# Patient Record
Sex: FEMALE | Race: WHITE | Employment: OTHER | URBAN - METROPOLITAN AREA
[De-identification: names, ages, dates, MRNs, and addresses within clinical notes are randomized per-mention and may not be internally consistent; named-entity substitution may affect disease eponyms.]

---

## 2017-01-05 ENCOUNTER — HOSPITAL ENCOUNTER (OUTPATIENT)
Dept: MAMMOGRAPHY | Age: 77
Discharge: HOME | End: 2017-01-05
Payer: MEDICARE

## 2017-01-05 DIAGNOSIS — Z12.31 VISIT FOR SCREENING MAMMOGRAM: ICD-10-CM

## 2017-01-05 PROCEDURE — G0202 SCR MAMMO BI INCL CAD: HCPCS

## 2017-01-23 ENCOUNTER — TELEPHONE (OUTPATIENT)
Dept: INTERNAL MEDICINE CLINIC | Age: 77
End: 2017-01-23

## 2017-01-26 ENCOUNTER — HOSPITAL ENCOUNTER (OUTPATIENT)
Dept: LAB | Age: 77
Discharge: HOME | End: 2017-01-26
Payer: MEDICARE

## 2017-01-26 PROCEDURE — 36415 COLL VENOUS BLD VENIPUNCTURE: CPT

## 2017-01-26 PROCEDURE — 84443 ASSAY THYROID STIM HORMONE: CPT

## 2017-01-26 PROCEDURE — 81001 URINALYSIS AUTO W/SCOPE: CPT

## 2017-01-26 PROCEDURE — 85025 COMPLETE CBC W/AUTO DIFF WBC: CPT

## 2017-01-26 PROCEDURE — 80061 LIPID PANEL: CPT

## 2017-01-26 PROCEDURE — 80053 COMPREHEN METABOLIC PANEL: CPT

## 2017-03-01 ENCOUNTER — TELEPHONE (OUTPATIENT)
Dept: INTERNAL MEDICINE CLINIC | Age: 77
End: 2017-03-01

## 2017-03-01 NOTE — TELEPHONE ENCOUNTER
Nursing is no longer coming to the house so can you leave a lab order at the  and she will take him to lab demetrice first thing in the morning

## 2017-04-24 DIAGNOSIS — E78.2 MIXED HYPERLIPIDEMIA: ICD-10-CM

## 2017-04-24 DIAGNOSIS — I10 ESSENTIAL HYPERTENSION: ICD-10-CM

## 2017-04-24 RX ORDER — BISOPROLOL FUMARATE AND HYDROCHLOROTHIAZIDE 5; 6.25 MG/1; MG/1
1 TABLET ORAL DAILY
Qty: 90 TAB | Refills: 1 | Status: SHIPPED | COMMUNITY
Start: 2017-04-24 | End: 2018-01-11 | Stop reason: SDUPTHER

## 2017-04-24 RX ORDER — SIMVASTATIN 40 MG/1
40 TABLET, FILM COATED ORAL
Qty: 90 TAB | Refills: 1 | Status: SHIPPED | COMMUNITY
Start: 2017-04-24 | End: 2018-01-11 | Stop reason: SDUPTHER

## 2017-07-13 ENCOUNTER — OFFICE VISIT (OUTPATIENT)
Dept: INTERNAL MEDICINE CLINIC | Age: 77
End: 2017-07-13

## 2017-07-13 VITALS
WEIGHT: 171 LBS | DIASTOLIC BLOOD PRESSURE: 72 MMHG | RESPIRATION RATE: 12 BRPM | HEART RATE: 73 BPM | BODY MASS INDEX: 26.84 KG/M2 | TEMPERATURE: 97.6 F | SYSTOLIC BLOOD PRESSURE: 144 MMHG | OXYGEN SATURATION: 98 % | HEIGHT: 67 IN

## 2017-07-13 DIAGNOSIS — E55.9 VITAMIN D DEFICIENCY: ICD-10-CM

## 2017-07-13 DIAGNOSIS — E78.2 MIXED HYPERLIPIDEMIA: ICD-10-CM

## 2017-07-13 DIAGNOSIS — Z23 VACCINE FOR STREPTOCOCCUS PNEUMONIAE AND INFLUENZA: ICD-10-CM

## 2017-07-13 DIAGNOSIS — K57.32 DIVERTICULITIS OF COLON: ICD-10-CM

## 2017-07-13 DIAGNOSIS — I10 ESSENTIAL HYPERTENSION: Primary | ICD-10-CM

## 2017-07-13 NOTE — MR AVS SNAPSHOT
Visit Information Date & Time Provider Department Dept. Phone Encounter #  
 7/13/2017  1:15 PM Darius Somers MD [facility] Internal Medicine [phone] [id number] Follow-up Instructions Return for Wellness Visit. Upcoming Health Maintenance Date Due Pneumococcal 65+ Low/Medium Risk (2 of 2 - PPSV23) 5/18/2016 MEDICARE YEARLY EXAM 6/30/2017 INFLUENZA AGE 9 TO ADULT 8/1/2017 GLAUCOMA SCREENING Q2Y 9/15/2018 COLONOSCOPY 10/20/2021 DTaP/Tdap/Td series (2 - Td) 5/18/2025 Allergies as of 7/13/2017  Review Complete On: 7/13/2017 By: Darius Somers MD  
  
 Severity Noted Reaction Type Reactions Pcn [Penicillins]  06/18/2010   Side Effect Swelling Arm with injection Sonata [Zaleplon]  11/13/2015    Itching Current Immunizations  Reviewed on 7/13/2017 Name Date Influenza High Dose Vaccine PF 10/1/2016, 11/2/2015, 10/14/2013 Pneumococcal Conjugate (PCV-13) 5/18/2015 Pneumococcal Polysaccharide (PPSV-23)  Incomplete Tdap 5/18/2015 Reviewed by Darius Somers MD on 7/13/2017 at  1:39 PM  
You Were Diagnosed With   
  
 Codes Comments Essential hypertension    -  Primary ICD-10-CM: I10 
ICD-9-CM: 401.9 Mixed hyperlipidemia     ICD-10-CM: E78.2 ICD-9-CM: 272.2 Vitamin D deficiency     ICD-10-CM: E55.9 ICD-9-CM: 268.9 Diverticulitis of colon     ICD-10-CM: K57.32 
ICD-9-CM: 562.11 Vaccine for streptococcus pneumoniae and influenza     ICD-10-CM: Z23 ICD-9-CM: V06.6 Vitals BP Pulse Temp Resp Height(growth percentile) Weight(growth percentile) 144/72 73 97.6 °F (36.4 °C) (Oral) 12 5' 6.5\" (1.689 m) 171 lb (77.6 kg) SpO2 BMI OB Status Smoking Status 98% 27.19 kg/m2 Postmenopausal Never Smoker Vitals History BMI and BSA Data Body Mass Index Body Surface Area  
 27.19 kg/m 2 1.91 m 2 Preferred Pharmacy Pharmacy Name Phone [facility] - Krista Gutierrez [address] [place] [phone] Your Updated Medication List  
  
   
This list is accurate as of: 7/13/17  1:52 PM.  Always use your most recent med list.  
  
  
  
  
 aspirin delayed-release 81 mg tablet Take 81 mg by mouth daily. bisoprolol-hydroCHLOROthiazide 5-6.25 mg per tablet Commonly known as:  [facility] Take 1 Tab by mouth daily. BLEPHAMIDE 10-0.2 % ophthalmic suspension Generic drug:  sulfacetamide-prednisoLONE Administer 1 Drop to both eyes every three (3) hours. carisoprodol 250 mg tablet Commonly known as:  SOMA Take 1 Tab by mouth four (4) times daily. CENTRUM SILVER PO Take  by mouth. ciprofloxacin HCl 500 mg tablet Commonly known as:  CIPRO Take 1 Tab by mouth two (2) times a day. diclofenac EC 75 mg EC tablet Commonly known as:  VOLTAREN Take 1 Tab by mouth as needed. FIBER-CAPS (PSYLLIUM HUSK) PO Take  by mouth.  
  
 lansoprazole 30 mg capsule Commonly known as:  PREVACID Take  by mouth Daily (before breakfast). [facility] Blue-Sod Phos-PhSal-Hyo 118-10-40.8-36 mg Cap capsule Commonly known as:  Darin Gene Take 1 Cap by mouth nightly as needed. PATANOL 0.1 % ophthalmic solution Generic drug:  olopatadine Administer 2 Drops to both eyes two (2) times a day. simvastatin 40 mg tablet Commonly known as:  ZOCOR Take 1 Tab by mouth nightly. traMADol 50 mg tablet Commonly known as:  ULTRAM  
TAKE 1 TABLET BY MOUTH EVERY 6 HOURS AS NEEDED PAIN  
  
 VITAMIN C 500 mg tablet Generic drug:  ascorbic acid (vitamin C) Take 500 mg by mouth daily. VITAMIN D3 1,000 unit tablet Generic drug:  cholecalciferol Take 1,000 Units by mouth daily. zolpidem 5 mg tablet Commonly known as:  AMBIEN Take 0.5-1 Tabs by mouth nightly as needed for Sleep. Max Daily Amount: 5 mg. We Performed the Following LIPID PANEL [15131 CPT(R)] METABOLIC PANEL, COMPREHENSIVE [99553 CPT(R)] PNEUMOCOCCAL POLYSACCHARIDE VACCINE, 23-VALENT, ADULT OR IMMUNOSUPPRESSED PT DOSE, [13875 CPT(R)] [place] IMMUNIZ ADMIN,1 SINGLE/COMB VAC/TOXOID [id number] CPT(R)] Follow-up Instructions Return for Wellness Visit. Patient Instructions Vertigo: Exercises Your Care Instructions Here are some examples of typical rehabilitation exercises for your condition. Start each exercise slowly. Ease off the exercise if you start to have pain. Your doctor or physical therapist will tell you when you can start these exercises and which ones will work best for you. How to do the exercises Note: Do these exercises twice a day. Try to progress to doing each head movement 15 to 20 times. Then try to do them with your eyes closed. Exercise 1 1. Stand with a chair in front of you and a wall behind you. If you begin to fall, you may use them for support. 2. Stand with your feet together and your arms at your sides. 3. Move your head up and down 10 times. Exercise 2 Move your head side to side 10 times. Exercise 3 Move your head diagonally up and down 10 times. Exercise 4 Move your head diagonally up and down 10 times on the other side. Follow-up care is a key part of your treatment and safety. Be sure to make and go to all appointments, and call your doctor if you are having problems. It's also a good idea to know your test results and keep a list of the medicines you take. Where can you learn more? Go to http://rachna-glenny.info/. Enter F349 in the search box to learn more about \"Vertigo: Exercises. \" Current as of: July 29, 2016 Content Version: 11.3 © 3899-1839 [employer] Care instructions adapted under license by [employer] (which disclaims liability or warranty for this information).  If you have questions about a medical condition or this instruction, always ask your healthcare professional. [address] any warranty or liability for your use of this information. Benign Essential Tremor: Care Instructions Your Care Instructions Benign essential tremor is a medical term for shaking that you can't control. Your hand or fingers may shake when you lift a cup or point at something. Or your voice may shake when you speak. This type of tremor is not harmful. It is not caused by a stroke or Parkinson's disease. Some things can affect how much you shake. For example, drinking or eating something with caffeine may make tremors worse for a while. Some medicines also can increase tremors. These include antidepressants and too much thyroid replacement. Talk to your doctor if you think one of your medicines makes your tremors worse. If you are self-conscious about your tremors, there are some things you can do to reduce them or make them less noticeable. This includes taking medicine. Follow-up care is a key part of your treatment and safety. Be sure to make and go to all appointments, and call your doctor if you are having problems. It's also a good idea to know your test results and keep a list of the medicines you take. How can you care for yourself at home? · Take your medicines exactly as prescribed. Call your doctor if you think you are having a problem with your medicine. Some medicines that help control tremors have to be taken every day, even if you are not having tremors. You will get more details on the specific medicines your doctor prescribes. · Get plenty of rest. 
· Eat a balanced, healthy diet. · Try to reduce stress. Regular exercise and massages may help. · Limit alcohol. Heavy drinking can make your tremors worse. · Avoid drinks or foods with caffeine if they make your tremors worse. These include tea, cola, coffee, and chocolate. · Wear a heavy bracelet or watch. This adds a little weight to your hand. The extra weight may reduce tremors. · Drink from cups or glasses that are only half full. You may also want to try drinking with a straw. When should you call for help? Watch closely for changes in your health, and be sure to contact your doctor if: 
· You notice your tremors are getting worse. · You can't do your everyday activities because of your tremors. · You are sad and embarrassed about your shaking. Where can you learn more? Go to http://rachna-glenny.info/. Enter B746 in the search box to learn more about \"Benign Essential Tremor: Care Instructions. \" Current as of: October 14, 2016 Content Version: 11.3 © 5230-3602 [employer]. Care instructions adapted under license by [employer] (which disclaims liability or warranty for this information). If you have questions about a medical condition or this instruction, always ask your healthcare professional. [place] [address] any warranty or liability for your use of this information. Introducing [facility]! Celi Hopkins introduces Everbridge patient portal. Now you can access parts of your medical record, email your doctor's office, and request medication refills online. 1. In your internet browser, go to [email or web address] Toys 2. Click on the First Time User? Click Here link in the Sign In box. You will see the New Member Sign Up page. 3. Enter your Everbridge Access Code exactly as it appears below. You will not need to use this code after youve completed the sign-up process. If you do not sign up before the expiration date, you must request a new code. · Everbridge Access Code: [email or web address] Expires: 10/11/2017 11:37 AM 
 
4. Enter the last four digits of your Social Security Number (xxxx) and Date of Birth (mm/dd/yyyy) as indicated and click Submit.  You will be taken to the next sign-up page. 5. Create a BoostSuite ID. This will be your BoostSuite login ID and cannot be changed, so think of one that is secure and easy to remember. 6. Create a BoostSuite password. You can change your password at any time. 7. Enter your Password Reset Question and Answer. This can be used at a later time if you forget your password. 8. Enter your e-mail address. You will receive e-mail notification when new information is available in [address] 9. Click Sign Up. You can now view and download portions of your medical record. 10. Click the Download Summary menu link to download a portable copy of your medical information. If you have questions, please visit the Frequently Asked Questions section of the BoostSuite website. Remember, BoostSuite is NOT to be used for urgent needs. For medical emergencies, dial 911. Now available from your iPhone and Android! Please provide this summary of care documentation to your next provider. Your primary care clinician is listed as [email or web address] If you have any questions after today's visit, please call [phone].

## 2017-07-13 NOTE — PROGRESS NOTES
Chief Complaint   Patient presents with    Cholesterol Problem    Hypertension    Dizziness    Fatigue    Tremors     Patient denies any symptoms, reactions or allergies that would exclude them from being immunized today. Risks and adverse reactions were discussed and the VIS was given to them. All questions were addressed. · Pneumovax 23  · Left Arm  · 0.5 mL  · Lot# [id number]  · Exp: 09.19.2018  · [address]  · NDC - 3475-5037-12  · Pt tolerated well. The patient was observed for 15 min post injection. There were no reactions observed.

## 2017-07-13 NOTE — PATIENT INSTRUCTIONS
Vertigo: Exercises  Your Care Instructions  Here are some examples of typical rehabilitation exercises for your condition. Start each exercise slowly. Ease off the exercise if you start to have pain. Your doctor or physical therapist will tell you when you can start these exercises and which ones will work best for you. How to do the exercises  Note: Do these exercises twice a day. Try to progress to doing each head movement 15 to 20 times. Then try to do them with your eyes closed. Exercise 1    1. Stand with a chair in front of you and a wall behind you. If you begin to fall, you may use them for support. 2. Stand with your feet together and your arms at your sides. 3. Move your head up and down 10 times. Exercise 2    Move your head side to side 10 times. Exercise 3    Move your head diagonally up and down 10 times. Exercise 4    Move your head diagonally up and down 10 times on the other side. Follow-up care is a key part of your treatment and safety. Be sure to make and go to all appointments, and call your doctor if you are having problems. It's also a good idea to know your test results and keep a list of the medicines you take. Where can you learn more? Go to http://rachna-glenny.info/. Enter F349 in the search box to learn more about \"Vertigo: Exercises. \"  Current as of: July 29, 2016  Content Version: 11.3  © 3203-2644 [employer]. Care instructions adapted under license by [employer] (which disclaims liability or warranty for this information). If you have questions about a medical condition or this instruction, always ask your healthcare professional. [place] [address] any warranty or liability for your use of this information. Benign Essential Tremor: Care Instructions  Your Care Instructions  Benign essential tremor is a medical term for shaking that you can't control.  Your hand or fingers may shake when you lift a cup or point at something. Or your voice may shake when you speak. This type of tremor is not harmful. It is not caused by a stroke or Parkinson's disease. Some things can affect how much you shake. For example, drinking or eating something with caffeine may make tremors worse for a while. Some medicines also can increase tremors. These include antidepressants and too much thyroid replacement. Talk to your doctor if you think one of your medicines makes your tremors worse. If you are self-conscious about your tremors, there are some things you can do to reduce them or make them less noticeable. This includes taking medicine. Follow-up care is a key part of your treatment and safety. Be sure to make and go to all appointments, and call your doctor if you are having problems. It's also a good idea to know your test results and keep a list of the medicines you take. How can you care for yourself at home? · Take your medicines exactly as prescribed. Call your doctor if you think you are having a problem with your medicine. Some medicines that help control tremors have to be taken every day, even if you are not having tremors. You will get more details on the specific medicines your doctor prescribes. · Get plenty of rest.  · Eat a balanced, healthy diet. · Try to reduce stress. Regular exercise and massages may help. · Limit alcohol. Heavy drinking can make your tremors worse. · Avoid drinks or foods with caffeine if they make your tremors worse. These include tea, cola, coffee, and chocolate. · Wear a heavy bracelet or watch. This adds a little weight to your hand. The extra weight may reduce tremors. · Drink from cups or glasses that are only half full. You may also want to try drinking with a straw. When should you call for help? Watch closely for changes in your health, and be sure to contact your doctor if:  · You notice your tremors are getting worse.   · You can't do your everyday activities because of your tremors. · You are sad and embarrassed about your shaking. Where can you learn more? Go to http://rachna-glenny.info/. Enter B746 in the search box to learn more about \"Benign Essential Tremor: Care Instructions. \"  Current as of: October 14, 2016  Content Version: 11.3  © 5297-7187 [employer]. Care instructions adapted under license by [employer] (which disclaims liability or warranty for this information). If you have questions about a medical condition or this instruction, always ask your healthcare professional. [place] [address] any warranty or liability for your use of this information.

## 2017-07-14 NOTE — PROGRESS NOTES
HPI:  Rosalio Henry is a 76y.o. year old female who is here for a routine visit:    Has been doing about the same. Some ongoing arthritis pain that is stable. Some feeling of dizziness on turning her head while sitting. Some nausea but no vomiting. No fever or chills. No change in bowels or bladder. No fever or chills. Past Medical History:   Diagnosis Date    Arthritis     DDD BACK PAIN    Chest pain, atypical     Chronic pain     HIP    Diverticulitis of colon     Fibrocystic disease of breast     Hypercholesterolemia     Hypertension     IBS (irritable bowel syndrome)     Ovarian cyst        Past Surgical History:   Procedure Laterality Date    ENDOSCOPY, COLON, DIAGNOSTIC  10/22/2010    5 yr fu, Brand    HX APPENDECTOMY      HX COLONOSCOPY  10/20/2016    Dr. Cee Valentin - 5 yr f/u    HX DILATION AND CURETTAGE      HX TONSILLECTOMY         Prior to Admission medications    Medication Sig Start Date End Date Taking? Authorizing Provider   simvastatin (ZOCOR) 40 mg tablet Take 1 Tab by mouth nightly. 4/24/17  Yes Jennifer Barakat MD   bisoprolol-hydroCHLOROthiazide [facility]) 5-6.25 mg per tablet Take 1 Tab by mouth daily. 4/24/17  Yes Dionicio Peña III, MD   olopatadine (PATANOL) 0.1 % ophthalmic solution Administer 2 Drops to both eyes two (2) times a day. Yes Historical Provider   sulfacetamide-prednisoLONE (BLEPHAMIDE) 10-0.2 % ophthalmic suspension Administer 1 Drop to both eyes every three (3) hours. Yes Historical Provider   FIBER-CAPS, PSYLLIUM HUSK, PO Take  by mouth. Yes Historical Provider   diclofenac EC (VOLTAREN) 75 mg EC tablet Take 1 Tab by mouth as needed. 12/29/16  Yes Dionicio Peña III, MD   carisoprodol (SOMA) 250 mg tablet Take 1 Tab by mouth four (4) times daily.  12/29/16  Yes Dionicio Peña III, MD   traMADol (ULTRAM) 50 mg tablet TAKE 1 TABLET BY MOUTH EVERY 6 HOURS AS NEEDED PAIN 12/29/16  Yes Dionicio Peña III, MD   zolpidem (AMBIEN) 5 mg tablet Take 0.5-1 Tabs by mouth nightly as needed for Sleep. Max Daily Amount: 5 mg. 12/29/16  Yes Alejandro Tan MD   [facility] Blue-Sod Phos-PhSal-Hyo (URIBEL) 118-10-40.8-36 mg cap capsule Take 1 Cap by mouth nightly as needed. 6/29/16  Yes Iliana Cortes III, MD   lansoprazole (PREVACID) 30 mg capsule Take  by mouth Daily (before breakfast). Yes Historical Provider   ascorbic acid (VITAMIN C) 500 mg tablet Take 500 mg by mouth daily. Yes Historical Provider   cholecalciferol, vitamin d3, (VITAMIN D) 1,000 unit tablet Take 1,000 Units by mouth daily. Yes Historical Provider   MULTIVITAMINS W-MINERALS/LUT (CENTRUM SILVER PO) Take  by mouth. Yes Historical Provider   aspirin delayed-release 81 mg tablet Take 81 mg by mouth daily. Yes Historical Provider   ciprofloxacin HCl (CIPRO) 500 mg tablet Take 1 Tab by mouth two (2) times a day. 12/29/16   Alejandro Tan MD       Social History     Social History    Marital status:      Spouse name: N/A    Number of children: N/A    Years of education: N/A     Occupational History    Not on file.      Social History Main Topics    Smoking status: Never Smoker    Smokeless tobacco: Never Used    Alcohol use 1.0 - 2.0 oz/week     2 - 4 Standard drinks or equivalent per week    Drug use: No    Sexual activity: Not on file     Other Topics Concern    Not on file     Social History Narrative          ROS  Per HPI    Visit Vitals    /72    Pulse 73    Temp 97.6 °F (36.4 °C) (Oral)    Resp 12    Ht 5' 6.5\" (1.689 m)    Wt 171 lb (77.6 kg)    SpO2 98%    BMI 27.19 kg/m2         Physical Exam   Physical Examination: General appearance - alert, well appearing, and in no distress  Ears - bilateral TM's and external ear canals normal  Mouth - mucous membranes moist, pharynx normal without lesions  Neck - supple, no significant adenopathy  Lymphatics - no palpable lymphadenopathy, no hepatosplenomegaly  Chest - clear to auscultation, no wheezes, rales or rhonchi, symmetric air entry  Heart - normal rate, regular rhythm, normal S1, S2, no murmurs, rubs, clicks or gallops  Abdomen - soft, nontender, nondistended, no masses or organomegaly  Musculoskeletal - no joint tenderness, deformity or swelling  Extremities - peripheral pulses normal, no pedal edema, no clubbing or cyanosis      Assessment/Plan:  Alex Martin was seen today for cholesterol problem, hypertension, dizziness, fatigue and tremors. Diagnoses and all orders for this visit:    Essential hypertension - BP well controlled. Mixed hyperlipidemia - check labs for LDL goal of 100. Tolerating meds well. -     LIPID PANEL  -     METABOLIC PANEL, COMPREHENSIVE    Vitamin D deficiency - repleted. Diverticulitis of colon - stable and no recent flairs. Will use antibiotics if needed. Vaccine for streptococcus pneumoniae and influenza  -     PNEUMOCOCCAL POLYSACCHARIDE VACCINE, 23-VALENT, ADULT OR IMMUNOSUPPRESSED PT DOSE,  -     CT IMMUNIZ ADMIN,1 SINGLE/COMB VAC/TOXOID       Follow-up Disposition:  Return for Wellness Visit. Advised her to call back or return to office if symptoms worsen/change/persist.  Discussed expected course/resolution/complications of diagnosis in detail with patient. Medication risks/benefits/costs/interactions/alternatives discussed with patient. She was given an after visit summary which includes diagnoses, current medications, & vitals. She expressed understanding with the diagnosis and plan.

## 2017-07-31 ENCOUNTER — HOSPITAL ENCOUNTER (OUTPATIENT)
Dept: LAB | Age: 77
Discharge: HOME | End: 2017-07-31
Payer: MEDICARE

## 2017-07-31 PROCEDURE — 80061 LIPID PANEL: CPT

## 2017-07-31 PROCEDURE — 80053 COMPREHEN METABOLIC PANEL: CPT

## 2017-08-01 LAB
ALBUMIN SERPL-MCNC: 4.5 G/DL (ref 3.5–4.8)
ALBUMIN/GLOB SERPL: 2.1 {RATIO} (ref 1.2–2.2)
ALP SERPL-CCNC: 60 IU/L (ref 39–117)
ALT SERPL-CCNC: 17 IU/L (ref 0–32)
AST SERPL-CCNC: 14 IU/L (ref 0–40)
BILIRUB SERPL-MCNC: 0.5 MG/DL (ref 0–1.2)
BUN SERPL-MCNC: 14 MG/DL (ref 8–27)
BUN/CREAT SERPL: 21 (ref 12–28)
CALCIUM SERPL-MCNC: 9.9 MG/DL (ref 8.7–10.3)
CHLORIDE SERPL-SCNC: 101 MMOL/L (ref 96–106)
CHOLEST SERPL-MCNC: 154 MG/DL (ref 100–199)
CO2 SERPL-SCNC: 24 MMOL/L (ref 18–29)
CREAT SERPL-MCNC: 0.68 MG/DL (ref 0.57–1)
GLOBULIN SER CALC-MCNC: 2.1 G/DL (ref 1.5–4.5)
GLUCOSE SERPL-MCNC: 94 MG/DL (ref 65–99)
HDLC SERPL-MCNC: 56 MG/DL
LDLC SERPL CALC-MCNC: 78 MG/DL (ref 0–99)
POTASSIUM SERPL-SCNC: 4.4 MMOL/L (ref 3.5–5.2)
PROT SERPL-MCNC: 6.6 G/DL (ref 6–8.5)
SODIUM SERPL-SCNC: 143 MMOL/L (ref 134–144)
TRIGL SERPL-MCNC: 102 MG/DL (ref 0–149)
VLDLC SERPL CALC-MCNC: 20 MG/DL (ref 5–40)

## 2017-09-15 DIAGNOSIS — M54.9 BACK PAIN, UNSPECIFIED BACK LOCATION, UNSPECIFIED BACK PAIN LATERALITY, UNSPECIFIED CHRONICITY: ICD-10-CM

## 2017-09-15 RX ORDER — TRAMADOL HYDROCHLORIDE 50 MG/1
TABLET ORAL
Qty: 30 TAB | Refills: 1 | OUTPATIENT
Start: 2017-09-15 | End: 2018-05-16 | Stop reason: SDUPTHER

## 2017-09-18 NOTE — TELEPHONE ENCOUNTER
Orders Placed This Encounter    traMADol (ULTRAM) 50 mg tablet     Sig: TAKE 1 TABLET BY MOUTH EVERY 6 HOURS AS NEEDED PAIN     Dispense:  30 Tab     Refill:  1     This request is for a new prescription for a controlled substance as required by Federal/State law. .     The above controlled substance refill was called into patients pharmacy [facility] - authorized by Dr. Katherine Beck.

## 2017-11-10 RX ORDER — ZOLPIDEM TARTRATE 5 MG/1
TABLET ORAL
Qty: 30 TAB | Refills: 2 | OUTPATIENT
Start: 2017-11-10 | End: 2018-05-16 | Stop reason: SDUPTHER

## 2017-11-13 NOTE — TELEPHONE ENCOUNTER
Orders Placed This Encounter    zolpidem (AMBIEN) 5 mg tablet     Sig: TAKE 1/2 TO 1 TABLET BY MOUTH AT BEDTIME AS NEEDED SLEEP     Dispense:  30 Tab     Refill:  2     This request is for a new prescription for a controlled substance as required by Federal/State law. .     The above controlled substance refill was called into patients pharmacy - [facility] - authorized by Dr. Angeles Jackson.

## 2018-01-10 ENCOUNTER — HOSPITAL ENCOUNTER (OUTPATIENT)
Dept: MAMMOGRAPHY | Age: 78
Discharge: HOME | End: 2018-01-10
Payer: MEDICARE

## 2018-01-10 DIAGNOSIS — Z12.39 SCREENING FOR MALIGNANT NEOPLASM OF BREAST: ICD-10-CM

## 2018-01-10 PROCEDURE — 77067 SCR MAMMO BI INCL CAD: CPT

## 2018-01-11 ENCOUNTER — OFFICE VISIT (OUTPATIENT)
Dept: INTERNAL MEDICINE CLINIC | Age: 78
End: 2018-01-11

## 2018-01-11 VITALS
RESPIRATION RATE: 12 BRPM | DIASTOLIC BLOOD PRESSURE: 86 MMHG | BODY MASS INDEX: 27.32 KG/M2 | WEIGHT: 170 LBS | SYSTOLIC BLOOD PRESSURE: 142 MMHG | HEIGHT: 66 IN | HEART RATE: 74 BPM | TEMPERATURE: 97.8 F | OXYGEN SATURATION: 100 %

## 2018-01-11 DIAGNOSIS — K57.32 DIVERTICULITIS OF COLON: ICD-10-CM

## 2018-01-11 DIAGNOSIS — I10 ESSENTIAL HYPERTENSION: ICD-10-CM

## 2018-01-11 DIAGNOSIS — Z00.00 MEDICARE ANNUAL WELLNESS VISIT, SUBSEQUENT: Primary | ICD-10-CM

## 2018-01-11 DIAGNOSIS — Z23 NEED FOR SHINGLES VACCINE: ICD-10-CM

## 2018-01-11 DIAGNOSIS — E78.2 MIXED HYPERLIPIDEMIA: ICD-10-CM

## 2018-01-11 RX ORDER — CARISOPRODOL 250 MG/1
250 TABLET ORAL 4 TIMES DAILY
Qty: 180 TAB | Refills: 3 | Status: SHIPPED | OUTPATIENT
Start: 2018-01-11 | End: 2018-02-05 | Stop reason: ALTCHOICE

## 2018-01-11 RX ORDER — BISOPROLOL FUMARATE AND HYDROCHLOROTHIAZIDE 5; 6.25 MG/1; MG/1
1 TABLET ORAL DAILY
Qty: 90 TAB | Refills: 1 | Status: SHIPPED | OUTPATIENT
Start: 2018-01-11 | End: 2018-07-13 | Stop reason: SDUPTHER

## 2018-01-11 RX ORDER — SIMVASTATIN 40 MG/1
40 TABLET, FILM COATED ORAL
Qty: 90 TAB | Refills: 1 | Status: SHIPPED | OUTPATIENT
Start: 2018-01-11 | End: 2018-07-13 | Stop reason: SDUPTHER

## 2018-01-11 RX ORDER — CIPROFLOXACIN 500 MG/1
500 TABLET ORAL 2 TIMES DAILY
Qty: 20 TAB | Refills: 0 | Status: SHIPPED | OUTPATIENT
Start: 2018-01-11 | End: 2018-05-16 | Stop reason: SDUPTHER

## 2018-01-11 NOTE — PROGRESS NOTES
This is a Subsequent Medicare Annual Wellness Exam (AWV) (Performed 12 months after IPPE or effective date of Medicare Part B enrollment)  Well as for follow-up of her health problems. She has been generally doing well. She is stressed with issues at home with her . She feels no recent diverticulitis. Her arthritis continues to be a problem. She did have a recent hip injection that made very little difference. In the past she has had them done under x-ray guidance with good success. She is trying to limit the diclofenac prescription to about half the time. She denies any headaches or dizziness. She occasionally is lightheaded on standing. No chest pains or shortness of breath. No cough or wheeze. She has some occasional spasms in the [employer] helps that. I have reviewed the patient's medical history in detail and updated the computerized patient record. History     Past Medical History:   Diagnosis Date    Arthritis     DDD BACK PAIN    Chest pain, atypical     Chronic pain     HIP    Diverticulitis of colon     Fibrocystic disease of breast     Hypercholesterolemia     Hypertension     IBS (irritable bowel syndrome)     Menopause 1968    Ovarian cyst       Past Surgical History:   Procedure Laterality Date    ENDOSCOPY, COLON, DIAGNOSTIC  10/22/2010    5 yr fu, [facility]    HX APPENDECTOMY      HX COLONOSCOPY  10/20/2016    Dr. Shalini Estevez - 5 yr f/u    HX DILATION AND CURETTAGE      HX TONSILLECTOMY       Current Outpatient Prescriptions   Medication Sig Dispense Refill    zolpidem (AMBIEN) 5 mg tablet TAKE 1/2 TO 1 TABLET BY MOUTH AT BEDTIME AS NEEDED SLEEP 30 Tab 2    traMADol (ULTRAM) 50 mg tablet TAKE 1 TABLET BY MOUTH EVERY 6 HOURS AS NEEDED PAIN 30 Tab 1    simvastatin (ZOCOR) 40 mg tablet Take 1 Tab by mouth nightly. 90 Tab 1    bisoprolol-hydroCHLOROthiazide (ZIAC) 5-6.25 mg per tablet Take 1 Tab by mouth daily.  90 Tab 1    olopatadine (PATANOL) 0.1 % ophthalmic solution Administer 2 Drops to both eyes two (2) times a day.  sulfacetamide-prednisoLONE (BLEPHAMIDE) 10-0.2 % ophthalmic suspension Administer 1 Drop to both eyes every three (3) hours.  FIBER-CAPS, PSYLLIUM HUSK, PO Take  by mouth.  diclofenac EC (VOLTAREN) 75 mg EC tablet Take 1 Tab by mouth as needed. 180 Tab 2    carisoprodol (SOMA) 250 mg tablet Take 1 Tab by mouth four (4) times daily. 180 Tab 3    Mth-Me Blue-Sod Phos-PhSal-Hyo (URIBEL) 118-10-40.8-36 mg cap capsule Take 1 Cap by mouth nightly as needed. 90 Cap 2    lansoprazole (PREVACID) 30 mg capsule Take  by mouth Daily (before breakfast).  ascorbic acid (VITAMIN C) 500 mg tablet Take 500 mg by mouth daily.  cholecalciferol, vitamin d3, (VITAMIN D) 1,000 unit tablet Take 1,000 Units by mouth daily.  MULTIVITAMINS W-MINERALS/LUT (CENTRUM SILVER PO) Take  by mouth.  aspirin delayed-release 81 mg tablet Take 81 mg by mouth daily.  ciprofloxacin HCl (CIPRO) 500 mg tablet Take 1 Tab by mouth two (2) times a day.  20 Tab 0     Allergies   Allergen Reactions    Pcn [Penicillins] Swelling     Arm with injection    Sonata [Zaleplon] Itching     Family History   Problem Relation Age of Onset   Asael Kirkpatrick Arthritis-rheumatoid Mother     Heart Disease Father     Hypertension Sister     Cancer Maternal Uncle     Cancer Maternal Grandfather     Other Sister      IBS     Social History   Substance Use Topics    Smoking status: Never Smoker    Smokeless tobacco: Never Used    Alcohol use 1.0 - 2.0 oz/week     2 - 4 Standard drinks or equivalent per week     Patient Active Problem List   Diagnosis Code    Diverticulitis of colon K57.32    Back pain M54.9    Mixed hyperlipidemia E78.2    Essential hypertension I10    Vitamin D deficiency E55.9    Advance directive discussed with patient Z70.80    Cephalalgia R51   ROS - Per HPI  Physical Examination: General appearance - alert, well appearing, and in no distress  Eyes - pupils equal and reactive, extraocular eye movements intact  Ears - bilateral TM's and external ear canals normal  Nose - normal and patent, no erythema, discharge or polyps  Mouth - mucous membranes moist, pharynx normal without lesions  Neck - supple, no significant adenopathy  Lymphatics - no palpable lymphadenopathy, no hepatosplenomegaly  Chest - clear to auscultation, no wheezes, rales or rhonchi, symmetric air entry  Heart - normal rate, regular rhythm, normal S1, S2, no murmurs, rubs, clicks or gallops  Abdomen - soft, nontender, nondistended, no masses or organomegaly  Back exam - full range of motion, no tenderness, palpable spasm or pain on motion  Neurological - alert, oriented, normal speech, no focal findings or movement disorder noted  Musculoskeletal - no joint tenderness, deformity or swelling  Extremities - peripheral pulses normal, no pedal edema, no clubbing or cyanosis      Depression Risk Factor Screening:     PHQ over the last two weeks 7/13/2017   Little interest or pleasure in doing things Not at all   Feeling down, depressed or hopeless Not at all   Total Score PHQ 2 0     Alcohol Risk Factor Screening: You do not drink alcohol or very rarely. Functional Ability and Level of Safety:   Hearing Loss  Hearing is good. Activities of Daily Living  The home contains: no safety equipment. Patient does total self care    Fall Risk  Fall Risk Assessment, last 12 mths 7/13/2017   Able to walk? Yes   Fall in past 12 months?  No       Abuse Screen  Patient is not abused    Cognitive Screening   Evaluation of Cognitive Function:  Has your family/caregiver stated any concerns about your memory: no      Patient Care Team   Patient Care Team:  Mateo Navas MD as PCP - Hiren Smith MD (Gastroenterology)  Cuong Rudd MD (Ophthalmology)    Assessment/Plan   Education and counseling provided:  Are appropriate based on today's review and evaluation  End-of-Life planning (with patient's consent)  Diabetes screening test    Diagnoses and all orders for this visit:    1. Medicare annual wellness visit, subsequent    2. Need for shingles vaccine    3. Mixed hyperlipidemia -tolerating medications well. LDL goal of 100. Will continue current medicines and check levels to be sure goals are met. -     METABOLIC PANEL, COMPREHENSIVE  -     CBC WITH AUTOMATED DIFF  -     LIPID PANEL  -     TSH RFX ON ABNORMAL TO FREE T4  -     UA/M W/RFLX CULTURE, ROUTINE  -     simvastatin (ZOCOR) 40 mg tablet; Take 1 Tab by mouth nightly. -     carisoprodol (SOMA) 250 mg tablet; Take 1 Tab by mouth four (4) times daily. 4. Essential hypertension -blood pressure reasonably controlled. Continue current medicines for now. -     METABOLIC PANEL, COMPREHENSIVE  -     CBC WITH AUTOMATED DIFF  -     LIPID PANEL  -     TSH RFX ON ABNORMAL TO FREE T4  -     UA/M W/RFLX CULTURE, ROUTINE  -     bisoprolol-hydroCHLOROthiazide (ZIAC) 5-6.25 mg per tablet; Take 1 Tab by mouth daily. -     carisoprodol (SOMA) 250 mg tablet; Take 1 Tab by mouth four (4) times daily. 5. Diverticulitis of colon -will refill her antibiotics which she will use judiciously only for flares. -     ciprofloxacin HCl (CIPRO) 500 mg tablet; Take 1 Tab by mouth two (2) times a day. -     carisoprodol (SOMA) 250 mg tablet; Take 1 Tab by mouth four (4) times daily. Other orders  -     varicella-zoster recombinant, PF, (SHINGRIX, PF,) 50 mcg/0.5 mL susr injection; 0.5 mL by IntraMUSCular route once for 1 dose. 6.  Osteoarthritis and muscle painwe will continue to use diclofenac only on an as-needed basis. Also use Soma as needed. Consider a repeat hip injection if the pain persist.     Follow-up Disposition: 6 months   Advised her to call back or return to office if symptoms worsen/change/persist.  Discussed expected course/resolution/complications of diagnosis in detail with patient.     Medication risks/benefits/costs/interactions/alternatives discussed with patient. She was given an after visit summary which includes diagnoses, current medications, & vitals. She expressed understanding with the diagnosis and plan.          Health Maintenance Due   Topic Date Due    MEDICARE YEARLY EXAM  06/30/2017

## 2018-01-11 NOTE — PATIENT INSTRUCTIONS

## 2018-01-11 NOTE — MR AVS SNAPSHOT
Visit Information Date & Time Provider Department Dept. Phone Encounter #  
 1/11/2018  1:30 PM Bessie Aden MD [facility] Internal Medicine [phone] [id number] Upcoming Health Maintenance Date Due  
 MEDICARE YEARLY EXAM 6/30/2017 GLAUCOMA SCREENING Q2Y 9/15/2018 COLONOSCOPY 10/20/2021 DTaP/Tdap/Td series (2 - Td) 5/18/2025 Allergies as of 1/11/2018  Review Complete On: 1/11/2018 By: Bessie Aden MD  
  
 Severity Noted Reaction Type Reactions Pcn [Penicillins]  06/18/2010   Side Effect Swelling Arm with injection Sonata [Zaleplon]  11/13/2015    Itching Current Immunizations  Reviewed on 1/11/2018 Name Date Influenza High Dose Vaccine PF 10/1/2017, 10/1/2016, 11/2/2015, 10/14/2013 Pneumococcal Conjugate (PCV-13) 5/18/2015 Pneumococcal Polysaccharide (PPSV-23) 7/13/2017 Tdap 5/18/2015 Reviewed by Bessie Aden MD on 1/11/2018 at  1:46 PM  
 Reviewed by Bessie Aedn MD on 1/11/2018 at  1:52 PM  
You Were Diagnosed With   
  
 Codes Comments Medicare annual wellness visit, subsequent    -  Primary ICD-10-CM: Z00.00 ICD-9-CM: V70.0 Need for shingles vaccine     ICD-10-CM: U33 ICD-9-CM: V04.89 Mixed hyperlipidemia     ICD-10-CM: E78.2 ICD-9-CM: 272.2 Essential hypertension     ICD-10-CM: I10 
ICD-9-CM: 401.9 Diverticulitis of colon     ICD-10-CM: K57.32 
ICD-9-CM: 562.11 Vitals BP Pulse Temp Resp Height(growth percentile) Weight(growth percentile) 142/86 74 97.8 °F (36.6 °C) (Oral) 12 5' 6\" (1.676 m) 170 lb (77.1 kg) SpO2 BMI OB Status Smoking Status 100% 27.44 kg/m2 Postmenopausal Never Smoker Vitals History BMI and BSA Data Body Mass Index Body Surface Area  
 27.44 kg/m 2 1.89 m 2 Preferred Pharmacy Pharmacy Name Phone [facility] - [place] - [address] [phone] Your Updated Medication List  
  
   
 This list is accurate as of: 1/11/18  2:10 PM.  Always use your most recent med list.  
  
  
  
  
 aspirin delayed-release 81 mg tablet Take 81 mg by mouth daily. bisoprolol-hydroCHLOROthiazide 5-6.25 mg per tablet Commonly known as:  [facility] Take 1 Tab by mouth daily. BLEPHAMIDE 10-0.2 % ophthalmic suspension Generic drug:  sulfacetamide-prednisoLONE Administer 1 Drop to both eyes every three (3) hours. carisoprodol 250 mg tablet Commonly known as:  SOMA Take 1 Tab by mouth four (4) times daily. CENTRUM SILVER PO Take  by mouth. ciprofloxacin HCl 500 mg tablet Commonly known as:  CIPRO Take 1 Tab by mouth two (2) times a day. diclofenac EC 75 mg EC tablet Commonly known as:  VOLTAREN Take 1 Tab by mouth as needed. FIBER-CAPS (PSYLLIUM HUSK) PO Take  by mouth.  
  
 lansoprazole 30 mg capsule Commonly known as:  PREVACID Take  by mouth Daily (before breakfast). [facility]-Me Blue-Sod Phos-PhSal-Hyo 118-10-40.8-36 mg Cap capsule Commonly known as:  Anthony Horn Take 1 Cap by mouth nightly as needed. PATANOL 0.1 % ophthalmic solution Generic drug:  olopatadine Administer 2 Drops to both eyes two (2) times a day. simvastatin 40 mg tablet Commonly known as:  ZOCOR Take 1 Tab by mouth nightly. traMADol 50 mg tablet Commonly known as:  ULTRAM  
TAKE 1 TABLET BY MOUTH EVERY 6 HOURS AS NEEDED PAIN  
  
 varicella-zoster recombinant (PF) 50 mcg/0.5 mL Susr injection Commonly known as:  SHINGRIX (PF)  
0.5 mL by IntraMUSCular route once for 1 dose. VITAMIN C 500 mg tablet Generic drug:  ascorbic acid (vitamin C) Take 500 mg by mouth daily. VITAMIN D3 1,000 unit tablet Generic drug:  cholecalciferol Take 1,000 Units by mouth daily. zolpidem 5 mg tablet Commonly known as:  AMBIEN  
TAKE 1/2 TO 1 TABLET BY MOUTH AT BEDTIME AS NEEDED SLEEP Prescriptions Printed Refills varicella-zoster recombinant, PF, (SHINGRIX, PF,) 50 mcg/0.5 mL susr injection 1 Si.5 mL by IntraMUSCular route once for 1 dose. Class: Print Route: IntraMUSCular  
 ciprofloxacin HCl (CIPRO) 500 mg tablet 0 Sig: Take 1 Tab by mouth two (2) times a day. Class: Print Route: Oral  
 carisoprodol (SOMA) 250 mg tablet 3 Sig: Take 1 Tab by mouth four (4) times daily. Class: Print Route: Oral  
  
Prescriptions Sent to Pharmacy Refills  
 simvastatin (ZOCOR) 40 mg tablet 1 Sig: Take 1 Tab by mouth nightly. Class: Normal  
 Pharmacy: [facility] (Specialty) - [place] [place] [facility] Ph #: [phone] Route: Oral  
 bisoprolol-hydroCHLOROthiazide (ZIAC) 5-6.25 mg per tablet 1 Sig: Take 1 Tab by mouth daily. Class: Normal  
 Pharmacy: [facility] (Specialty) - [place] [address] [facility] Ph #: [phone] Route: Oral  
  
We Performed the Following CBC WITH AUTOMATED DIFF [06022 CPT(R)] LIPID PANEL [30393 CPT(R)] METABOLIC PANEL, COMPREHENSIVE [30384 CPT(R)] TSH RFX ON ABNORMAL TO FREE T4 [[id number] Custom] UA/M W/RFLX CULTURE, ROUTINE [[id number] Custom] Patient Instructions Medicare Wellness Visit, Female The best way to live healthy is to have a healthy lifestyle by eating a well-balanced diet, exercising regularly, limiting alcohol and stopping smoking. Regular physical exams and screening tests are another way to keep healthy. Preventive exams provided by your health care provider can find health problems before they become diseases or illnesses. Preventive services including immunizations, screening tests, monitoring and exams can help you take care of your own health. All people over age 72 should have a pneumovax  and and a prevnar shot to prevent pneumonia. These are once in a lifetime unless you and your provider decide differently. All people over 65 should have a yearly flu shot and a tetanus vaccine every 10 years. A bone mass density to screen for osteoporosis or thinning of the bones should be done every 2 years after 65. Screening for diabetes mellitus with a blood sugar test should be done every year. Glaucoma is a disease of the eye due to increased ocular pressure that can lead to blindness and it should be done every year by an eye professional. 
 
Cardiovascular screening tests that check for elevated lipids (fatty part of blood) which can lead to heart disease and strokes should be done every 5 years. Colorectal screening that evaluates for blood or polyps in your colon should be done yearly as a stool test or every five years as a flexible sigmoidoscope or every 10 years as a colonoscopy up to age 76. Breast cancer screening with a mammogram is recommended biennially  for women age 54-69. Screening for cervical cancer with a pap smear and pelvic exam is recommended for women after age 72 years every 2 years up to age 79 or when the provider and patient decide to stop. If there is a history of cervical abnormalities or other increased risk for cancer then the test is recommended yearly. Hepatitis C screening is also recommended for anyone born between [birth year] through [address]. A shingles vaccine is also recommended once in a lifetime after age 61. Your Medicare Wellness Exam is recommended annually. Here is a list of your current Health Maintenance items with a due date: 
Health Maintenance Due Topic Date Due  
 Annual Well Visit  06/30/2017 Introducing [facility]! Feliz Zaidi introduces [employer] patient portal. Now you can access parts of your medical record, email your doctor's office, and request medication refills online. 1. In your internet browser, go to [email or web address]. [employer] 2. Click on the First Time User? Click Here link in the Sign In box.  You will see the New Member Sign Up page. 3. Enter your [employer] Access Code exactly as it appears below. You will not need to use this code after youve completed the sign-up process. If you do not sign up before the expiration date, you must request a new code. · [employer] Access Code: [email or web address] Expires: 4/11/2018  1:22 PM 
 
4. Enter the last four digits of your Social Security Number (xxxx) and Date of Birth (mm/dd/yyyy) as indicated and click Submit. You will be taken to the next sign-up page. 5. Create a [employer] ID. This will be your [employer] login ID and cannot be changed, so think of one that is secure and easy to remember. 6. Create a [employer] password. You can change your password at any time. 7. Enter your Password Reset Question and Answer. This can be used at a later time if you forget your password. 8. Enter your e-mail address. You will receive e-mail notification when new information is available in [address] 9. Click Sign Up. You can now view and download portions of your medical record. 10. Click the Download Summary menu link to download a portable copy of your medical information. If you have questions, please visit the Frequently Asked Questions section of the [employer] website. Remember, [employer] is NOT to be used for urgent needs. For medical emergencies, dial 911. Now available from your iPhone and Android! Please provide this summary of care documentation to your next provider. Your primary care clinician is listed as [place] [phone] If you have any questions after today's visit, please call [phone].

## 2018-02-01 ENCOUNTER — HOSPITAL ENCOUNTER (OUTPATIENT)
Dept: LAB | Age: 78
Discharge: HOME | End: 2018-02-01
Payer: MEDICARE

## 2018-02-01 PROCEDURE — 84443 ASSAY THYROID STIM HORMONE: CPT

## 2018-02-01 PROCEDURE — 80061 LIPID PANEL: CPT

## 2018-02-01 PROCEDURE — 80053 COMPREHEN METABOLIC PANEL: CPT

## 2018-02-01 PROCEDURE — 81001 URINALYSIS AUTO W/SCOPE: CPT

## 2018-02-01 PROCEDURE — 85025 COMPLETE CBC W/AUTO DIFF WBC: CPT

## 2018-02-01 PROCEDURE — 36415 COLL VENOUS BLD VENIPUNCTURE: CPT

## 2018-02-02 ENCOUNTER — TELEPHONE (OUTPATIENT)
Dept: INTERNAL MEDICINE CLINIC | Age: 78
End: 2018-02-02

## 2018-02-02 LAB
ALBUMIN SERPL-MCNC: 4.5 G/DL (ref 3.5–4.8)
ALBUMIN/GLOB SERPL: 2.3 {RATIO} (ref 1.2–2.2)
ALP SERPL-CCNC: 60 IU/L (ref 39–117)
ALT SERPL-CCNC: 15 IU/L (ref 0–32)
AST SERPL-CCNC: 15 IU/L (ref 0–40)
BASOPHILS # BLD AUTO: 0 X10E3/UL (ref 0–0.2)
BASOPHILS NFR BLD AUTO: 0 %
BILIRUB SERPL-MCNC: 0.5 MG/DL (ref 0–1.2)
BUN SERPL-MCNC: 12 MG/DL (ref 8–27)
BUN/CREAT SERPL: 19 (ref 12–28)
CALCIUM SERPL-MCNC: 9.8 MG/DL (ref 8.7–10.3)
CHLORIDE SERPL-SCNC: 101 MMOL/L (ref 96–106)
CHOLEST SERPL-MCNC: 163 MG/DL (ref 100–199)
CO2 SERPL-SCNC: 24 MMOL/L (ref 18–29)
CREAT SERPL-MCNC: 0.64 MG/DL (ref 0.57–1)
EOSINOPHIL # BLD AUTO: 0.1 X10E3/UL (ref 0–0.4)
EOSINOPHIL NFR BLD AUTO: 1 %
ERYTHROCYTE [DISTWIDTH] IN BLOOD BY AUTOMATED COUNT: 13.3 % (ref 12.3–15.4)
GFR SERPLBLD CREATININE-BSD FMLA CKD-EPI: 85 ML/MIN/1.73
GFR SERPLBLD CREATININE-BSD FMLA CKD-EPI: 98 ML/MIN/1.73
GLOBULIN SER CALC-MCNC: 2 G/DL (ref 1.5–4.5)
GLUCOSE SERPL-MCNC: 93 MG/DL (ref 65–99)
GLUCOSE UR QL: NORMAL
HCT VFR BLD AUTO: 39.9 % (ref 34–46.6)
HDLC SERPL-MCNC: 53 MG/DL
HGB BLD-MCNC: 13 G/DL (ref 11.1–15.9)
IMM GRANULOCYTES # BLD: 0 X10E3/UL (ref 0–0.1)
IMM GRANULOCYTES NFR BLD: 0 %
KETONES UR QL STRIP: NORMAL
LDLC SERPL CALC-MCNC: 92 MG/DL (ref 0–99)
LYMPHOCYTES # BLD AUTO: 1.6 X10E3/UL (ref 0.7–3.1)
LYMPHOCYTES NFR BLD AUTO: 28 %
MCH RBC QN AUTO: 29.1 PG (ref 26.6–33)
MCHC RBC AUTO-ENTMCNC: 32.6 G/DL (ref 31.5–35.7)
MCV RBC AUTO: 90 FL (ref 79–97)
MONOCYTES # BLD AUTO: 0.4 X10E3/UL (ref 0.1–0.9)
MONOCYTES NFR BLD AUTO: 7 %
NEUTROPHILS # BLD AUTO: 3.6 X10E3/UL (ref 1.4–7)
NEUTROPHILS NFR BLD AUTO: 64 %
PH UR STRIP: NORMAL [PH]
PLATELET # BLD AUTO: 233 X10E3/UL (ref 150–379)
POTASSIUM SERPL-SCNC: 4.1 MMOL/L (ref 3.5–5.2)
PROT SERPL-MCNC: 6.5 G/DL (ref 6–8.5)
PROT UR QL STRIP: NORMAL
RBC # BLD AUTO: 4.46 X10E6/UL (ref 3.77–5.28)
SODIUM SERPL-SCNC: 142 MMOL/L (ref 134–144)
SP GR UR: NORMAL
TRIGL SERPL-MCNC: 90 MG/DL (ref 0–149)
TSH SERPL DL<=0.005 MIU/L-ACNC: 1.89 UIU/ML (ref 0.45–4.5)
VLDLC SERPL CALC-MCNC: 18 MG/DL (ref 5–40)
WBC # BLD AUTO: 5.7 X10E3/UL (ref 3.4–10.8)

## 2018-02-05 ENCOUNTER — TELEPHONE (OUTPATIENT)
Dept: INTERNAL MEDICINE CLINIC | Age: 78
End: 2018-02-05

## 2018-02-05 RX ORDER — TIZANIDINE 2 MG/1
2 TABLET ORAL
Qty: 30 TAB | Refills: 0 | Status: SHIPPED | OUTPATIENT
Start: 2018-02-05 | End: 2018-07-13 | Stop reason: ALTCHOICE

## 2018-02-05 NOTE — TELEPHONE ENCOUNTER
Spoke with pt and advised that her insurance has denied her Soma. They will cover Zanaflex. Per [facility], rx sent to pharm for her to try. If she does not like the new med, she will pay OOP for Soma. Orders Placed This Encounter    tiZANidine (ZANAFLEX) 2 mg tablet     Sig: Take 1 Tab by mouth three (3) times daily as needed. Dispense:  30 Tab     Refill:  0     The above medication refills were approved via verbal order by Dr. Onofre Alberto III.

## 2018-02-05 NOTE — TELEPHONE ENCOUNTER
----- Message from Francisca Herrera MD sent at 2/5/2018 12:26 PM EST -----  Regarding: FW: Carisoprodol  Tizanidine 2 mg TID PRN. #30 to try.   ----- Message -----     From: Ernestina Brenner     Sent: 2/5/2018  11:19 AM       To: Francisca Herrera MD  Subject: Carisoprodol                                     Patient's insurance will not approve Carisoprodol. However,formulary is Skelaxin or Tizanidine for chronic pain.

## 2018-05-16 ENCOUNTER — HOSPITAL ENCOUNTER (OUTPATIENT)
Dept: LAB | Age: 78
Discharge: HOME | End: 2018-05-16
Payer: MEDICARE

## 2018-05-16 ENCOUNTER — OFFICE VISIT (OUTPATIENT)
Dept: INTERNAL MEDICINE CLINIC | Age: 78
End: 2018-05-16

## 2018-05-16 VITALS
OXYGEN SATURATION: 98 % | BODY MASS INDEX: 26.52 KG/M2 | DIASTOLIC BLOOD PRESSURE: 78 MMHG | TEMPERATURE: 98 F | HEART RATE: 76 BPM | WEIGHT: 165 LBS | SYSTOLIC BLOOD PRESSURE: 158 MMHG | HEIGHT: 66 IN | RESPIRATION RATE: 12 BRPM

## 2018-05-16 DIAGNOSIS — K57.32 DIVERTICULITIS OF COLON: ICD-10-CM

## 2018-05-16 DIAGNOSIS — F51.01 PRIMARY INSOMNIA: ICD-10-CM

## 2018-05-16 DIAGNOSIS — R53.83 FATIGUE, UNSPECIFIED TYPE: ICD-10-CM

## 2018-05-16 DIAGNOSIS — N93.9 VAGINAL BLEEDING: Primary | ICD-10-CM

## 2018-05-16 DIAGNOSIS — M54.9 BACK PAIN, UNSPECIFIED BACK LOCATION, UNSPECIFIED BACK PAIN LATERALITY, UNSPECIFIED CHRONICITY: ICD-10-CM

## 2018-05-16 PROCEDURE — 80053 COMPREHEN METABOLIC PANEL: CPT

## 2018-05-16 PROCEDURE — 85025 COMPLETE CBC W/AUTO DIFF WBC: CPT

## 2018-05-16 PROCEDURE — 81001 URINALYSIS AUTO W/SCOPE: CPT

## 2018-05-16 PROCEDURE — 36415 COLL VENOUS BLD VENIPUNCTURE: CPT

## 2018-05-16 RX ORDER — CIPROFLOXACIN 500 MG/1
500 TABLET ORAL 2 TIMES DAILY
Qty: 20 TAB | Refills: 0 | Status: SHIPPED | OUTPATIENT
Start: 2018-05-16 | End: 2020-01-15 | Stop reason: ALTCHOICE

## 2018-05-16 RX ORDER — ZOLPIDEM TARTRATE 5 MG/1
TABLET ORAL
Qty: 30 TAB | Refills: 2 | Status: SHIPPED | OUTPATIENT
Start: 2018-05-16 | End: 2019-02-05 | Stop reason: SDUPTHER

## 2018-05-16 RX ORDER — TRAMADOL HYDROCHLORIDE 50 MG/1
TABLET ORAL
Qty: 30 TAB | Refills: 1 | Status: SHIPPED | OUTPATIENT
Start: 2018-05-16 | End: 2019-01-14 | Stop reason: SDUPTHER

## 2018-05-16 NOTE — PROGRESS NOTES
HPI:  Salbador Aragon is a [de-identified]y.o. year old female who is here for several issues. She has had ongoing pain in her back for several months now. She used to do well when we were able to give her Soma and tramadol and she use those at nighttime intermittently with reasonable success. She has tried that Zanaflex I previously offered with the tramadol and it has made little difference. No radicular pain down the legs. She is also noted a several week history of profound fatigue. She feels like she wants to sleep all the time. She is having issues with sleeping at nighttime despite taking Ambien. She continues to care for her  on a 24-hour day basis and that is been stressful to her. She is also had a several week history of some brown old blood vaginal discharge that seems to be happening intermittently. Some lower abdominal pressure as well. No hematuria. She has recently seen the urologist and had a cystoscopy for bladder issues and this is been negative. Past Medical History:   Diagnosis Date    Arthritis     DDD BACK PAIN    Chest pain, atypical     Chronic pain     HIP    Diverticulitis of colon     Fibrocystic disease of breast     Hypercholesterolemia     Hypertension     IBS (irritable bowel syndrome)     Menopause 1968    Ovarian cyst        Past Surgical History:   Procedure Laterality Date    ENDOSCOPY, COLON, DIAGNOSTIC  10/22/2010    5 yr fu, [facility]    HX APPENDECTOMY      HX COLONOSCOPY  10/20/2016    Dr. Cathleen Agustin - 5 yr f/u    HX DILATION AND CURETTAGE      HX TONSILLECTOMY         Prior to Admission medications    Medication Sig Start Date End Date Taking? Authorizing Provider   ciprofloxacin HCl (CIPRO) 500 mg tablet Take 1 Tab by mouth two (2) times a day.  5/16/18  Yes Andrew Desir III, MD   zolpidem (AMBIEN) 5 mg tablet TAKE 1/2 TO 1 TABLET BY MOUTH AT BEDTIME AS NEEDED SLEEP 5/16/18  Yes Andrew Desir III, MD   traMADol (ULTRAM) 50 mg tablet TAKE 1 TABLET BY MOUTH EVERY 6 HOURS AS NEEDED PAIN  Indications: Pain 5/16/18  Yes Tye Corado III, MD   simvastatin (ZOCOR) 40 mg tablet Take 1 Tab by mouth nightly. 1/11/18  Yes Rosalba Salmeron MD   bisoprolol-hydroCHLOROthiazide [facility]) 5-6.25 mg per tablet Take 1 Tab by mouth daily. 1/11/18  Yes Tye Corado III, MD   olopatadine (PATANOL) 0.1 % ophthalmic solution Administer 2 Drops to both eyes two (2) times a day. Yes Historical Provider   sulfacetamide-prednisoLONE (BLEPHAMIDE) 10-0.2 % ophthalmic suspension Administer 1 Drop to both eyes every three (3) hours. Yes Historical Provider   FIBER-CAPS, PSYLLIUM HUSK, PO Take  by mouth. Yes Historical Provider   diclofenac EC (VOLTAREN) 75 mg EC tablet Take 1 Tab by mouth as needed. 12/29/16  Yes Rosalba Salmeron MD   [facility] Blue-Sod Phos-PhSal-Hyo (URIBEL) 118-10-40.8-36 mg cap capsule Take 1 Cap by mouth nightly as needed. 6/29/16  Yes Tye Corado III, MD   lansoprazole (PREVACID) 30 mg capsule Take  by mouth Daily (before breakfast). Yes Historical Provider   ascorbic acid (VITAMIN C) 500 mg tablet Take 500 mg by mouth daily. Yes Historical Provider   cholecalciferol, vitamin d3, (VITAMIN D) 1,000 unit tablet Take 1,000 Units by mouth daily. Yes Historical Provider   MULTIVITAMINS W-MINERALS/LUT (CENTRUM SILVER PO) Take  by mouth. Yes Historical Provider   aspirin delayed-release 81 mg tablet Take 81 mg by mouth daily. Yes Historical Provider   tiZANidine (ZANAFLEX) 2 mg tablet Take 1 Tab by mouth three (3) times daily as needed. 2/5/18   Rosalba Salmeron MD       Social History     Social History    Marital status:      Spouse name: N/A    Number of children: N/A    Years of education: N/A     Occupational History    Not on file.      Social History Main Topics    Smoking status: Never Smoker    Smokeless tobacco: Never Used    Alcohol use 1.0 - 2.0 oz/week     2 - 4 Standard drinks or equivalent per week    Drug use: No    Sexual activity: No     Other Topics Concern    Not on file     Social History Narrative          ROS  Per HPI    Visit Vitals    /78    Pulse 76    Temp 98 °F (36.7 °C) (Oral)    Resp 12    Ht 5' 6\" (1.676 m)    Wt 165 lb (74.8 kg)    SpO2 98%    BMI 26.63 kg/m2         Physical Exam   Physical Examination: General appearance - alert, well appearing, and in no distress  Mouth - mucous membranes moist, pharynx normal without lesions  Neck - supple, no significant adenopathy  Lymphatics - no palpable lymphadenopathy, no hepatosplenomegaly  Chest - clear to auscultation, no wheezes, rales or rhonchi, symmetric air entry  Heart - normal rate, regular rhythm, normal S1, S2, no murmurs, rubs, clicks or gallops  Abdomen - tenderness noted in the lower abd midline. no rebound tenderness noted  bowel sounds normal  Back exam - limited range of motion, pain with motion noted during exam  Extremities - peripheral pulses normal, no pedal edema, no clubbing or cyanosis      Assessment/Plan:  Diagnoses and all orders for this visit:    1. Vaginal bleeding -persistent. Unclear etiology. Could be fibroids or other etiology. Will have her see GYN as soon as possible for evaluation. She will need a pelvic ultrasound.  -     REFERRAL TO OBSTETRICS AND GYNECOLOGY    2. Diverticulitis of colon -has been stable recently. She keeps a supply of Cipro on hand to use as needed. -     ciprofloxacin HCl (CIPRO) 500 mg tablet; Take 1 Tab by mouth two (2) times a day. 3. Back pain, unspecified back location, unspecified back pain laterality, unspecified chronicity -likely degenerative change in muscles. We discussed the use of Soma and she will defer that for now. -     traMADol (ULTRAM) 50 mg tablet; TAKE 1 TABLET BY MOUTH EVERY 6 HOURS AS NEEDED PAIN  Indications: Pain    4. Fatigue, unspecified type -? Related to blood loss or other etiology. Will obtain repeat lab work today.   She just had thyroid checked in February and it was fine. If these are normal would pursue further evaluation.  -     UA/M W/RFLX CULTURE, ROUTINE  -     CBC WITH AUTOMATED DIFF  -     METABOLIC PANEL, COMPREHENSIVE    5. Primary insomnia  -     zolpidem (AMBIEN) 5 mg tablet; TAKE 1/2 TO 1 TABLET BY MOUTH AT BEDTIME AS NEEDED SLEEP       Follow-up Disposition: after the above. Advised her to call back or return to office if symptoms worsen/change/persist.  Discussed expected course/resolution/complications of diagnosis in detail with patient. Medication risks/benefits/costs/interactions/alternatives discussed with patient. She was given an after visit summary which includes diagnoses, current medications, & vitals. She expressed understanding with the diagnosis and plan.

## 2018-05-16 NOTE — MR AVS SNAPSHOT
[address] [facility] [address] [place] [address] 
[phone] Patient: Robert Olivas MRN:  [id number] Visit Information Date & Time Provider Department Dept. Phone Encounter #  
 5/16/2018 11:15 AM Umair Villalta MD [facility] [phone] [id number] Your Appointments 7/11/2018  1:00 PM  
ROUTINE CARE with Abdoulaye Brumfield MD  
[facility] [address] [place] [address]) Appt Note: fuv; f/u  
 [address] [facility] [address] [facility] [address]  
[address] [address] [address] Upcoming Health Maintenance Date Due Influenza Age 5 to Adult 8/1/2018 GLAUCOMA SCREENING Q2Y 9/15/2018 MEDICARE YEARLY EXAM 1/12/2019 COLONOSCOPY 10/20/2021 DTaP/Tdap/Td series (2 - Td) 5/18/2025 Allergies as of 5/16/2018  Review Complete On: 5/16/2018 By: Ankit Dubose LPN Severity Noted Reaction Type Reactions Pcn [Penicillins]  06/18/2010   Side Effect Swelling Arm with injection Sonata [Zaleplon]  11/13/2015    Itching Current Immunizations  Reviewed on 1/11/2018 Name Date Influenza High Dose Vaccine PF 10/1/2017, 10/1/2016, 11/2/2015, 10/14/2013 Pneumococcal Conjugate (PCV-13) 5/18/2015 Pneumococcal Polysaccharide (PPSV-23) 7/13/2017 Tdap 5/18/2015 Not reviewed this visit You Were Diagnosed With   
  
 Codes Comments Vaginal bleeding    -  Primary ICD-10-CM: N93.9 ICD-9-CM: 623.8 Diverticulitis of colon     ICD-10-CM: K57.32 
ICD-9-CM: 562.11 Back pain, unspecified back location, unspecified back pain laterality, unspecified chronicity     ICD-10-CM: M54.9 ICD-9-CM: 724.5 Fatigue, unspecified type     ICD-10-CM: R53.83 ICD-9-CM: 780.79 Primary insomnia     ICD-10-CM: F51.01 
ICD-9-CM: 307.42 Vitals BP Pulse Temp Resp Height(growth percentile) Weight(growth percentile) 158/78 76 98 °F (36.7 °C) (Oral) 12 5' 6\" (1.676 m) 165 lb (74.8 kg) SpO2 BMI OB Status Smoking Status 98% 26.63 kg/m2 Postmenopausal Never Smoker Vitals History BMI and BSA Data Body Mass Index Body Surface Area  
 26.63 kg/m 2 1.87 m 2 Preferred Pharmacy Pharmacy Name Phone [facility]/PHARMACY #[id number] Smith, [address] [phone] Your Updated Medication List  
  
   
This list is accurate as of 5/16/18 11:54 AM.  Always use your most recent med list.  
  
  
  
  
 aspirin delayed-release 81 mg tablet Take 81 mg by mouth daily. bisoprolol-hydroCHLOROthiazide 5-6.25 mg per tablet Commonly known as:  [facility] Take 1 Tab by mouth daily. BLEPHAMIDE 10-0.2 % ophthalmic suspension Generic drug:  sulfacetamide-prednisoLONE Administer 1 Drop to both eyes every three (3) hours. CENTRUM SILVER PO Take  by mouth. ciprofloxacin HCl 500 mg tablet Commonly known as:  CIPRO Take 1 Tab by mouth two (2) times a day. diclofenac EC 75 mg EC tablet Commonly known as:  VOLTAREN Take 1 Tab by mouth as needed. FIBER-CAPS (PSYLLIUM HUSK) PO Take  by mouth.  
  
 lansoprazole 30 mg capsule Commonly known as:  PREVACID Take  by mouth Daily (before breakfast). [facility] Blue-Sod Phos-PhSal-Hyo 118-10-40.8-36 mg Cap capsule Commonly known as:  Andrews Baumgarten Take 1 Cap by mouth nightly as needed. PATANOL 0.1 % ophthalmic solution Generic drug:  olopatadine Administer 2 Drops to both eyes two (2) times a day. simvastatin 40 mg tablet Commonly known as:  ZOCOR Take 1 Tab by mouth nightly. tiZANidine 2 mg tablet Commonly known as:  Derinda Dole Take 1 Tab by mouth three (3) times daily as needed. traMADol 50 mg tablet Commonly known as:  ULTRAM  
TAKE 1 TABLET BY MOUTH EVERY 6 HOURS AS NEEDED PAIN  Indications: Pain VITAMIN C 500 mg tablet Generic drug:  ascorbic acid (vitamin C) Take 500 mg by mouth daily. VITAMIN D3 1,000 unit tablet Generic drug:  cholecalciferol Take 1,000 Units by mouth daily. zolpidem 5 mg tablet Commonly known as:  AMBIEN  
TAKE 1/2 TO 1 TABLET BY MOUTH AT BEDTIME AS NEEDED SLEEP Prescriptions Printed Refills  
 ciprofloxacin HCl (CIPRO) 500 mg tablet 0 Sig: Take 1 Tab by mouth two (2) times a day. Class: Print Route: Oral  
 zolpidem (AMBIEN) 5 mg tablet 2 Sig: TAKE 1/2 TO 1 TABLET BY MOUTH AT BEDTIME AS NEEDED SLEEP Class: Print  
 traMADol (ULTRAM) 50 mg tablet 1 Sig: TAKE 1 TABLET BY MOUTH EVERY 6 HOURS AS NEEDED PAIN  Indications: Pain Class: Print We Performed the Following CBC WITH AUTOMATED DIFF [55287 CPT(R)] METABOLIC PANEL, COMPREHENSIVE [54261 CPT(R)] REFERRAL TO OBSTETRICS AND GYNECOLOGY [REF51 Custom] UA/M W/RFLX CULTURE, ROUTINE [[id number] Custom] Referral Information Referral ID Referred By Referred To  
  
 [id number] [facility], [address] 40 [facility] Visits Status Start Date End Date 1 New Request 5/16/18 5/16/19 If your referral has a status of pending review or denied, additional information will be sent to support the outcome of this decision. Introducing \[facility] [place] [facility]\"" & HEALTH SERVICES! Mariposa Vasquez introduces Endorphin patient portal. Now you can access parts of your medical record, email your doctor's office, and request medication refills online. 1. In your internet browser, go to [email or web address] 2. Click on the First Time User? Click Here link in the Sign In box. You will see the New Member Sign Up page. 3. Enter your Endorphin Access Code exactly as it appears below. You will not need to use this code after youve completed the sign-up process. If you do not sign up before the expiration date, you must request a new code. · MeinProspekt Access Code: [email or web address] Expires: 8/14/2018 11:54 AM 
 
4. Enter the last four digits of your Social Security Number (xxxx) and Date of Birth (mm/dd/yyyy) as indicated and click Submit. You will be taken to the next sign-up page. 5. Create a MeinProspekt ID. This will be your MeinProspekt login ID and cannot be changed, so think of one that is secure and easy to remember. 6. Create a MeinProspekt password. You can change your password at any time. 7. Enter your Password Reset Question and Answer. This can be used at a later time if you forget your password. 8. Enter your e-mail address. You will receive e-mail notification when new information is available in [address] 9. Click Sign Up. You can now view and download portions of your medical record. 10. Click the Download Summary menu link to download a portable copy of your medical information. If you have questions, please visit the Frequently Asked Questions section of the MeinProspekt website. Remember, MeinProspekt is NOT to be used for urgent needs. For medical emergencies, dial 911. Now available from your iPhone and Android! Please provide this summary of care documentation to your next provider. Your primary care clinician is listed as Chase [phone] If you have any questions after today's visit, please call [phone].

## 2018-05-17 ENCOUNTER — TELEPHONE (OUTPATIENT)
Dept: INTERNAL MEDICINE CLINIC | Age: 78
End: 2018-05-17

## 2018-05-17 LAB
ALBUMIN SERPL-MCNC: 4.7 G/DL (ref 3.5–4.7)
ALBUMIN/GLOB SERPL: 2.4 {RATIO} (ref 1.2–2.2)
ALP SERPL-CCNC: 57 IU/L (ref 39–117)
ALT SERPL-CCNC: 21 IU/L (ref 0–32)
APPEARANCE UR: CLEAR
AST SERPL-CCNC: 17 IU/L (ref 0–40)
BACTERIA #/AREA URNS HPF: NORMAL /[HPF]
BASOPHILS # BLD AUTO: 0 X10E3/UL (ref 0–0.2)
BASOPHILS NFR BLD AUTO: 0 %
BILIRUB SERPL-MCNC: 0.3 MG/DL (ref 0–1.2)
BILIRUB UR QL STRIP: NEGATIVE
BUN SERPL-MCNC: 14 MG/DL (ref 8–27)
BUN/CREAT SERPL: 23 (ref 12–28)
CALCIUM SERPL-MCNC: 10 MG/DL (ref 8.7–10.3)
CASTS URNS QL MICRO: NORMAL /LPF
CHLORIDE SERPL-SCNC: 101 MMOL/L (ref 96–106)
CO2 SERPL-SCNC: 25 MMOL/L (ref 18–29)
COLOR UR: YELLOW
CREAT SERPL-MCNC: 0.61 MG/DL (ref 0.57–1)
EOSINOPHIL # BLD AUTO: 0.1 X10E3/UL (ref 0–0.4)
EOSINOPHIL NFR BLD AUTO: 1 %
EPI CELLS #/AREA URNS HPF: NORMAL /HPF
ERYTHROCYTE [DISTWIDTH] IN BLOOD BY AUTOMATED COUNT: 13.1 % (ref 12.3–15.4)
GFR SERPLBLD CREATININE-BSD FMLA CKD-EPI: 86 ML/MIN/1.73
GFR SERPLBLD CREATININE-BSD FMLA CKD-EPI: 99 ML/MIN/1.73
GLOBULIN SER CALC-MCNC: 2 G/DL (ref 1.5–4.5)
GLUCOSE SERPL-MCNC: 89 MG/DL (ref 65–99)
GLUCOSE UR QL: NEGATIVE
HCT VFR BLD AUTO: 41 % (ref 34–46.6)
HGB BLD-MCNC: 13.4 G/DL (ref 11.1–15.9)
HGB UR QL STRIP: NEGATIVE
IMM GRANULOCYTES # BLD: 0 X10E3/UL (ref 0–0.1)
IMM GRANULOCYTES NFR BLD: 0 %
KETONES UR QL STRIP: NEGATIVE
LEUKOCYTE ESTERASE UR QL STRIP: NEGATIVE
LYMPHOCYTES # BLD AUTO: 1.8 X10E3/UL (ref 0.7–3.1)
LYMPHOCYTES NFR BLD AUTO: 26 %
MCH RBC QN AUTO: 29.3 PG (ref 26.6–33)
MCHC RBC AUTO-ENTMCNC: 32.7 G/DL (ref 31.5–35.7)
MCV RBC AUTO: 90 FL (ref 79–97)
MICRO URNS: NORMAL
MICRO URNS: NORMAL
MONOCYTES # BLD AUTO: 0.4 X10E3/UL (ref 0.1–0.9)
MONOCYTES NFR BLD AUTO: 6 %
MUCOUS THREADS URNS QL MICRO: PRESENT
NEUTROPHILS # BLD AUTO: 4.6 X10E3/UL (ref 1.4–7)
NEUTROPHILS NFR BLD AUTO: 67 %
NITRITE UR QL STRIP: NEGATIVE
PH UR STRIP: 6.5 [PH] (ref 5–7.5)
PLATELET # BLD AUTO: 248 X10E3/UL (ref 150–379)
POTASSIUM SERPL-SCNC: 4.3 MMOL/L (ref 3.5–5.2)
PROT SERPL-MCNC: 6.7 G/DL (ref 6–8.5)
PROT UR QL STRIP: NEGATIVE
RBC # BLD AUTO: 4.57 X10E6/UL (ref 3.77–5.28)
RBC #/AREA URNS HPF: NORMAL /HPF
SODIUM SERPL-SCNC: 137 MMOL/L (ref 134–144)
SP GR UR: 1.01 (ref 1–1.03)
URINALYSIS REFLEX, 377202: NORMAL
UROBILINOGEN UR STRIP-MCNC: 0.2 MG/DL (ref 0.2–1)
WBC # BLD AUTO: 7 X10E3/UL (ref 3.4–10.8)
WBC #/AREA URNS HPF: NORMAL /HPF

## 2018-05-17 NOTE — TELEPHONE ENCOUNTER
Spoke with pt and advised that per SRJ, all labs are normal. Thinks her fatigue is from sleep deprivation from caring for her  who is in hospice. Called to make her an appt with her gyn, and they actually have walk in hours. Pt will walk in on day that she is available to address the vaginal bleeding.

## 2018-05-17 NOTE — TELEPHONE ENCOUNTER
----- Message from Kelly Ennis MD sent at 5/17/2018  9:14 AM EDT -----  Notify all OK. See GYN ASAP, no reason for her fatigue other than sleep deprivation.

## 2018-07-13 ENCOUNTER — OFFICE VISIT (OUTPATIENT)
Dept: INTERNAL MEDICINE CLINIC | Age: 78
End: 2018-07-13

## 2018-07-13 VITALS
SYSTOLIC BLOOD PRESSURE: 166 MMHG | BODY MASS INDEX: 26.74 KG/M2 | HEIGHT: 66 IN | DIASTOLIC BLOOD PRESSURE: 84 MMHG | WEIGHT: 166.4 LBS | OXYGEN SATURATION: 98 % | TEMPERATURE: 98.7 F | HEART RATE: 63 BPM | RESPIRATION RATE: 14 BRPM

## 2018-07-13 DIAGNOSIS — K57.32 DIVERTICULITIS OF COLON: ICD-10-CM

## 2018-07-13 DIAGNOSIS — I10 ESSENTIAL HYPERTENSION: ICD-10-CM

## 2018-07-13 DIAGNOSIS — M54.9 BACK PAIN, UNSPECIFIED BACK LOCATION, UNSPECIFIED BACK PAIN LATERALITY, UNSPECIFIED CHRONICITY: ICD-10-CM

## 2018-07-13 DIAGNOSIS — R42 DIZZINESS: ICD-10-CM

## 2018-07-13 DIAGNOSIS — E78.2 MIXED HYPERLIPIDEMIA: ICD-10-CM

## 2018-07-13 DIAGNOSIS — R51.9 HEADACHE, UNSPECIFIED HEADACHE TYPE: ICD-10-CM

## 2018-07-13 RX ORDER — BISOPROLOL FUMARATE AND HYDROCHLOROTHIAZIDE 5; 6.25 MG/1; MG/1
1 TABLET ORAL DAILY
Qty: 90 TAB | Refills: 4 | Status: SHIPPED | OUTPATIENT
Start: 2018-07-13 | End: 2019-06-24 | Stop reason: SDUPTHER

## 2018-07-13 RX ORDER — CARISOPRODOL 250 MG/1
250 TABLET ORAL
COMMUNITY
End: 2019-01-14 | Stop reason: SDUPTHER

## 2018-07-13 RX ORDER — SIMVASTATIN 40 MG/1
40 TABLET, FILM COATED ORAL
Qty: 90 TAB | Refills: 4 | Status: SHIPPED | OUTPATIENT
Start: 2018-07-13 | End: 2019-02-26 | Stop reason: ALTCHOICE

## 2018-07-13 RX ORDER — DICLOFENAC SODIUM 75 MG/1
75 TABLET, DELAYED RELEASE ORAL AS NEEDED
Qty: 180 TAB | Refills: 2 | Status: SHIPPED | OUTPATIENT
Start: 2018-07-13 | End: 2019-10-09 | Stop reason: SDUPTHER

## 2018-07-13 NOTE — MR AVS SNAPSHOT
[address] [place] [address] 
[phone] Patient: Christie Turcios MRN:  [id number] Visit Information Date & Time Provider Department Dept. Phone Encounter #  
 7/13/2018  2:15 PM Kayce Chávez MD [facility] Internal Medicine [phone] [id number] Follow-up Instructions Return in about 6 months (around 1/13/2019). Upcoming Health Maintenance Date Due Influenza Age 5 to Adult 8/1/2018 GLAUCOMA SCREENING Q2Y 9/15/2018 MEDICARE YEARLY EXAM 1/12/2019 COLONOSCOPY 10/20/2021 DTaP/Tdap/Td series (2 - Td) 5/18/2025 Allergies as of 7/13/2018  Review Complete On: 7/13/2018 By: Kayce Chávez MD  
  
 Severity Noted Reaction Type Reactions Pcn [Penicillins]  06/18/2010   Side Effect Swelling Arm with injection Sonata [Zaleplon]  11/13/2015    Itching Current Immunizations  Reviewed on 1/11/2018 Name Date Influenza High Dose Vaccine PF 10/1/2017, 10/1/2016, 11/2/2015, 10/14/2013 Pneumococcal Conjugate (PCV-13) 5/18/2015 Pneumococcal Polysaccharide (PPSV-23) 7/13/2017 Tdap 5/18/2015 Not reviewed this visit You Were Diagnosed With   
  
 Codes Comments Essential hypertension     ICD-10-CM: I10 
ICD-9-CM: 401.9 Mixed hyperlipidemia     ICD-10-CM: E78.2 ICD-9-CM: 272.2 Back pain, unspecified back location, unspecified back pain laterality, unspecified chronicity     ICD-10-CM: M54.9 ICD-9-CM: 724.5 Diverticulitis of colon     ICD-10-CM: K57.32 
ICD-9-CM: 562.11 Headache, unspecified headache type     ICD-10-CM: R51 ICD-9-CM: 784.0 Dizziness     ICD-10-CM: C79 ICD-9-CM: 780.4 Vitals BP Pulse Temp Resp Height(growth percentile) Weight(growth percentile) 166/84 63 98.7 °F (37.1 °C) (Oral) 14 5' 6\" (1.676 m) 166 lb 6.4 oz (75.5 kg) SpO2 BMI OB Status Smoking Status 98% 26.86 kg/m2 Postmenopausal Never Smoker Vitals History BMI and BSA Data Body Mass Index Body Surface Area  
 26.86 kg/m 2 1.88 m 2 Preferred Pharmacy [facility] Phone [facility] Krista Bernal - [address] [place] [address] Your Updated Medication List  
  
   
This list is accurate as of 7/13/18  2:42 PM.  Always use your most recent med list.  
  
  
  
  
 aspirin delayed-release 81 mg tablet Take 81 mg by mouth daily. bisoprolol-hydroCHLOROthiazide 5-6.25 mg per tablet Commonly known as:  [facility] Take 1 Tab by mouth daily. BLEPHAMIDE 10-0.2 % ophthalmic suspension Generic drug:  sulfacetamide-prednisoLONE Administer 1 Drop to both eyes every three (3) hours as needed. carisoprodol 250 mg tablet Commonly known as:  SOMA Take 250 mg by mouth four (4) times daily as needed for Muscle Spasm(s). CENTRUM SILVER PO Take  by mouth. ciprofloxacin HCl 500 mg tablet Commonly known as:  CIPRO Take 1 Tab by mouth two (2) times a day. diclofenac EC 75 mg EC tablet Commonly known as:  VOLTAREN Take 1 Tab by mouth as needed. lansoprazole 30 mg capsule Commonly known as:  PREVACID Take  by mouth Daily (before breakfast). [facility]-Me Blue-Sod Phos-PhSal-Hyo 118-10-40.8-36 mg Cap capsule Commonly known as:  Manpreet Abilio Take 1 Cap by mouth nightly as needed. PATANOL 0.1 % ophthalmic solution Generic drug:  olopatadine Administer 2 Drops to both eyes two (2) times daily as needed. PROBIOTIC 4X 10-15 mg Tbec Generic drug:  B.infantis-B.ani-B.long-B.bifi Take  by mouth. simvastatin 40 mg tablet Commonly known as:  ZOCOR Take 1 Tab by mouth nightly. traMADol 50 mg tablet Commonly known as:  ULTRAM  
TAKE 1 TABLET BY MOUTH EVERY 6 HOURS AS NEEDED PAIN  Indications: Pain  
  
 varicella-zoster recombinant (PF) 50 mcg/0.5 mL Susr injection Commonly known as:  SHINGRIX (PF)  
0.5 mL by IntraMUSCular route once for 1 dose. VITAMIN C 500 mg tablet Generic drug:  ascorbic acid (vitamin C) Take 500 mg by mouth daily. VITAMIN D3 1,000 unit tablet Generic drug:  cholecalciferol Take 1,000 Units by mouth daily. zolpidem 5 mg tablet Commonly known as:  AMBIEN  
TAKE 1/2 TO 1 TABLET BY MOUTH AT BEDTIME AS NEEDED SLEEP Prescriptions Printed Refills  
 varicella-zoster recombinant, PF, (SHINGRIX, PF,) 50 mcg/0.5 mL susr injection 1 Si.5 mL by IntraMUSCular route once for 1 dose. Class: Print Route: IntraMUSCular Prescriptions Sent to Pharmacy Refills  
 diclofenac EC (VOLTAREN) 75 mg EC tablet 2 Sig: Take 1 Tab by mouth as needed. Class: Normal  
 Pharmacy: [id number], [address] [place] [address] Ph #: [phone] Route: Oral  
 bisoprolol-hydroCHLOROthiazide (ZIAC) 5-6.25 mg per tablet 4 Sig: Take 1 Tab by mouth daily. Class: Normal  
 Pharmacy: [facility] ([facility] - [place], [address] [facility] Ph #: [phone] Route: Oral  
  
Follow-up Instructions Return in about 6 months (around 2019). Introducing [facility] [place] & HEALTH SERVICES! Adan Asencio introduces Familybuilder patient portal. Now you can access parts of your medical record, email your doctor's office, and request medication refills online. 1. In your internet browser, go to [email or web address] 2. Click on the First Time User? Click Here link in the Sign In box. You will see the New Member Sign Up page. 3. Enter your Familybuilder Access Code exactly as it appears below. You will not need to use this code after youve completed the sign-up process. If you do not sign up before the expiration date, you must request a new code. · [employer]builder Access Code: [email or web address] Expires: 2018 11:54 AM 
 
 4. Enter the last four digits of your Social Security Number (xxxx) and Date of Birth (mm/dd/yyyy) as indicated and click Submit. You will be taken to the next sign-up page. 5. Create a Clariture ID. This will be your Clariture login ID and cannot be changed, so think of one that is secure and easy to remember. 6. Create a Clariture password. You can change your password at any time. 7. Enter your Password Reset Question and Answer. This can be used at a later time if you forget your password. 8. Enter your e-mail address. You will receive e-mail notification when new information is available in [address] 9. Click Sign Up. You can now view and download portions of your medical record. 10. Click the Download Summary menu link to download a portable copy of your medical information. If you have questions, please visit the Frequently Asked Questions section of the Clariture website. Remember, Clariture is NOT to be used for urgent needs. For medical emergencies, dial 911. Now available from your iPhone and Android! Please provide this summary of care documentation to your next provider. Your primary care clinician is listed as Chase [phone] If you have any questions after today's visit, please call [phone].

## 2018-07-14 NOTE — PROGRESS NOTES
HPI:  Adele Sol is a [de-identified]y.o. year old female who is here for a routine visit:    Here for routine follow-up visit. Has been doing reasonably well. Continues to care for her  on a regular basis. Has not been checking her blood pressures. Does have some ongoing issues with hip pain and back pain that are relieved with Voltaren and Soma. Continues to have issues with her bladder. She does find that the medication helps. No recent diverticular flares. She is doing pelvic floor therapy and it is helping. Past Medical History:   Diagnosis Date    Arthritis     DDD BACK PAIN    Chest pain, atypical     Chronic pain     HIP    Diverticulitis of colon     Fibrocystic disease of breast     Hypercholesterolemia     Hypertension     IBS (irritable bowel syndrome)     Menopause 1968    Ovarian cyst        Past Surgical History:   Procedure Laterality Date    ENDOSCOPY, COLON, DIAGNOSTIC  10/22/2010    5 yr fu, Brand    HX APPENDECTOMY      HX COLONOSCOPY  10/20/2016    Dr. Land Bi - 5 yr f/u    HX DILATION AND CURETTAGE      HX TONSILLECTOMY         Prior to Admission medications    Medication Sig Start Date End Date Taking? Authorizing Provider   BSwethainfantis-B.ani-BSwethalong-B.bifi (PROBIOTIC 4X) 10-15 mg TbEC Take  by mouth. Yes Historical Provider   carisoprodol (SOMA) 250 mg tablet Take 250 mg by mouth four (4) times daily as needed for Muscle Spasm(s). Yes Historical Provider   varicella-zoster recombinant, PF, (SHINGRIX, PF,) 50 mcg/0.5 mL susr injection 0.5 mL by IntraMUSCular route once for 1 dose. 7/13/18 7/13/18 Yes Mirna Graham III, MD   diclofenac EC (VOLTAREN) 75 mg EC tablet Take 1 Tab by mouth as needed. 7/13/18  Yes Fernando Parker MD   bisoprolol-hydroCHLOROthiazide [facility]) 5-6.25 mg per tablet Take 1 Tab by mouth daily. 7/13/18  Yes Mirna Graham III, MD   simvastatin (ZOCOR) 40 mg tablet Take 1 Tab by mouth nightly.  7/13/18  Yes Fernando Parker MD   zolpidem (AMBIEN) 5 mg tablet TAKE 1/2 TO 1 TABLET BY MOUTH AT BEDTIME AS NEEDED SLEEP 5/16/18  Yes Majo Bassett III, MD   traMADol (ULTRAM) 50 mg tablet TAKE 1 TABLET BY MOUTH EVERY 6 HOURS AS NEEDED PAIN  Indications: Pain 5/16/18  Yes Majo Bassett III, MD   olopatadine (PATANOL) 0.1 % ophthalmic solution Administer 2 Drops to both eyes two (2) times daily as needed. Yes Historical Provider   sulfacetamide-prednisoLONE (BLEPHAMIDE) 10-0.2 % ophthalmic suspension Administer 1 Drop to both eyes every three (3) hours as needed. Yes Historical Provider   [facility] Blue-Sod Phos-PhSal-Hyo (URIBEL) 118-10-40.8-36 mg cap capsule Take 1 Cap by mouth nightly as needed. 6/29/16  Yes Majo Bassett III, MD   ascorbic acid (VITAMIN C) 500 mg tablet Take 500 mg by mouth daily. Yes Historical Provider   cholecalciferol, vitamin d3, (VITAMIN D) 1,000 unit tablet Take 1,000 Units by mouth daily. Yes Historical Provider   MULTIVITAMINS W-MINERALS/LUT (CENTRUM SILVER PO) Take  by mouth. Yes Historical Provider   aspirin delayed-release 81 mg tablet Take 81 mg by mouth daily. Yes Historical Provider   ciprofloxacin HCl (CIPRO) 500 mg tablet Take 1 Tab by mouth two (2) times a day. 5/16/18   Majo Bassett III, MD   lansoprazole (PREVACID) 30 mg capsule Take  by mouth Daily (before breakfast). Historical Provider       Social History     Social History    Marital status:      Spouse name: N/A    Number of children: N/A    Years of education: N/A     Occupational History    Not on file.      Social History Main Topics    Smoking status: Never Smoker    Smokeless tobacco: Never Used    Alcohol use 1.0 - 2.0 oz/week     2 - 4 Standard drinks or equivalent per week    Drug use: No    Sexual activity: No     Other Topics Concern    Not on file     Social History Narrative          ROS  Per HPI    Visit Vitals    /84    Pulse 63    Temp 98.7 °F (37.1 °C) (Oral)    Resp 14    Ht 5' 6\" (1.676 m)    Wt 166 lb 6.4 oz (75.5 kg)    SpO2 98%    BMI 26.86 kg/m2         Physical Exam   Physical Examination: General appearance - alert, well appearing, and in no distress  Mouth - mucous membranes moist, pharynx normal without lesions  Neck - supple, no significant adenopathy  Lymphatics - no palpable lymphadenopathy, no hepatosplenomegaly  Chest - clear to auscultation, no wheezes, rales or rhonchi, symmetric air entry  Heart - normal rate, regular rhythm, normal S1, S2, no murmurs, rubs, clicks or gallops  Abdomen - soft, nontender, nondistended, no masses or organomegaly  Musculoskeletal - no joint tenderness, deformity or swelling  Extremities - peripheral pulses normal, no pedal edema, no clubbing or cyanosis      Assessment/Plan:  Diagnoses and all orders for this visit:    1. Essential hypertension -blood pressure up today. She is going to check it at home on a regular basis and send me readings in 2 weeks. -     diclofenac EC (VOLTAREN) 75 mg EC tablet; Take 1 Tab by mouth as needed. -     bisoprolol-hydroCHLOROthiazide (ZIAC) 5-6.25 mg per tablet; Take 1 Tab by mouth daily. 2. Mixed hyperlipidemia -well-controlled on recent labs. Will continue current meds that are well-controlled and follow-up labs in 6 months.  -     diclofenac EC (VOLTAREN) 75 mg EC tablet; Take 1 Tab by mouth as needed. -     simvastatin (ZOCOR) 40 mg tablet; Take 1 Tab by mouth nightly. 3. Back pain, unspecified back location, unspecified back pain laterality, unspecified chronicity -stable on current meds. Will continue Soma and Voltaren as needed. -     diclofenac EC (VOLTAREN) 75 mg EC tablet; Take 1 Tab by mouth as needed. 4. Diverticulitis of colon -stable. -     diclofenac EC (VOLTAREN) 75 mg EC tablet; Take 1 Tab by mouth as needed. 5. Headache, unspecified headache type -stable. -     diclofenac EC (VOLTAREN) 75 mg EC tablet; Take 1 Tab by mouth as needed.     6. Dizziness  -     diclofenac EC (VOLTAREN) 75 mg EC tablet; Take 1 Tab by mouth as needed. Other orders  -     varicella-zoster recombinant, PF, (SHINGRIX, PF,) 50 mcg/0.5 mL susr injection; 0.5 mL by IntraMUSCular route once for 1 dose. Follow-up Disposition:  Return in about 6 months (around 1/13/2019). Advised her to call back or return to office if symptoms worsen/change/persist.  Discussed expected course/resolution/complications of diagnosis in detail with patient. Medication risks/benefits/costs/interactions/alternatives discussed with patient. She was given an after visit summary which includes diagnoses, current medications, & vitals. She expressed understanding with the diagnosis and plan.

## 2019-01-14 ENCOUNTER — OFFICE VISIT (OUTPATIENT)
Dept: INTERNAL MEDICINE CLINIC | Age: 79
End: 2019-01-14

## 2019-01-14 VITALS
HEIGHT: 66 IN | DIASTOLIC BLOOD PRESSURE: 79 MMHG | HEART RATE: 69 BPM | RESPIRATION RATE: 16 BRPM | OXYGEN SATURATION: 97 % | SYSTOLIC BLOOD PRESSURE: 139 MMHG | WEIGHT: 170 LBS | TEMPERATURE: 98 F | BODY MASS INDEX: 27.32 KG/M2

## 2019-01-14 DIAGNOSIS — Z00.00 MEDICARE ANNUAL WELLNESS VISIT, SUBSEQUENT: Primary | ICD-10-CM

## 2019-01-14 DIAGNOSIS — E78.2 MIXED HYPERLIPIDEMIA: ICD-10-CM

## 2019-01-14 DIAGNOSIS — I10 ESSENTIAL HYPERTENSION: ICD-10-CM

## 2019-01-14 DIAGNOSIS — E55.9 VITAMIN D DEFICIENCY: ICD-10-CM

## 2019-01-14 DIAGNOSIS — M54.9 BACK PAIN, UNSPECIFIED BACK LOCATION, UNSPECIFIED BACK PAIN LATERALITY, UNSPECIFIED CHRONICITY: ICD-10-CM

## 2019-01-14 RX ORDER — HYDROGEN PEROXIDE 3 %
SOLUTION, NON-ORAL MISCELLANEOUS DAILY
COMMUNITY
End: 2020-10-01 | Stop reason: ALTCHOICE

## 2019-01-14 RX ORDER — ESTRADIOL 10 UG/1
10 INSERT VAGINAL EVERY 2 WEEKS
COMMUNITY
End: 2020-01-15 | Stop reason: ALTCHOICE

## 2019-01-14 RX ORDER — TRAMADOL HYDROCHLORIDE 50 MG/1
TABLET ORAL
Qty: 30 TAB | Refills: 0 | Status: SHIPPED | OUTPATIENT
Start: 2019-01-14 | End: 2019-06-10 | Stop reason: SDUPTHER

## 2019-01-14 RX ORDER — CARISOPRODOL 250 MG/1
250 TABLET ORAL
Qty: 60 TAB | Refills: 0 | Status: SHIPPED | OUTPATIENT
Start: 2019-01-14 | End: 2019-07-14 | Stop reason: SDUPTHER

## 2019-01-14 NOTE — PATIENT INSTRUCTIONS
Medicare Wellness Visit, Female The best way to live healthy is to have a lifestyle where you eat a well-balanced diet, exercise regularly, limit alcohol use, and quit all forms of tobacco/nicotine, if applicable. Regular preventive services are another way to keep healthy. Preventive services (vaccines, screening tests, monitoring & exams) can help personalize your care plan, which helps you manage your own care. Screening tests can find health problems at the earliest stages, when they are easiest to treat. [facility] follows the current, evidence-based guidelines published by the [facility] ([facility]) when recommending preventive services for our patients. Because we follow these guidelines, sometimes recommendations change over time as research supports it. (For example, mammograms used to be recommended annually. Even though Medicare will still pay for an annual mammogram, the newer guidelines recommend a mammogram every two years for women of average risk.) Of course, you and your doctor may decide to screen more often for some diseases, based on your risk and your health status. Preventive services for you include: - Medicare offers their members a free annual wellness visit, which is time for you and your primary care provider to discuss and plan for your preventive service needs. Take advantage of this benefit every year! 
-All adults over the age of 72 should receive the recommended pneumonia vaccines. Current USPSTF guidelines recommend a series of two vaccines for the best pneumonia protection.  
-All adults should have a flu vaccine yearly and a tetanus vaccine every 10 years. All adults age 61 and older should receive a shingles vaccine once in their lifetime.   
-A bone mass density test is recommended when a woman turns 65 to screen for osteoporosis. This test is only recommended one time, as a screening. Some providers will use this same test as a disease monitoring tool if you already have osteoporosis. -All adults age 38-68 who are overweight should have a diabetes screening test once every three years.  
-Other screening tests and preventive services for persons with diabetes include: an eye exam to screen for diabetic retinopathy, a kidney function test, a foot exam, and stricter control over your cholesterol.  
-Cardiovascular screening for adults with routine risk involves an electrocardiogram (ECG) at intervals determined by your doctor.  
-Colorectal cancer screenings should be done for adults age 54-65 with no increased risk factors for colorectal cancer. There are a number of acceptable methods of screening for this type of cancer. Each test has its own benefits and drawbacks. Discuss with your doctor what is most appropriate for you during your annual wellness visit. The different tests include: colonoscopy (considered the best screening method), a fecal occult blood test, a fecal DNA test, and sigmoidoscopy. -Breast cancer screenings are recommended every other year for women of normal risk, age 54-69. 
-Cervical cancer screenings for women over age 72 are only recommended with certain risk factors.  
-All adults born between [facility] should be screened once for Hepatitis C. Here is a list of your current Health Maintenance items (your personalized list of preventive services) with a due date: 
Health Maintenance Due Topic Date Due  Shingles Vaccine (1 of 2) 05/12/1988  Flu Vaccine  08/01/2018  Glaucoma Screening   09/15/2018 [address] [place] [address] Annual Well Visit  01/12/2019

## 2019-01-14 NOTE — PROGRESS NOTES
This is the Subsequent Medicare Annual Wellness Exam, performed 12 months or more after the Initial AWV or the last Subsequent AWV I have reviewed the patient's medical history in detail and updated the computerized patient record. As well as a follow-up of her health issues. She continues to have issues with arthritis. She is taking her muscle relaxers and pain medications as needed but rarely. Some ongoing issues with knee pain. No recent diverticulitis. Her bladder gives her some difficulty but she does not wish to go back to urology to evaluate for that. Pressures been under good control. History Past Medical History:  
Diagnosis Date  Arthritis DDD BACK PAIN  
 Chest pain, atypical   
 Chronic pain HIP  Diverticulitis of colon  Fibrocystic disease of breast   
 Hypercholesterolemia  Hypertension  IBS (irritable bowel syndrome) [facility]  Ovarian cyst   
  
Past Surgical History:  
Procedure Laterality Date  ENDOSCOPY, COLON, DIAGNOSTIC  10/22/2010  
 5 yr fu, Brand  HX APPENDECTOMY  HX COLONOSCOPY  10/20/2016 Dr. Clay Gonzalez - 5 yr f/u  
 HX DILATION AND CURETTAGE    
 HX TONSILLECTOMY Current Outpatient Medications Medication Sig Dispense Refill  estradiol (YUVAFEM) 10 mcg tab vaginal tablet Insert 10 mcg into vagina Once every 2 weeks.  esomeprazole (NEXIUM) 20 mg capsule Take  by mouth daily.  calcium-cholecalciferol, d3, (CALCIUM 600 + D) 600-125 mg-unit tab Take  by mouth.  B.infantis-B.ani-B.long-B.bifi (PROBIOTIC 4X) 10-15 mg TbEC Take  by mouth.  carisoprodol (SOMA) 250 mg tablet Take 250 mg by mouth four (4) times daily as needed for Muscle Spasm(s).  diclofenac EC (VOLTAREN) 75 mg EC tablet Take 1 Tab by mouth as needed. 180 Tab 2  
 bisoprolol-hydroCHLOROthiazide (ZIAC) 5-6.25 mg per tablet Take 1 Tab by mouth daily. 90 Tab 4  simvastatin (ZOCOR) 40 mg tablet Take 1 Tab by mouth nightly. 90 Tab 4  ciprofloxacin HCl (CIPRO) 500 mg tablet Take 1 Tab by mouth two (2) times a day. 20 Tab 0  
 zolpidem (AMBIEN) 5 mg tablet TAKE 1/2 TO 1 TABLET BY MOUTH AT BEDTIME AS NEEDED SLEEP 30 Tab 2  
 traMADol (ULTRAM) 50 mg tablet TAKE 1 TABLET BY MOUTH EVERY 6 HOURS AS NEEDED PAIN  Indications: Pain 30 Tab 1  
 olopatadine (PATANOL) 0.1 % ophthalmic solution Administer 2 Drops to both eyes two (2) times daily as needed.  sulfacetamide-prednisoLONE (BLEPHAMIDE) 10-0.2 % ophthalmic suspension Administer 1 Drop to both eyes every three (3) hours as needed.  Mth-Me Blue-Sod Phos-PhSal-Hyo (URIBEL) 118-10-40.8-36 mg cap capsule Take 1 Cap by mouth nightly as needed. 90 Cap 2  
 ascorbic acid (VITAMIN C) 500 mg tablet Take 500 mg by mouth daily.  MULTIVITAMINS W-MINERALS/LUT (CENTRUM SILVER PO) Take  by mouth.  aspirin delayed-release 81 mg tablet Take 81 mg by mouth daily.  lansoprazole (PREVACID) 30 mg capsule Take  by mouth Daily (before breakfast).  cholecalciferol, vitamin d3, (VITAMIN D) 1,000 unit tablet Take 1,000 Units by mouth daily. Allergies Allergen Reactions  Pcn [Penicillins] Swelling Arm with injection  Sonata [Zaleplon] Itching Family History Problem Relation Age of Onset  Arthritis-rheumatoid Mother  Heart Disease Father  Hypertension Sister  Cancer Maternal Uncle  Cancer Maternal Grandfather  Other Sister IBS Social History Tobacco Use  Smoking status: Never Smoker  Smokeless tobacco: Never Used Substance Use Topics  Alcohol use: Yes Alcohol/week: 1.0 - 2.0 oz Types: 2 - 4 Standard drinks or equivalent per week Patient Active Problem List  
Diagnosis Code  Diverticulitis of colon K57.32  Back pain M54.9  Mixed hyperlipidemia E78.2  
 Essential hypertension I10  Vitamin D deficiency E55.9  Advance directive discussed with patient Z70.80  
 Cephalalgia Z22 ROS - Per HPI Physical Examination: General appearance - alert, well appearing, and in no distress Eyes - pupils equal and reactive, extraocular eye movements intact Ears - bilateral TM's and external ear canals normal 
Nose - normal and patent, no erythema, discharge or polyps Mouth - mucous membranes moist, pharynx normal without lesions Neck - supple, no significant adenopathy Lymphatics - no palpable lymphadenopathy, no hepatosplenomegaly Chest - clear to auscultation, no wheezes, rales or rhonchi, symmetric air entry Heart - normal rate, regular rhythm, normal S1, S2, no murmurs, rubs, clicks or gallops Abdomen - soft, nontender, nondistended, no masses or organomegaly Back exam - full range of motion, no tenderness, palpable spasm or pain on motion Neurological - alert, oriented, normal speech, no focal findings or movement disorder noted Musculoskeletal - no joint tenderness, deformity or swelling Extremities - peripheral pulses normal, no pedal edema, no clubbing or cyanosis Depression Risk Factor Screening: PHQ over the last two weeks 7/13/2018 Little interest or pleasure in doing things Not at all Feeling down, depressed, irritable, or hopeless Not at all Total Score PHQ 2 0 Alcohol Risk Factor Screening: You do not drink alcohol or very rarely. Functional Ability and Level of Safety:  
Hearing Loss Hearing is good. Activities of Daily Living The home contains: no safety equipment. Patient does total self care Fall Risk Fall Risk Assessment, last 12 mths 7/13/2018 Able to walk? Yes Fall in past 12 months? Yes Fall with injury? No  
Number of falls in past 12 months 1 Fall Risk Score 1 Abuse Screen Patient is not abused Cognitive Screening Evaluation of Cognitive Function: 
Has your family/caregiver stated any concerns about your memory: no 
 
 
Patient Care Team  
 Patient Care Team: 
Shannan Taylor MD as PCP - Lenin Manning MD (Gastroenterology) Jacquie Ash MD (Ophthalmology) Assessment/Plan Education and counseling provided: 
Are appropriate based on today's review and evaluation End-of-Life planning (with patient's consent) Screening Mammography Diabetes screening test 
 
Diagnoses and all orders for this visit: 
 
1. Medicare annual wellness visit, subsequent 2. Back pain, unspecified back location, unspecified back pain laterality, unspecified chronicity -we will continue to use medications as needed. -     traMADol (ULTRAM) 50 mg tablet; TAKE 1 TABLET BY MOUTH EVERY 6 HOURS AS NEEDED PAIN 
3. Hypertensionblood pressure well controlled now. 4.  Hyperlipidemiarepeat lipid levels today. 5.  Incontinence. Will treat as needed. Other orders 
-     CBC WITH AUTOMATED DIFF 
-     METABOLIC PANEL, COMPREHENSIVE 
-     LIPID PANEL 
-     TSH RFX ON ABNORMAL TO FREE T4 
-     varicella-zoster recombinant, PF, (SHINGRIX, PF,) 50 mcg/0.5 mL susr injection; 0.5 mL by IntraMUSCular route once for 1 dose. 
-     REFERRAL TO OPHTHALMOLOGY 
-     carisoprodol (SOMA) 250 mg tablet; Take 1 Tab by mouth four (4) times daily as needed for Muscle Spasm(s). Max Daily Amount: 1,000 mg. Health Maintenance Due Topic Date Due  Shingrix Vaccine Age 50> (1 of 2) 05/12/1988  Influenza Age 5 to Adult  08/01/2018  GLAUCOMA SCREENING Q2Y  09/15/2018  MEDICARE YEARLY EXAM  01/12/2019

## 2019-01-16 ENCOUNTER — HOSPITAL ENCOUNTER (OUTPATIENT)
Dept: MAMMOGRAPHY | Age: 79
Discharge: HOME | End: 2019-01-16
Payer: MEDICARE

## 2019-01-16 DIAGNOSIS — Z12.31 VISIT FOR SCREENING MAMMOGRAM: ICD-10-CM

## 2019-01-16 PROCEDURE — 77067 SCR MAMMO BI INCL CAD: CPT

## 2019-02-05 DIAGNOSIS — F51.01 PRIMARY INSOMNIA: ICD-10-CM

## 2019-02-05 RX ORDER — ZOLPIDEM TARTRATE 5 MG/1
TABLET ORAL
Qty: 30 TAB | Refills: 1 | OUTPATIENT
Start: 2019-02-05 | End: 2019-12-02 | Stop reason: SDUPTHER

## 2019-02-06 NOTE — TELEPHONE ENCOUNTER
Orders Placed This Encounter    zolpidem (AMBIEN) 5 mg tablet     Sig: TAKE 1/2 A TABLET TO 1 TABLET BY MOUTH AT BEDTIME AS NEEDED FOR SLEEP     Dispense:  30 Tab     Refill:  1     This request is for a new prescription for a controlled substance as required by Federal/State law. .     The above controlled substance refill was called into patients pharmacy - [facility]/ - authorized by Dr. Lefty Wilson.

## 2019-02-20 LAB
ALBUMIN SERPL-MCNC: 4.4 G/DL (ref 3.5–4.7)
ALBUMIN/GLOB SERPL: 2.2 {RATIO} (ref 1.2–2.2)
ALP SERPL-CCNC: 66 IU/L (ref 39–117)
ALT SERPL-CCNC: 15 IU/L (ref 0–32)
AST SERPL-CCNC: 11 IU/L (ref 0–40)
BASOPHILS # BLD AUTO: 0 X10E3/UL (ref 0–0.2)
BASOPHILS NFR BLD AUTO: 0 %
BILIRUB SERPL-MCNC: 0.3 MG/DL (ref 0–1.2)
BUN SERPL-MCNC: 13 MG/DL (ref 8–27)
BUN/CREAT SERPL: 17 (ref 12–28)
CALCIUM SERPL-MCNC: 9.9 MG/DL (ref 8.7–10.3)
CHLORIDE SERPL-SCNC: 103 MMOL/L (ref 96–106)
CHOLEST SERPL-MCNC: 209 MG/DL (ref 100–199)
CO2 SERPL-SCNC: 25 MMOL/L (ref 20–29)
CREAT SERPL-MCNC: 0.77 MG/DL (ref 0.57–1)
EOSINOPHIL # BLD AUTO: 0.1 X10E3/UL (ref 0–0.4)
EOSINOPHIL NFR BLD AUTO: 2 %
ERYTHROCYTE [DISTWIDTH] IN BLOOD BY AUTOMATED COUNT: 13.5 % (ref 12.3–15.4)
GLOBULIN SER CALC-MCNC: 2 G/DL (ref 1.5–4.5)
GLUCOSE SERPL-MCNC: 88 MG/DL (ref 65–99)
HCT VFR BLD AUTO: 40.4 % (ref 34–46.6)
HDLC SERPL-MCNC: 52 MG/DL
HGB BLD-MCNC: 13.4 G/DL (ref 11.1–15.9)
IMM GRANULOCYTES # BLD AUTO: 0 X10E3/UL (ref 0–0.1)
IMM GRANULOCYTES NFR BLD AUTO: 0 %
LDLC SERPL CALC-MCNC: 139 MG/DL (ref 0–99)
LYMPHOCYTES # BLD AUTO: 1.7 X10E3/UL (ref 0.7–3.1)
LYMPHOCYTES NFR BLD AUTO: 36 %
MCH RBC QN AUTO: 29 PG (ref 26.6–33)
MCHC RBC AUTO-ENTMCNC: 33.2 G/DL (ref 31.5–35.7)
MCV RBC AUTO: 87 FL (ref 79–97)
MONOCYTES # BLD AUTO: 0.3 X10E3/UL (ref 0.1–0.9)
MONOCYTES NFR BLD AUTO: 6 %
NEUTROPHILS # BLD AUTO: 2.6 X10E3/UL (ref 1.4–7)
NEUTROPHILS NFR BLD AUTO: 56 %
PLATELET # BLD AUTO: 233 X10E3/UL (ref 150–379)
POTASSIUM SERPL-SCNC: 4.7 MMOL/L (ref 3.5–5.2)
PROT SERPL-MCNC: 6.4 G/DL (ref 6–8.5)
RBC # BLD AUTO: 4.62 X10E6/UL (ref 3.77–5.28)
SODIUM SERPL-SCNC: 143 MMOL/L (ref 134–144)
TRIGL SERPL-MCNC: 90 MG/DL (ref 0–149)
TSH SERPL DL<=0.005 MIU/L-ACNC: 2.67 UIU/ML (ref 0.45–4.5)
VLDLC SERPL CALC-MCNC: 18 MG/DL (ref 5–40)
WBC # BLD AUTO: 4.6 X10E3/UL (ref 3.4–10.8)

## 2019-02-26 ENCOUNTER — TELEPHONE (OUTPATIENT)
Dept: INTERNAL MEDICINE CLINIC | Age: 79
End: 2019-02-26

## 2019-02-26 RX ORDER — ROSUVASTATIN CALCIUM 10 MG/1
10 TABLET, COATED ORAL
Qty: 90 TAB | Refills: 1 | Status: SHIPPED | OUTPATIENT
Start: 2019-02-26 | End: 2019-04-15 | Stop reason: SINTOL

## 2019-02-26 NOTE — TELEPHONE ENCOUNTER
Spoke with pt in ref to lab results. LDL increased to 139. Per [facility], will change the zocor to Crestor 10 mg daily. Pt has been off of the zocor for a month because of myalgias and had previously discussed with [facility] that this would be the plan depending on labs. Rx sent to pharm. To repeat labs in 6 months. Verbalized understanding. Orders Placed This Encounter    rosuvastatin (CRESTOR) 10 mg tablet     Sig: Take 1 Tab by mouth nightly. Dispense:  90 Tab     Refill:  1     The above orders were approved via [facility] per Dr. Marina Garsia, III.

## 2019-02-26 NOTE — TELEPHONE ENCOUNTER
----- Message from Carlitos Keith MD sent at 2/20/2019 12:13 PM EST -----  Change the zocor to crestor 10 mg daily. Labs in 6 months.

## 2019-02-27 NOTE — TELEPHONE ENCOUNTER
Spoke with pt. She just wanted to verify the dose of Crestor that we discussed. 10 mg is correct. Verbalized understanding.

## 2019-06-10 DIAGNOSIS — M54.9 BACK PAIN, UNSPECIFIED BACK LOCATION, UNSPECIFIED BACK PAIN LATERALITY, UNSPECIFIED CHRONICITY: ICD-10-CM

## 2019-06-10 RX ORDER — TRAMADOL HYDROCHLORIDE 50 MG/1
TABLET ORAL
Qty: 30 TAB | Refills: 0 | OUTPATIENT
Start: 2019-06-10 | End: 2019-07-10

## 2019-06-10 NOTE — TELEPHONE ENCOUNTER
Orders Placed This Encounter    traMADol (ULTRAM) 50 mg tablet     Sig: TAKE 1 TABLET BY MOUTH EVERY 6 HOURS AS NEEDED PAIN     Dispense:  30 Tab     Refill:  0     Not to exceed 5 additional fills before 07/13/2019. The above controlled substance refill was called into [facility] pharmacy - [facility] - authorized by Dr. Angeles Jackson.

## 2019-06-21 ENCOUNTER — TELEPHONE (OUTPATIENT)
Dept: INTERNAL MEDICINE CLINIC | Age: 79
End: 2019-06-21

## 2019-06-21 DIAGNOSIS — I10 ESSENTIAL HYPERTENSION: ICD-10-CM

## 2019-06-21 NOTE — TELEPHONE ENCOUNTER
[id number], [address] (Pharmacy) [phone]     Pharmacy requesting new RX for Ezetimibe 10mg tabs, take 1 tab by mouth every day.

## 2019-06-24 RX ORDER — BISOPROLOL FUMARATE AND HYDROCHLOROTHIAZIDE 5; 6.25 MG/1; MG/1
1 TABLET ORAL DAILY
Qty: 90 TAB | Refills: 1 | Status: SHIPPED | OUTPATIENT
Start: 2019-06-24 | End: 2019-12-24 | Stop reason: SDUPTHER

## 2019-06-24 NOTE — TELEPHONE ENCOUNTER
Spoke with pt in ref to refill request. Wanted to verify that she was still not taking. She is not taking any meds for her cholesterol at this time because of myalgias and we are going to repeat labs to monitor. The rx she does need is her Ziac. Orders Placed This Encounter    bisoprolol-hydroCHLOROthiazide (ZIAC) 5-6.25 mg per tablet     Sig: Take 1 Tab by mouth daily. Dispense:  90 Tab     Refill:  1     The above orders were approved via [facility] per Dr. Ganesh Gaona, III.

## 2019-07-14 DIAGNOSIS — M54.9 BACK PAIN, UNSPECIFIED BACK LOCATION, UNSPECIFIED BACK PAIN LATERALITY, UNSPECIFIED CHRONICITY: ICD-10-CM

## 2019-07-14 RX ORDER — CARISOPRODOL 250 MG/1
TABLET ORAL
Qty: 50 TAB | Refills: 1 | OUTPATIENT
Start: 2019-07-14 | End: 2021-01-19

## 2019-07-15 NOTE — TELEPHONE ENCOUNTER
Orders Placed This Encounter    carisoprodol (SOMA) 250 mg tablet     Sig: TAKE 1 TABLET BY MOUTH FOUR TIMES A DAY *PA REQUIRED, FAX SENT 6/21*     Dispense:  50 Tab     Refill:  1     This request is for a new prescription for a controlled substance as required by Federal/State law. .     The above controlled substance refill was called into patients pharmacy -[facility]/ - authorized by Dr. Brian Benjamin.

## 2019-09-26 DIAGNOSIS — M54.9 BACK PAIN, UNSPECIFIED BACK LOCATION, UNSPECIFIED BACK PAIN LATERALITY, UNSPECIFIED CHRONICITY: Primary | ICD-10-CM

## 2019-09-26 RX ORDER — TRAMADOL HYDROCHLORIDE 50 MG/1
50 TABLET ORAL
Qty: 30 TAB | Refills: 0 | OUTPATIENT
Start: 2019-09-26 | End: 2019-10-10

## 2019-09-26 NOTE — TELEPHONE ENCOUNTER
Orders Placed This Encounter    traMADol (ULTRAM) 50 mg tablet     Sig: Take 1 Tab by mouth every six (6) hours as needed for Pain for up to 14 days. Max Daily Amount: 200 mg. Dispense:  30 Tab     Refill:  0     The above controlled substance refill was called into patients pharmacy - [facility]/ - authorized by Dr. Karo Alves.

## 2019-10-09 DIAGNOSIS — E78.2 MIXED HYPERLIPIDEMIA: ICD-10-CM

## 2019-10-09 DIAGNOSIS — M54.9 BACK PAIN, UNSPECIFIED BACK LOCATION, UNSPECIFIED BACK PAIN LATERALITY, UNSPECIFIED CHRONICITY: ICD-10-CM

## 2019-10-09 DIAGNOSIS — K57.32 DIVERTICULITIS OF COLON: ICD-10-CM

## 2019-10-09 DIAGNOSIS — R51.9 HEADACHE, UNSPECIFIED HEADACHE TYPE: ICD-10-CM

## 2019-10-09 DIAGNOSIS — I10 ESSENTIAL HYPERTENSION: ICD-10-CM

## 2019-10-09 DIAGNOSIS — R42 DIZZINESS: ICD-10-CM

## 2019-10-09 RX ORDER — DICLOFENAC SODIUM 75 MG/1
TABLET, DELAYED RELEASE ORAL
Qty: 90 TAB | Refills: 5 | Status: SHIPPED | OUTPATIENT
Start: 2019-10-09 | End: 2020-10-21

## 2019-12-02 DIAGNOSIS — F51.01 PRIMARY INSOMNIA: ICD-10-CM

## 2019-12-02 DIAGNOSIS — M54.9 BACK PAIN, UNSPECIFIED BACK LOCATION, UNSPECIFIED BACK PAIN LATERALITY, UNSPECIFIED CHRONICITY: Primary | ICD-10-CM

## 2019-12-03 RX ORDER — ZOLPIDEM TARTRATE 5 MG/1
TABLET ORAL
Qty: 30 TAB | Refills: 1 | Status: SHIPPED | OUTPATIENT
Start: 2019-12-03 | End: 2020-10-01 | Stop reason: SDUPTHER

## 2019-12-03 RX ORDER — TRAMADOL HYDROCHLORIDE 50 MG/1
TABLET ORAL
Qty: 30 TAB | Refills: 0 | Status: SHIPPED | OUTPATIENT
Start: 2019-12-03 | End: 2020-01-02

## 2019-12-12 ENCOUNTER — HOSPITAL ENCOUNTER (OUTPATIENT)
Dept: LAB | Age: 79
Discharge: HOME | End: 2019-12-12

## 2019-12-24 DIAGNOSIS — I10 ESSENTIAL HYPERTENSION: ICD-10-CM

## 2019-12-24 RX ORDER — BISOPROLOL FUMARATE AND HYDROCHLOROTHIAZIDE 5; 6.25 MG/1; MG/1
1 TABLET ORAL DAILY
Qty: 90 TAB | Refills: 0 | Status: SHIPPED | OUTPATIENT
Start: 2019-12-24 | End: 2020-03-18

## 2019-12-24 NOTE — TELEPHONE ENCOUNTER
Orders Placed This Encounter    bisoprolol-hydroCHLOROthiazide (ZIAC) 5-6.25 mg per tablet     Sig: Take 1 Tab by mouth daily. Dispense:  90 Tab     Refill:  0     The above orders were approved via [facility] per Dr. Sharron Canela, III.

## 2020-01-15 ENCOUNTER — OFFICE VISIT (OUTPATIENT)
Dept: INTERNAL MEDICINE CLINIC | Age: 80
End: 2020-01-15

## 2020-01-15 VITALS
DIASTOLIC BLOOD PRESSURE: 75 MMHG | RESPIRATION RATE: 14 BRPM | HEIGHT: 66 IN | HEART RATE: 63 BPM | SYSTOLIC BLOOD PRESSURE: 135 MMHG | WEIGHT: 168 LBS | BODY MASS INDEX: 27 KG/M2 | OXYGEN SATURATION: 99 % | TEMPERATURE: 98.4 F

## 2020-01-15 DIAGNOSIS — E55.9 VITAMIN D DEFICIENCY: ICD-10-CM

## 2020-01-15 DIAGNOSIS — E78.2 MIXED HYPERLIPIDEMIA: ICD-10-CM

## 2020-01-15 DIAGNOSIS — I10 ESSENTIAL HYPERTENSION: ICD-10-CM

## 2020-01-15 DIAGNOSIS — M54.40 CHRONIC BILATERAL LOW BACK PAIN WITH SCIATICA, SCIATICA LATERALITY UNSPECIFIED: ICD-10-CM

## 2020-01-15 DIAGNOSIS — Z00.00 MEDICARE ANNUAL WELLNESS VISIT, SUBSEQUENT: Primary | ICD-10-CM

## 2020-01-15 DIAGNOSIS — M85.89 OSTEOPENIA OF MULTIPLE SITES: ICD-10-CM

## 2020-01-15 DIAGNOSIS — K57.32 DIVERTICULITIS OF COLON: ICD-10-CM

## 2020-01-15 DIAGNOSIS — G89.29 CHRONIC BILATERAL LOW BACK PAIN WITH SCIATICA, SCIATICA LATERALITY UNSPECIFIED: ICD-10-CM

## 2020-01-15 RX ORDER — TRAMADOL HYDROCHLORIDE 50 MG/1
50 TABLET ORAL
COMMUNITY
End: 2020-05-27

## 2020-01-15 RX ORDER — CIPROFLOXACIN 500 MG/1
500 TABLET ORAL 2 TIMES DAILY
Qty: 20 TAB | Refills: 0 | Status: SHIPPED | OUTPATIENT
Start: 2020-01-15 | End: 2020-06-08 | Stop reason: SDUPTHER

## 2020-01-15 NOTE — PROGRESS NOTES
This is the Subsequent Medicare Annual Wellness Exam, performed 12 months or more after the Initial AWV or the last Subsequent AWV    I have reviewed the patient's medical history in detail and updated the computerized patient record. Also here for a follow-up of her health issues. She has been generally doing fairly well. She has recently had some lower abdominal discomfort with urinary frequency and some looser stools. She is not sure if it is diverticulitis or related to her interstitial cystitis. She denies any fevers or chills. Denies any vomiting. Her arthritis is under good control using diclofenac once per day. History     Patient Active Problem List   Diagnosis Code    Diverticulitis of colon K57.32    Back pain M54.9    Mixed hyperlipidemia E78.2    Essential hypertension I10    Vitamin D deficiency E55.9    Advance directive discussed with patient Z70.80    Cephalalgia R50     Past Medical History:   Diagnosis Date    Arthritis     DDD BACK PAIN    Chest pain, atypical     Chronic pain     HIP    Diverticulitis of colon     Fibrocystic disease of breast     Hypercholesterolemia     Hypertension     IBS (irritable bowel syndrome)     Menopause 1968    Ovarian cyst       Past Surgical History:   Procedure Laterality Date    ENDOSCOPY, COLON, DIAGNOSTIC  10/22/2010    5 yr fu, Brand    HX APPENDECTOMY      HX COLONOSCOPY  10/20/2016    Dr. Flynn Merino - 5 yr f/u    HX DILATION AND CURETTAGE      HX TONSILLECTOMY       Current Outpatient Medications   Medication Sig Dispense Refill    traMADol (ULTRAM) 50 mg tablet Take 50 mg by mouth every six (6) hours as needed for Pain.  bisoprolol-hydroCHLOROthiazide (ZIAC) 5-6.25 mg per tablet Take 1 Tab by mouth daily. 90 Tab 0    zolpidem (AMBIEN) 5 mg tablet TAKE 1/2 TO 1 TABLET BY MOUTH AT BEDTIME AS NEEDED 30 Tab 1    diclofenac EC (VOLTAREN) 75 mg EC tablet TAKE 1 TAB BY MOUTH ONCE A DAY AS NEEDED.  90 Tab 5    carisoprodol (SOMA) 250 mg tablet TAKE 1 TABLET BY MOUTH FOUR TIMES A DAY *PA REQUIRED, FAX SENT 6/21* 50 Tab 1    esomeprazole (NEXIUM) 20 mg capsule Take  by mouth daily.  B.infantis-B.ani-B.long-B.bifi (PROBIOTIC 4X) 10-15 mg TbEC Take  by mouth.  olopatadine (PATANOL) 0.1 % ophthalmic solution Administer 2 Drops to both eyes two (2) times daily as needed.  sulfacetamide-prednisoLONE (BLEPHAMIDE) 10-0.2 % ophthalmic suspension Administer 1 Drop to both eyes every three (3) hours as needed.  Mth-Me Blue-Sod Phos-PhSal-Hyo (URIBEL) 118-10-40.8-36 mg cap capsule Take 1 Cap by mouth nightly as needed. (Patient taking differently: Take 1 Cap by mouth four (4) times daily as needed.) 90 Cap 2    cholecalciferol, vitamin d3, (VITAMIN D) 1,000 unit tablet Take 1,000 Units by mouth daily.  MULTIVITAMINS W-MINERALS/LUT (CENTRUM SILVER PO) Take  by mouth.  aspirin delayed-release 81 mg tablet Take 81 mg by mouth daily. Allergies   Allergen Reactions    Pcn [Penicillins] Swelling     Arm with injection    Sonata [Zaleplon] Itching       Family History   Problem Relation Age of Onset   Dulcynea.Sunil Arthritis-rheumatoid Mother     Heart Disease Father     Hypertension Sister     Cancer Maternal Uncle     Cancer Maternal Grandfather     Other Sister         IBS     Social History     Tobacco Use    Smoking status: Never Smoker    Smokeless tobacco: Never Used   Substance Use Topics    Alcohol use:  Yes     Alcohol/week: 1.7 - 3.3 standard drinks     Types: 2 - 4 Standard drinks or equivalent per week   ROS - Per HPI  Physical Examination: General appearance - alert, well appearing, and in no distress  Eyes - pupils equal and reactive, extraocular eye movements intact  Ears - bilateral TM's and external ear canals normal  Nose - normal and patent, no erythema, discharge or polyps  Mouth - mucous membranes moist, pharynx normal without lesions  Neck - supple, no significant adenopathy  Lymphatics - no palpable lymphadenopathy, no hepatosplenomegaly  Chest - clear to auscultation, no wheezes, rales or rhonchi, symmetric air entry  Heart - normal rate, regular rhythm, normal S1, S2, no murmurs, rubs, clicks or gallops  Abdomen -bowel sounds are active. There is some mild tenderness in the suprapubic area. No rebound or guarding. No masses. Back exam - full range of motion, no tenderness, palpable spasm or pain on motion  Neurological - alert, oriented, normal speech, no focal findings or movement disorder noted  Musculoskeletal - no joint tenderness, deformity or swelling  Extremities - peripheral pulses normal, no pedal edema, no clubbing or cyanosis      Depression Risk Factor Screening:     3 most recent PHQ Screens 1/15/2020   Little interest or pleasure in doing things Several days   Feeling down, depressed, irritable, or hopeless Several days   Total Score PHQ 2 2       Alcohol Risk Factor Screening:   Do you average 1 drink per night or more than 7 drinks a week:  No    On any one occasion in the past three months have you have had more than 3 drinks containing alcohol:  No      Functional Ability and Level of Safety:   Hearing: Hearing is good. Activities of Daily Living: The home contains: no safety equipment. Patient does total self care    Ambulation: with no difficulty    Fall Risk:  Fall Risk Assessment, last 12 mths 1/15/2020   Able to walk? Yes   Fall in past 12 months?  No   Fall with injury? -   Number of falls in past 12 months -   Fall Risk Score -       Abuse Screen:  Patient is not abused    Cognitive Screening   Has your family/caregiver stated any concerns about your memory: no      Patient Care Team   Patient Care Team:  Lupe Coello MD as PCP - General  Lupe Coello MD as PCP - REHABILITATION [facility] Empaneled Provider  Naomie Gonzalez MD (Gastroenterology)  Andrew Peralta MD (Ophthalmology)  Virgilio Navarro MD (Otolaryngology)  Melissa Tomlinson MD (Urology)    Assessment/Plan   Education and counseling provided:  Are appropriate based on today's review and evaluation  Screening Mammography  Diabetes screening test    Diagnoses and all orders for this visit:    1. Essential hypertension-blood pressure well controlled on current meds. We will continue those for now. 2. Diverticulitis of colon-we will treat with Cipro for a course. She will let me know if her symptoms do not improve would consider repeat imaging.  -     ciprofloxacin HCl (CIPRO) 500 mg tablet; Take 1 Tab by mouth two (2) times a day. 3. Chronic bilateral low back pain with sciatica, sciatica laterality unspecified -stable on diclofenac. She will try to use that as needed. 4. Mixed hyperlipidemia -diet controlled. We will continue to monitor to be sure that this is adequate. 5. Vitamin D deficiency -repleted in the past.  Will defer levels for now. 6. Medicare annual wellness visit, subsequent    Other orders  -     varicella-zoster recombinant, PF, (SHINGRIX, PF,) 50 mcg/0.5 mL susr injection; 0.5 mL by IntraMUSCular route once for 1 dose. -     CBC WITH AUTOMATED DIFF  -     METABOLIC PANEL, COMPREHENSIVE  -     LIPID PANEL  -     TSH RFX ON ABNORMAL TO FREE T4  -     mth-me blue-sod phos-phsal-hyo (URIBEL) 118-10-40.8-36 mg cap capsule; Take 1 Cap by mouth four (4) times daily as needed.         Health Maintenance Due   Topic Date Due    Shingrix Vaccine Age 49> (1 of 2) 05/12/1988    GLAUCOMA SCREENING Q2Y  09/15/2018    MEDICARE YEARLY EXAM  01/15/2020

## 2020-01-15 NOTE — PATIENT INSTRUCTIONS
Medicare Wellness Visit, Female The best way to live healthy is to have a lifestyle where you eat a well-balanced diet, exercise regularly, limit alcohol use, and quit all forms of tobacco/nicotine, if applicable. Regular preventive services are another way to keep healthy. Preventive services (vaccines, screening tests, monitoring & exams) can help personalize your care plan, which helps you manage your own care. Screening tests can find health problems at the earliest stages, when they are easiest to treat. [facility] follows the current, evidence-based guidelines published by the [facility] W[facility] Crocker ([facility]) when recommending preventive services for our patients. Because we follow these guidelines, sometimes recommendations change over time as research supports it. (For example, mammograms used to be recommended annually. Even though Medicare will still pay for an annual mammogram, the newer guidelines recommend a mammogram every two years for women of average risk). Of course, you and your doctor may decide to screen more often for some diseases, based on your risk and your co-morbidities (chronic disease you are already diagnosed with). Preventive services for you include: - Medicare offers their members a free annual wellness visit, which is time for you and your primary care provider to discuss and plan for your preventive service needs. Take advantage of this benefit every year! 
-All adults over the age of 72 should receive the recommended pneumonia vaccines. Current USPSTF guidelines recommend a series of two vaccines for the best pneumonia protection.  
-All adults should have a flu vaccine yearly and a tetanus vaccine every 10 years.  
-All adults age 48 and older should receive the shingles vaccines (series of two vaccines). -All adults age 38-68 who are overweight should have a diabetes screening test once every three years. -All adults born between [birth year] and 1965 should be screened once for Hepatitis C. 
-Other screening tests and preventive services for persons with diabetes include: an eye exam to screen for diabetic retinopathy, a kidney function test, a foot exam, and stricter control over your cholesterol.  
-Cardiovascular screening for adults with routine risk involves an electrocardiogram (ECG) at intervals determined by your doctor.  
-Colorectal cancer screenings should be done for adults age 54-65 with no increased risk factors for colorectal cancer. There are a number of acceptable methods of screening for this type of cancer. Each test has its own benefits and drawbacks. Discuss with your doctor what is most appropriate for you during your annual wellness visit. The different tests include: colonoscopy (considered the best screening method), a fecal occult blood test, a fecal DNA test, and sigmoidoscopy. 
 
-A bone mass density test is recommended when a woman turns 65 to screen for osteoporosis. This test is only recommended one time, as a screening. Some providers will use this same test as a disease monitoring tool if you already have osteoporosis. -Breast cancer screenings are recommended every other year for women of normal risk, age 54-69. 
-Cervical cancer screenings for women over age 72 are only recommended with certain risk factors. Here is a list of your current Health Maintenance items (your personalized list of preventive services) with a due date: 
Health Maintenance Due Topic Date Due  Shingles Vaccine (1 of 2) 05/12/1988  Glaucoma Screening   09/15/2018 Angelo Jaeger Annual Well Visit  01/15/2020

## 2020-01-20 ENCOUNTER — HOSPITAL ENCOUNTER (OUTPATIENT)
Dept: LAB | Age: 80
Discharge: HOME | End: 2020-01-20
Payer: MEDICARE

## 2020-01-20 PROCEDURE — 80061 LIPID PANEL: CPT

## 2020-01-20 PROCEDURE — 36415 COLL VENOUS BLD VENIPUNCTURE: CPT

## 2020-01-20 PROCEDURE — 84443 ASSAY THYROID STIM HORMONE: CPT

## 2020-01-20 PROCEDURE — 85025 COMPLETE CBC W/AUTO DIFF WBC: CPT

## 2020-01-20 PROCEDURE — 80053 COMPREHEN METABOLIC PANEL: CPT

## 2020-01-21 LAB
ALBUMIN SERPL-MCNC: 4.5 G/DL (ref 3.6–4.6)
ALBUMIN/GLOB SERPL: 2.1 {RATIO} (ref 1.2–2.2)
ALP SERPL-CCNC: 69 IU/L (ref 39–117)
ALT SERPL-CCNC: 15 IU/L (ref 0–32)
AST SERPL-CCNC: 19 IU/L (ref 0–40)
BASOPHILS # BLD AUTO: 0.1 X10E3/UL (ref 0–0.2)
BASOPHILS NFR BLD AUTO: 1 %
BILIRUB SERPL-MCNC: 0.3 MG/DL (ref 0–1.2)
BUN SERPL-MCNC: 13 MG/DL (ref 8–27)
BUN/CREAT SERPL: 19 (ref 12–28)
CALCIUM SERPL-MCNC: 10.3 MG/DL (ref 8.7–10.3)
CHLORIDE SERPL-SCNC: 98 MMOL/L (ref 96–106)
CHOLEST SERPL-MCNC: 215 MG/DL (ref 100–199)
CO2 SERPL-SCNC: 25 MMOL/L (ref 20–29)
CREAT SERPL-MCNC: 0.7 MG/DL (ref 0.57–1)
EOSINOPHIL # BLD AUTO: 0.1 X10E3/UL (ref 0–0.4)
EOSINOPHIL NFR BLD AUTO: 2 %
ERYTHROCYTE [DISTWIDTH] IN BLOOD BY AUTOMATED COUNT: 12.9 % (ref 11.7–15.4)
GLOBULIN SER CALC-MCNC: 2.1 G/DL (ref 1.5–4.5)
GLUCOSE SERPL-MCNC: 95 MG/DL (ref 65–99)
HCT VFR BLD AUTO: 42.9 % (ref 34–46.6)
HDLC SERPL-MCNC: 56 MG/DL
HGB BLD-MCNC: 13.8 G/DL (ref 11.1–15.9)
IMM GRANULOCYTES # BLD AUTO: 0 X10E3/UL (ref 0–0.1)
IMM GRANULOCYTES NFR BLD AUTO: 0 %
LDLC SERPL CALC-MCNC: 138 MG/DL (ref 0–99)
LYMPHOCYTES # BLD AUTO: 2.1 X10E3/UL (ref 0.7–3.1)
LYMPHOCYTES NFR BLD AUTO: 38 %
MCH RBC QN AUTO: 29.1 PG (ref 26.6–33)
MCHC RBC AUTO-ENTMCNC: 32.2 G/DL (ref 31.5–35.7)
MCV RBC AUTO: 90 FL (ref 79–97)
MONOCYTES # BLD AUTO: 0.4 X10E3/UL (ref 0.1–0.9)
MONOCYTES NFR BLD AUTO: 8 %
NEUTROPHILS # BLD AUTO: 2.9 X10E3/UL (ref 1.4–7)
NEUTROPHILS NFR BLD AUTO: 51 %
PLATELET # BLD AUTO: 245 X10E3/UL (ref 150–450)
POTASSIUM SERPL-SCNC: 4.5 MMOL/L (ref 3.5–5.2)
PROT SERPL-MCNC: 6.6 G/DL (ref 6–8.5)
RBC # BLD AUTO: 4.75 X10E6/UL (ref 3.77–5.28)
SODIUM SERPL-SCNC: 139 MMOL/L (ref 134–144)
TRIGL SERPL-MCNC: 103 MG/DL (ref 0–149)
TSH SERPL DL<=0.005 MIU/L-ACNC: 3.75 UIU/ML (ref 0.45–4.5)
VLDLC SERPL CALC-MCNC: 21 MG/DL (ref 5–40)
WBC # BLD AUTO: 5.5 X10E3/UL (ref 3.4–10.8)

## 2020-01-28 ENCOUNTER — HOSPITAL ENCOUNTER (OUTPATIENT)
Dept: MAMMOGRAPHY | Age: 80
Discharge: HOME | End: 2020-01-28
Payer: MEDICARE

## 2020-01-28 DIAGNOSIS — E55.9 VITAMIN D DEFICIENCY: ICD-10-CM

## 2020-01-28 DIAGNOSIS — M85.89 OSTEOPENIA OF MULTIPLE SITES: ICD-10-CM

## 2020-01-28 DIAGNOSIS — Z12.31 ENCOUNTER FOR MAMMOGRAM TO ESTABLISH BASELINE MAMMOGRAM: ICD-10-CM

## 2020-01-28 PROCEDURE — 77067 SCR MAMMO BI INCL CAD: CPT

## 2020-01-28 PROCEDURE — 77080 DXA BONE DENSITY AXIAL: CPT

## 2020-02-19 ENCOUNTER — TELEPHONE (OUTPATIENT)
Dept: INTERNAL MEDICINE CLINIC | Age: 80
End: 2020-02-19

## 2020-03-18 DIAGNOSIS — I10 ESSENTIAL HYPERTENSION: ICD-10-CM

## 2020-03-18 RX ORDER — BISOPROLOL FUMARATE AND HYDROCHLOROTHIAZIDE 5; 6.25 MG/1; MG/1
TABLET ORAL
Qty: 90 TAB | Refills: 0 | Status: SHIPPED | OUTPATIENT
Start: 2020-03-18 | End: 2020-06-10

## 2020-05-27 DIAGNOSIS — M54.40 CHRONIC BILATERAL LOW BACK PAIN WITH SCIATICA, SCIATICA LATERALITY UNSPECIFIED: Primary | ICD-10-CM

## 2020-05-27 DIAGNOSIS — G89.29 CHRONIC BILATERAL LOW BACK PAIN WITH SCIATICA, SCIATICA LATERALITY UNSPECIFIED: Primary | ICD-10-CM

## 2020-05-27 RX ORDER — TRAMADOL HYDROCHLORIDE 50 MG/1
TABLET ORAL
Qty: 30 TAB | Refills: 0 | Status: SHIPPED | OUTPATIENT
Start: 2020-05-27 | End: 2020-06-26

## 2020-06-08 ENCOUNTER — OFFICE VISIT (OUTPATIENT)
Dept: INTERNAL MEDICINE CLINIC | Age: 80
End: 2020-06-08

## 2020-06-08 ENCOUNTER — HOSPITAL ENCOUNTER (OUTPATIENT)
Dept: LAB | Age: 80
Discharge: HOME | End: 2020-06-08
Payer: MEDICARE

## 2020-06-08 ENCOUNTER — TELEPHONE (OUTPATIENT)
Dept: INTERNAL MEDICINE CLINIC | Age: 80
End: 2020-06-08

## 2020-06-08 VITALS
HEART RATE: 71 BPM | HEIGHT: 66 IN | SYSTOLIC BLOOD PRESSURE: 136 MMHG | OXYGEN SATURATION: 97 % | DIASTOLIC BLOOD PRESSURE: 71 MMHG | RESPIRATION RATE: 14 BRPM | TEMPERATURE: 97.4 F | BODY MASS INDEX: 27.06 KG/M2 | WEIGHT: 168.4 LBS

## 2020-06-08 DIAGNOSIS — M17.0 PRIMARY OSTEOARTHRITIS OF BOTH KNEES: ICD-10-CM

## 2020-06-08 DIAGNOSIS — G89.29 CHRONIC BILATERAL LOW BACK PAIN WITH SCIATICA, SCIATICA LATERALITY UNSPECIFIED: ICD-10-CM

## 2020-06-08 DIAGNOSIS — K57.32 DIVERTICULITIS OF COLON: Primary | ICD-10-CM

## 2020-06-08 DIAGNOSIS — I10 ESSENTIAL HYPERTENSION: ICD-10-CM

## 2020-06-08 DIAGNOSIS — R30.0 DYSURIA: ICD-10-CM

## 2020-06-08 DIAGNOSIS — M54.40 CHRONIC BILATERAL LOW BACK PAIN WITH SCIATICA, SCIATICA LATERALITY UNSPECIFIED: ICD-10-CM

## 2020-06-08 LAB
BILIRUB UR QL STRIP: NEGATIVE
GLUCOSE UR-MCNC: NEGATIVE MG/DL
KETONES P FAST UR STRIP-MCNC: NEGATIVE MG/DL
PH UR STRIP: 6.5 [PH] (ref 4.6–8)
PROT UR QL STRIP: NEGATIVE
SP GR UR STRIP: 1.01 (ref 1–1.03)
UA UROBILINOGEN AMB POC: NORMAL (ref 0.2–1)
URINALYSIS CLARITY POC: CLEAR
URINALYSIS COLOR POC: YELLOW
URINE BLOOD POC: NEGATIVE
URINE LEUKOCYTES POC: NORMAL
URINE NITRITES POC: NEGATIVE

## 2020-06-08 PROCEDURE — 81001 URINALYSIS AUTO W/SCOPE: CPT

## 2020-06-08 PROCEDURE — 87086 URINE CULTURE/COLONY COUNT: CPT

## 2020-06-08 RX ORDER — CIPROFLOXACIN 500 MG/1
500 TABLET ORAL 2 TIMES DAILY
Qty: 20 TAB | Refills: 0 | Status: SHIPPED | OUTPATIENT
Start: 2020-06-08 | End: 2020-10-01 | Stop reason: SDUPTHER

## 2020-06-08 NOTE — PROGRESS NOTES
HPI:  Veronica Yoder is a 78y.o. year old female who is here for several issues. Over the last couple weeks she has had cramping lower abdominal discomfort associated with some increased stool frequency. Her stools have been somewhat looser than normal.  Some dysuria as well. No fevers or chills. No sweats. No nausea or vomiting. She feels that this is similar to when she has had diverticulitis in the past.  She has noted some increased urinary frequency as well. She has ongoing issues with lower back pain as well as bilateral knee and hip pain. She is currently taking Tylenol for her joints as well as some occasional Voltaren and tramadol with reasonable success. Denies any blood in her stools. No mucus in her stools. No blood in her urine. Past Medical History:   Diagnosis Date    Arthritis     DDD BACK PAIN    Chest pain, atypical     Chronic pain     HIP    Diverticulitis of colon     Fibrocystic disease of breast     Hypercholesterolemia     Hypertension     IBS (irritable bowel syndrome)     Menopause 1968    Ovarian cyst        Past Surgical History:   Procedure Laterality Date    ENDOSCOPY, COLON, DIAGNOSTIC  10/22/2010    5 yr fu, Brand    HX APPENDECTOMY      HX COLONOSCOPY  10/20/2016    Dr. Gaetano Garcia - 5 yr f/u    HX DILATION AND CURETTAGE      HX TONSILLECTOMY         Prior to Admission medications    Medication Sig Start Date End Date Taking? Authorizing Provider   ciprofloxacin HCl (CIPRO) 500 mg tablet Take 1 Tab by mouth two (2) times a day. 6/8/20  Yes Honey Posada MD   traMADoL (ULTRAM) 50 mg tablet TAKE 1 TABLET BY MOUTH EVERY 6 HOURS AS NEEDED 5/27/20 6/26/20 Yes Honey Posada MD   bisoprolol-hydroCHLOROthiazide [facility]) 5-6.25 mg per tablet TAKE 1 TABLET BY MOUTH EVERY DAY 3/18/20  Yes Hi Bowels MD [facility]   [facility]-me blue-sod phos-phsal-hyo (URIBEL) 921-11-64.2-74 mg cap capsule Take 1 Cap by mouth four (4) times daily as needed for Pain.  1/15/20  Yes Mary Kate Hansen MD   zolpidem (AMBIEN) 5 mg tablet TAKE 1/2 TO 1 TABLET BY MOUTH AT BEDTIME AS NEEDED 12/3/19  Yes Victoria Galvez III, MD   diclofenac EC (VOLTAREN) 75 mg EC tablet TAKE 1 TAB BY MOUTH ONCE A DAY AS NEEDED. 10/9/19  Yes Mary Kate Hansen MD   carisoprodol (SOMA) 250 mg tablet TAKE 1 TABLET BY MOUTH FOUR TIMES A DAY *PA REQUIRED, FAX SENT 6/21* 7/14/19  Yes Mary Kate Hansen MD   esomeprazole (NEXIUM) 20 mg capsule Take  by mouth daily. Yes Provider, Historical   B.infantis-B.ani-B.long-B.bifi (PROBIOTIC 4X) 10-15 mg TbEC Take  by mouth. Yes Provider, Historical   cholecalciferol, vitamin d3, (VITAMIN D) 1,000 unit tablet Take 1,000 Units by mouth daily. Yes Provider, Historical   MULTIVITAMINS W-MINERALS/LUT (CENTRUM SILVER PO) Take  by mouth. Yes Provider, Historical   aspirin delayed-release 81 mg tablet Take 81 mg by mouth daily. Yes Provider, Historical   ciprofloxacin HCl (CIPRO) 500 mg tablet Take 1 Tab by mouth two (2) times a day. 1/15/20 6/8/20  Victoria Galvez III, MD   olopatadine (PATANOL) 0.1 % ophthalmic solution Administer 2 Drops to both eyes two (2) times daily as needed. Provider, Historical   sulfacetamide-prednisoLONE (BLEPHAMIDE) 10-0.2 % ophthalmic suspension Administer 1 Drop to both eyes every three (3) hours as needed. 6/8/20  Provider, Historical       Social History     Socioeconomic History    Marital status:      Spouse name: Not on file    Number of children: Not on file    Years of education: Not on file    Highest education level: Not on file   Occupational History    Not on file   Social Needs    Financial resource strain: Not on file    Food insecurity     Worry: Not on file     Inability: Not on file    Transportation needs     Medical: Not on file     Non-medical: Not on file   Tobacco Use    Smoking status: Never Smoker    Smokeless tobacco: Never Used   Substance and Sexual Activity    Alcohol use:  Yes     Alcohol/week: 1.7 - 3.3 standard drinks     Types: 2 - 4 Standard drinks or equivalent per week    Drug use: No    Sexual activity: Never   Lifestyle    Physical activity     Days per week: Not on file     Minutes per session: Not on file    Stress: Not on file   Relationships    Social connections     Talks on phone: Not on file     Gets together: Not on file     Attends Scientologist service: Not on file     Active member of club or organization: Not on file     Attends meetings of clubs or organizations: Not on file     Relationship status: Not on file    Intimate partner violence     Fear of current or ex partner: Not on file     Emotionally abused: Not on file     Physically abused: Not on file     Forced sexual activity: Not on file   Other Topics Concern    Not on file   Social History Narrative    Not on file          ROS  Per HPI    Visit Vitals  /71   Pulse 71   Temp 97.4 °F (36.3 °C) (Temporal)   Resp 14   Ht 5' 6\" (1.676 m)   Wt 168 lb 6.4 oz (76.4 kg)   SpO2 97%   BMI 27.18 kg/m²         Physical Exam   Physical Examination: General appearance - alert, well appearing, and in no distress  Chest - clear to auscultation, no wheezes, rales or rhonchi, symmetric air entry  Heart - normal rate and regular rhythm  Abdomen - tenderness noted in the lower right and left quadrants. no rebound tenderness noted  bowel sounds normal  Back exam - full range of motion, no tenderness, palpable spasm or pain on motion  Neurological - alert, oriented, normal speech, no focal findings or movement disorder noted  Musculoskeletal - abnormal exam of both knees with DJD changes.    Extremities - peripheral pulses normal, no pedal edema, no clubbing or cyanosis    Results for orders placed or performed in visit on 06/08/20   AMB POC URINALYSIS DIP STICK AUTO W/O MICRO   Result Value Ref Range    Color (UA POC) Yellow     Clarity (UA POC) Clear     Glucose (UA POC) Negative Negative    Bilirubin (UA POC) Negative Negative Ketones (UA POC) Negative Negative    Specific gravity (UA POC) 1.015 1.001 - 1.035    Blood (UA POC) Negative Negative    pH (UA POC) 6.5 4.6 - 8.0    Protein (UA POC) Negative Negative    Urobilinogen (UA POC) 0.2 mg/dL 0.2 - 1    Nitrites (UA POC) Negative Negative    Leukocyte esterase (UA POC) 1+ Negative       Assessment/Plan:  Diagnoses and all orders for this visit:    1. Diverticulitis of colon -recurrent. Will treat with Cipro that she is responded to in the past.  If she has fever she will let me know. If her symptoms do not resolve she will let me know. Last colonoscopy was in 2016 and otherwise unremarkable. -     ciprofloxacin HCl (CIPRO) 500 mg tablet; Take 1 Tab by mouth two (2) times a day. 2. Dysuria -some pyuria today. Will send urine for culture and treat if needed with additional meds. -     AMB POC URINALYSIS DIP STICK AUTO W/O MICRO  -     UA/M W/RFLX CULTURE, COMP    3. Chronic bilateral low back pain with sciatica, sciatica laterality unspecified -stable on current meds. 4. Essential hypertension -well-controlled on current meds and will continue these. 5. Primary osteoarthritis of both knees -discussed physical therapy or pool therapy and she will consider these in addition to the current meds. Advised her to call back or return to office if symptoms worsen/change/persist.  Discussed expected course/resolution/complications of diagnosis in detail with patient. Medication risks/benefits/costs/interactions/alternatives discussed with patient. She was given an after visit summary which includes diagnoses, current medications, & vitals. She expressed understanding with the diagnosis and plan.

## 2020-06-10 DIAGNOSIS — I10 ESSENTIAL HYPERTENSION: ICD-10-CM

## 2020-06-10 RX ORDER — BISOPROLOL FUMARATE AND HYDROCHLOROTHIAZIDE 5; 6.25 MG/1; MG/1
TABLET ORAL
Qty: 90 TAB | Refills: 0 | Status: SHIPPED | OUTPATIENT
Start: 2020-06-10 | End: 2020-09-03

## 2020-06-11 LAB
APPEARANCE UR: CLEAR
BACTERIA #/AREA URNS HPF: NORMAL /[HPF]
BACTERIA UR CULT: NORMAL
BILIRUB UR QL STRIP: NEGATIVE
CASTS URNS QL MICRO: NORMAL /LPF
COLOR UR: YELLOW
EPI CELLS #/AREA URNS HPF: NORMAL /HPF (ref 0–10)
GLUCOSE UR QL: NEGATIVE
HGB UR QL STRIP: NEGATIVE
KETONES UR QL STRIP: NEGATIVE
LEUKOCYTE ESTERASE UR QL STRIP: ABNORMAL
MICRO URNS: ABNORMAL
NITRITE UR QL STRIP: NEGATIVE
PH UR STRIP: 6 [PH] (ref 5–7.5)
PROT UR QL STRIP: NEGATIVE
RBC #/AREA URNS HPF: NORMAL /HPF (ref 0–2)
SP GR UR: 1.01 (ref 1–1.03)
URINALYSIS REFLEX , 377201: ABNORMAL
UROBILINOGEN UR STRIP-MCNC: 0.2 MG/DL (ref 0.2–1)
WBC #/AREA URNS HPF: NORMAL /HPF (ref 0–5)

## 2020-09-03 DIAGNOSIS — I10 ESSENTIAL HYPERTENSION: ICD-10-CM

## 2020-09-03 RX ORDER — BISOPROLOL FUMARATE AND HYDROCHLOROTHIAZIDE 5; 6.25 MG/1; MG/1
TABLET ORAL
Qty: 90 TAB | Refills: 0 | Status: SHIPPED | OUTPATIENT
Start: 2020-09-03 | End: 2020-12-17

## 2020-10-01 ENCOUNTER — OFFICE VISIT (OUTPATIENT)
Dept: INTERNAL MEDICINE CLINIC | Age: 80
End: 2020-10-01
Payer: MEDICARE

## 2020-10-01 ENCOUNTER — HOSPITAL ENCOUNTER (OUTPATIENT)
Dept: LAB | Age: 80
Discharge: HOME | End: 2020-10-01
Payer: MEDICARE

## 2020-10-01 VITALS
SYSTOLIC BLOOD PRESSURE: 144 MMHG | HEIGHT: 66 IN | BODY MASS INDEX: 27.77 KG/M2 | DIASTOLIC BLOOD PRESSURE: 80 MMHG | WEIGHT: 172.8 LBS | RESPIRATION RATE: 12 BRPM | HEART RATE: 65 BPM | OXYGEN SATURATION: 97 % | TEMPERATURE: 98 F

## 2020-10-01 DIAGNOSIS — M79.671 PAIN IN BOTH FEET: ICD-10-CM

## 2020-10-01 DIAGNOSIS — M79.10 MUSCLE PAIN: ICD-10-CM

## 2020-10-01 DIAGNOSIS — G62.9 NEUROPATHY: Primary | ICD-10-CM

## 2020-10-01 DIAGNOSIS — F51.01 PRIMARY INSOMNIA: ICD-10-CM

## 2020-10-01 DIAGNOSIS — K57.32 DIVERTICULITIS OF COLON: ICD-10-CM

## 2020-10-01 DIAGNOSIS — M79.672 PAIN IN BOTH FEET: ICD-10-CM

## 2020-10-01 DIAGNOSIS — E55.9 VITAMIN D DEFICIENCY: ICD-10-CM

## 2020-10-01 PROCEDURE — 85651 RBC SED RATE NONAUTOMATED: CPT

## 2020-10-01 PROCEDURE — G8510 SCR DEP NEG, NO PLAN REQD: HCPCS | Performed by: INTERNAL MEDICINE

## 2020-10-01 PROCEDURE — G8536 NO DOC ELDER MAL SCRN: HCPCS | Performed by: INTERNAL MEDICINE

## 2020-10-01 PROCEDURE — 84443 ASSAY THYROID STIM HORMONE: CPT

## 2020-10-01 PROCEDURE — 3288F FALL RISK ASSESSMENT DOCD: CPT | Performed by: INTERNAL MEDICINE

## 2020-10-01 PROCEDURE — G8427 DOCREV CUR MEDS BY ELIG CLIN: HCPCS | Performed by: INTERNAL MEDICINE

## 2020-10-01 PROCEDURE — 1100F PTFALLS ASSESS-DOCD GE2>/YR: CPT | Performed by: INTERNAL MEDICINE

## 2020-10-01 PROCEDURE — 80053 COMPREHEN METABOLIC PANEL: CPT

## 2020-10-01 PROCEDURE — 85025 COMPLETE CBC W/AUTO DIFF WBC: CPT

## 2020-10-01 PROCEDURE — 82607 VITAMIN B-12: CPT

## 2020-10-01 PROCEDURE — 1090F PRES/ABSN URINE INCON ASSESS: CPT | Performed by: INTERNAL MEDICINE

## 2020-10-01 PROCEDURE — 82306 VITAMIN D 25 HYDROXY: CPT

## 2020-10-01 PROCEDURE — 36415 COLL VENOUS BLD VENIPUNCTURE: CPT

## 2020-10-01 PROCEDURE — G8754 DIAS BP LESS 90: HCPCS | Performed by: INTERNAL MEDICINE

## 2020-10-01 PROCEDURE — 82550 ASSAY OF CK (CPK): CPT

## 2020-10-01 PROCEDURE — 99214 OFFICE O/P EST MOD 30 MIN: CPT | Performed by: INTERNAL MEDICINE

## 2020-10-01 PROCEDURE — G8399 PT W/DXA RESULTS DOCUMENT: HCPCS | Performed by: INTERNAL MEDICINE

## 2020-10-01 PROCEDURE — G8419 CALC BMI OUT NRM PARAM NOF/U: HCPCS | Performed by: INTERNAL MEDICINE

## 2020-10-01 PROCEDURE — G0463 HOSPITAL OUTPT CLINIC VISIT: HCPCS | Performed by: INTERNAL MEDICINE

## 2020-10-01 PROCEDURE — G8753 SYS BP > OR = 140: HCPCS | Performed by: INTERNAL MEDICINE

## 2020-10-01 RX ORDER — CIPROFLOXACIN 500 MG/1
500 TABLET ORAL 2 TIMES DAILY
Qty: 20 TAB | Refills: 0 | Status: SHIPPED | OUTPATIENT
Start: 2020-10-01 | End: 2020-12-16 | Stop reason: ALTCHOICE

## 2020-10-01 RX ORDER — FLUOROURACIL 50 MG/G
CREAM TOPICAL
COMMUNITY
Start: 2020-09-30 | End: 2021-01-19

## 2020-10-01 RX ORDER — ZOLPIDEM TARTRATE 5 MG/1
TABLET ORAL
Qty: 30 TAB | Refills: 1 | Status: SHIPPED | OUTPATIENT
Start: 2020-10-01 | End: 2021-05-11

## 2020-10-01 RX ORDER — TRAMADOL HYDROCHLORIDE 50 MG/1
50 TABLET ORAL
COMMUNITY
End: 2020-10-15

## 2020-10-01 RX ORDER — ACETAMINOPHEN 500 MG
TABLET ORAL
COMMUNITY

## 2020-10-01 RX ORDER — ZINC GLUCONATE 13.3 MG
200 LOZENGE ORAL AS NEEDED
COMMUNITY

## 2020-10-02 LAB
25(OH)D3+25(OH)D2 SERPL-MCNC: 33.5 NG/ML (ref 30–100)
ALBUMIN SERPL-MCNC: 4.7 G/DL (ref 3.6–4.6)
ALBUMIN/GLOB SERPL: 2.4 {RATIO} (ref 1.2–2.2)
ALP SERPL-CCNC: 64 IU/L (ref 39–117)
ALT SERPL-CCNC: 17 IU/L (ref 0–32)
AST SERPL-CCNC: 17 IU/L (ref 0–40)
BASOPHILS # BLD AUTO: 0 X10E3/UL (ref 0–0.2)
BASOPHILS NFR BLD AUTO: 1 %
BILIRUB SERPL-MCNC: 0.3 MG/DL (ref 0–1.2)
BUN SERPL-MCNC: 12 MG/DL (ref 8–27)
BUN/CREAT SERPL: 19 (ref 12–28)
CALCIUM SERPL-MCNC: 10.2 MG/DL (ref 8.7–10.3)
CHLORIDE SERPL-SCNC: 97 MMOL/L (ref 96–106)
CK SERPL-CCNC: 151 U/L (ref 26–161)
CO2 SERPL-SCNC: 26 MMOL/L (ref 20–29)
CREAT SERPL-MCNC: 0.64 MG/DL (ref 0.57–1)
EOSINOPHIL # BLD AUTO: 0.1 X10E3/UL (ref 0–0.4)
EOSINOPHIL NFR BLD AUTO: 1 %
ERYTHROCYTE [DISTWIDTH] IN BLOOD BY AUTOMATED COUNT: 12.6 % (ref 11.7–15.4)
ERYTHROCYTE [SEDIMENTATION RATE] IN BLOOD BY WESTERGREN METHOD: 5 MM/HR (ref 0–40)
GLOBULIN SER CALC-MCNC: 2 G/DL (ref 1.5–4.5)
GLUCOSE SERPL-MCNC: 100 MG/DL (ref 65–99)
HCT VFR BLD AUTO: 39.1 % (ref 34–46.6)
HGB BLD-MCNC: 12.9 G/DL (ref 11.1–15.9)
IMM GRANULOCYTES # BLD AUTO: 0 X10E3/UL (ref 0–0.1)
IMM GRANULOCYTES NFR BLD AUTO: 0 %
LYMPHOCYTES # BLD AUTO: 2 X10E3/UL (ref 0.7–3.1)
LYMPHOCYTES NFR BLD AUTO: 30 %
MCH RBC QN AUTO: 29.5 PG (ref 26.6–33)
MCHC RBC AUTO-ENTMCNC: 33 G/DL (ref 31.5–35.7)
MCV RBC AUTO: 90 FL (ref 79–97)
MONOCYTES # BLD AUTO: 0.6 X10E3/UL (ref 0.1–0.9)
MONOCYTES NFR BLD AUTO: 8 %
NEUTROPHILS # BLD AUTO: 4.1 X10E3/UL (ref 1.4–7)
NEUTROPHILS NFR BLD AUTO: 60 %
PLATELET # BLD AUTO: 223 X10E3/UL (ref 150–450)
POTASSIUM SERPL-SCNC: 4.2 MMOL/L (ref 3.5–5.2)
PROT SERPL-MCNC: 6.7 G/DL (ref 6–8.5)
RBC # BLD AUTO: 4.37 X10E6/UL (ref 3.77–5.28)
SODIUM SERPL-SCNC: 135 MMOL/L (ref 134–144)
TSH SERPL DL<=0.005 MIU/L-ACNC: 1.98 UIU/ML (ref 0.45–4.5)
VIT B12 SERPL-MCNC: 511 PG/ML (ref 232–1245)
WBC # BLD AUTO: 6.8 X10E3/UL (ref 3.4–10.8)

## 2020-10-02 NOTE — PROGRESS NOTES
HPI:  Deedra Schwab is a 78y.o. year old female who is here for a routine visit:    Presents for a follow-up visit. She has been doing well with regards to diverticulosis. She has had no recent flares and has not required any recent antibiotics. She has better after using antibiotics for UTI. She continues to have pain across the neck and shoulders. Some pain in the right shoulder girdle. Some lower back pain. She has noted burning pain in both of her feet. Some cramping in the muscles of the lower legs. She has had a couple falls that occurred when she lost her balance. Past Medical History:   Diagnosis Date    Arthritis     DDD BACK PAIN    Chest pain, atypical     Chronic pain     HIP    Diverticulitis of colon     Fibrocystic disease of breast     Hypercholesterolemia     Hypertension     IBS (irritable bowel syndrome)     Menopause 1968    Ovarian cyst        Past Surgical History:   Procedure Laterality Date    ENDOSCOPY, COLON, DIAGNOSTIC  10/22/2010    5 yr fu, Brand    HX APPENDECTOMY      HX COLONOSCOPY  10/20/2016    Dr. Karina Sharp - 5 yr f/u    HX DILATION AND CURETTAGE      HX TONSILLECTOMY         Prior to Admission medications    Medication Sig Start Date End Date Taking? Authorizing Provider   fluorouraciL (EFUDEX) 5 % chemo cream  9/30/20  Yes Provider, Historical   traMADoL (ULTRAM) 50 mg tablet Take 50 mg by mouth every six (6) hours as needed for Pain. Yes Provider, Historical   acetaminophen (Tylenol Extra Strength) 500 mg tablet Take  by mouth every six (6) hours as needed for Pain. Yes Provider, Historical   cimetidine (Tagamet HB) 200 mg tab Take 200 mg by mouth as needed. Yes Provider, Historical   calcium carb/vit D3/minerals (CALCIUM-VITAMIN D PO) Take  by mouth.    Yes Provider, Historical   zolpidem (AMBIEN) 5 mg tablet TAKE 1/2 TO 1 TABLET BY MOUTH AT BEDTIME AS NEEDED 10/1/20  Yes Myron Puckett III, MD   ciprofloxacin HCl (CIPRO) 500 mg tablet Take 1 Tab by mouth two (2) times a day. 10/1/20  Yes Tammi Fitzpatrick MD   bisoprolol-hydroCHLOROthiazide [facility]) 5-6.25 mg per tablet TAKE 1 TABLET BY MOUTH EVERY DAY 9/3/20  Yes Lea Marshall III, MD   [facility] blue-sod phos-phsal-hyo (URIBEL) 118-10-40.8-36 mg cap capsule Take 1 Cap by mouth four (4) times daily as needed for Pain. 1/15/20  Yes Tammi Fitzpatrick MD   diclofenac EC (VOLTAREN) 75 mg EC tablet TAKE 1 TAB BY MOUTH ONCE A DAY AS NEEDED. 10/9/19  Yes Tammi Fitzpatrick MD   B.infantis-B.ani-B.long-B.bifi (PROBIOTIC 4X) 10-15 mg TbEC Take  by mouth. Yes Provider, Historical   MULTIVITAMINS W-MINERALS/LUT (CENTRUM SILVER PO) Take  by mouth. Yes Provider, Historical   ciprofloxacin HCl (CIPRO) 500 mg tablet Take 1 Tab by mouth two (2) times a day. 6/8/20 10/1/20  Tammi Fitzpatrick MD   zolpidem (AMBIEN) 5 mg tablet TAKE 1/2 TO 1 TABLET BY MOUTH AT BEDTIME AS NEEDED 12/3/19 10/1/20  Lea Marshall III, MD   carisoprodol (SOMA) 250 mg tablet TAKE 1 TABLET BY MOUTH FOUR TIMES A DAY *PA REQUIRED, FAX SENT 6/21* 7/14/19   Lea Marshall III, MD   esomeprazole (NEXIUM) 20 mg capsule Take  by mouth daily. 10/1/20  Provider, Historical   olopatadine (PATANOL) 0.1 % ophthalmic solution Administer 2 Drops to both eyes two (2) times daily as needed. 10/1/20  Provider, Historical   cholecalciferol, vitamin d3, (VITAMIN D) 1,000 unit tablet Take 1,000 Units by mouth daily. 10/1/20  Provider, Historical   aspirin delayed-release 81 mg tablet Take 81 mg by mouth daily.   10/1/20  Provider, Historical       Social History     Socioeconomic History    Marital status:      Spouse name: Not on file    Number of children: Not on file    Years of education: Not on file    Highest education level: Not on file   Occupational History    Not on file   Social Needs    Financial resource strain: Not on file    Food insecurity     Worry: Not on file     Inability: Not on file   [employer] needs Medical: Not on file     Non-medical: Not on file   Tobacco Use    Smoking status: Never Smoker    Smokeless tobacco: Never Used   Substance and Sexual Activity    Alcohol use: Yes     Alcohol/week: 1.7 - 3.3 standard drinks     Types: 2 - 4 Standard drinks or equivalent per week    Drug use: No    Sexual activity: Never   Lifestyle    Physical activity     Days per week: Not on file     Minutes per session: Not on file    Stress: Not on file   Relationships    Social connections     Talks on phone: Not on file     Gets together: Not on file     Attends Evangelical service: Not on file     Active member of club or organization: Not on file     Attends meetings of clubs or organizations: Not on file     Relationship status: Not on file    Intimate partner violence     Fear of current or ex partner: Not on file     Emotionally abused: Not on file     Physically abused: Not on file     Forced sexual activity: Not on file   Other Topics Concern    Not on file   Social History Narrative    Not on file          ROS  Per HPI    Visit Vitals  BP (!) 144/80   Pulse 65   Temp 98 °F (36.7 °C) (Temporal)   Resp 12   Ht 5' 6\" (1.676 m)   Wt 172 lb 12.8 oz (78.4 kg)   SpO2 97%   BMI 27.89 kg/m²         Physical Exam   Physical Examination: General appearance - alert, well appearing, and in no distress  Chest - clear to auscultation, no wheezes, rales or rhonchi, symmetric air entry  Heart - normal rate and regular rhythm  Abdomen - soft, nontender, nondistended, no masses or organomegaly  Back exam -there is tenderness across the lower back as well as the upper back musculature.   Neurological - alert, oriented, normal speech, no focal findings or movement disorder noted, motor and sensory grossly normal bilaterally  Musculoskeletal - no joint tenderness, deformity or swelling  Extremities - peripheral pulses normal, no pedal edema, no clubbing or cyanosis      Assessment/Plan:  Diagnoses and all orders for this visit: 1. Neuropathylikely neuropathy. Will check labs for metabolic causes. If negative, will obtain nerve conduction velocity to evaluate for causes including the possibility of lumbar radiculopathy.  -     VITAMIN B12  -     TSH RFX ON ABNORMAL TO FREE T4    2. Primary insomniacontinue Ambien. Use minimally. -     zolpidem (AMBIEN) 5 mg tablet; TAKE 1/2 TO 1 TABLET BY MOUTH AT BEDTIME AS NEEDED    3. Diverticulitis of colon continue fiber supplements and use Cipro as needed. -     ciprofloxacin HCl (CIPRO) 500 mg tablet; Take 1 Tab by mouth two (2) times a day. 4. Muscle pain consider possibility of PMR. Will check lab work for that. -     VITAMIN C08  -     METABOLIC PANEL, COMPREHENSIVE  -     CBC WITH AUTOMATED DIFF  -     SED RATE (ESR)  -     CK    5. Pain in both feet - per above. -     VITAMIN B12    6. Vitamin D deficiencycheck levels to be sure this is adequately repleted. -     VITAMIN D, 25 HYDROXY              Advised her to call back or return to office if symptoms worsen/change/persist.  Discussed expected course/resolution/complications of diagnosis in detail with patient. Medication risks/benefits/costs/interactions/alternatives discussed with patient. She was given an after visit summary which includes diagnoses, current medications, & vitals. She expressed understanding with the diagnosis and plan.

## 2020-10-14 ENCOUNTER — TELEPHONE (OUTPATIENT)
Dept: INTERNAL MEDICINE CLINIC | Age: 80
End: 2020-10-14

## 2020-10-14 NOTE — TELEPHONE ENCOUNTER
Spoke with patient and advised order faxed for NCV testing - advised she will get a call from them to schedule. Pt verbalized understanding.

## 2020-10-14 NOTE — TELEPHONE ENCOUNTER
Bjorn Brown ([facility]) [phone] (H)     Pt says that she received a note with her lab results saying that she needs to a ncv done. She wants to know [place] she [place] Also will she need an order?

## 2020-10-15 DIAGNOSIS — M54.40 LUMBAGO WITH SCIATICA, UNSPECIFIED SIDE: ICD-10-CM

## 2020-10-15 RX ORDER — TRAMADOL HYDROCHLORIDE 50 MG/1
TABLET ORAL
Qty: 30 TAB | Refills: 0 | Status: SHIPPED | OUTPATIENT
Start: 2020-10-15 | End: 2021-01-07

## 2020-10-21 ENCOUNTER — TELEPHONE (OUTPATIENT)
Dept: INTERNAL MEDICINE CLINIC | Age: 80
End: 2020-10-21

## 2020-10-21 DIAGNOSIS — E78.2 MIXED HYPERLIPIDEMIA: ICD-10-CM

## 2020-10-21 DIAGNOSIS — K57.32 DIVERTICULITIS OF COLON: ICD-10-CM

## 2020-10-21 DIAGNOSIS — R42 DIZZINESS: ICD-10-CM

## 2020-10-21 DIAGNOSIS — R51.9 HEADACHE, UNSPECIFIED HEADACHE TYPE: ICD-10-CM

## 2020-10-21 DIAGNOSIS — I10 ESSENTIAL HYPERTENSION: ICD-10-CM

## 2020-10-21 DIAGNOSIS — M54.9 BACK PAIN, UNSPECIFIED BACK LOCATION, UNSPECIFIED BACK PAIN LATERALITY, UNSPECIFIED CHRONICITY: ICD-10-CM

## 2020-10-21 RX ORDER — DICLOFENAC SODIUM 75 MG/1
TABLET, DELAYED RELEASE ORAL
Qty: 90 TAB | Refills: 5 | Status: SHIPPED | OUTPATIENT
Start: 2020-10-21 | End: 2021-06-24 | Stop reason: ALTCHOICE

## 2020-10-21 NOTE — TELEPHONE ENCOUNTER
[phone]      The patient still has not heard from any one regarding  Her nerve test. Please call to discuss

## 2020-10-21 NOTE — TELEPHONE ENCOUNTER
Spoke with patient and gave # to call to schedule - info was faxed over on 10/14/2020 - advised will fax again as well. Confirmation received.

## 2020-10-22 ENCOUNTER — TELEPHONE (OUTPATIENT)
Dept: INTERNAL MEDICINE CLINIC | Age: 80
End: 2020-10-22

## 2020-10-22 NOTE — TELEPHONE ENCOUNTER
[phone]    Please call regarding the test She has not heard anything regarding the test and she said she does not know where the order is

## 2020-10-22 NOTE — TELEPHONE ENCOUNTER
Attempted to contact patient, unsuccessful.    -Left message advising that I am waiting for callback from Dr. Rivera Escort office for info about her appt status.

## 2020-10-22 NOTE — TELEPHONE ENCOUNTER
Attempted to contact Dr. Gini Rudd office @ [facility], unsuccessful.    -Left message to return call to discuss status of patients appt/consult for NCV testing.

## 2020-10-23 ENCOUNTER — TELEPHONE (OUTPATIENT)
Dept: INTERNAL MEDICINE CLINIC | Age: 80
End: 2020-10-23

## 2020-10-23 NOTE — TELEPHONE ENCOUNTER
Unable to get in touch with anyone at Dr. Perry Ocampo office to discuss EMG testing - sent new order to [address] [place] [address] signed by Dr. Surjit Guido.

## 2020-10-23 NOTE — TELEPHONE ENCOUNTER
Per pt she does not want to use [facility] b/c they told her [place] - we have resent orders to [address] [place] [address].

## 2020-10-23 NOTE — TELEPHONE ENCOUNTER
Spoke with patient and info provided for [facility], pt appreciated return call. She will contact them on Monday if they do not call her today to schedule.

## 2020-11-13 ENCOUNTER — TRANSCRIBE ORDER (OUTPATIENT)
Dept: SCHEDULING | Age: 80
End: 2020-11-13

## 2020-11-13 DIAGNOSIS — M17.12 DEGENERATIVE ARTHRITIS OF LEFT KNEE: Primary | ICD-10-CM

## 2020-11-16 ENCOUNTER — HOSPITAL ENCOUNTER (OUTPATIENT)
Dept: GENERAL RADIOLOGY | Age: 80
Discharge: HOME | End: 2020-11-16
Attending: ORTHOPAEDIC SURGERY
Payer: MEDICARE

## 2020-11-16 ENCOUNTER — TRANSCRIBE ORDER (OUTPATIENT)
Dept: SCHEDULING | Age: 80
End: 2020-11-16

## 2020-11-16 DIAGNOSIS — M25.552 LEFT HIP PAIN: ICD-10-CM

## 2020-11-16 DIAGNOSIS — M17.12 DEGENERATIVE ARTHRITIS OF LEFT KNEE: ICD-10-CM

## 2020-11-16 DIAGNOSIS — M25.552 LEFT HIP PAIN: Primary | ICD-10-CM

## 2020-11-16 PROCEDURE — 20610 DRAIN/INJ JOINT/BURSA W/O US: CPT

## 2020-11-16 PROCEDURE — 74011000636 HC RX REV CODE- 636: Performed by: RADIOLOGY

## 2020-11-16 PROCEDURE — 74011250636 HC RX REV CODE- 250/636: Performed by: RADIOLOGY

## 2020-11-16 PROCEDURE — 74011000250 HC RX REV CODE- 250: Performed by: RADIOLOGY

## 2020-11-16 RX ORDER — LIDOCAINE HYDROCHLORIDE 10 MG/ML
5 INJECTION, SOLUTION EPIDURAL; INFILTRATION; INTRACAUDAL; PERINEURAL
Status: COMPLETED | OUTPATIENT
Start: 2020-11-16 | End: 2020-11-16

## 2020-11-16 RX ORDER — BUPIVACAINE HYDROCHLORIDE 5 MG/ML
5 INJECTION, SOLUTION EPIDURAL; INTRACAUDAL
Status: COMPLETED | OUTPATIENT
Start: 2020-11-16 | End: 2020-11-16

## 2020-11-16 RX ORDER — TRIAMCINOLONE ACETONIDE 40 MG/ML
40 INJECTION, SUSPENSION INTRA-ARTICULAR; INTRAMUSCULAR
Status: COMPLETED | OUTPATIENT
Start: 2020-11-16 | End: 2020-11-16

## 2020-11-16 RX ORDER — SODIUM BICARBONATE 42 MG/ML
2 INJECTION, SOLUTION INTRAVENOUS
Status: COMPLETED | OUTPATIENT
Start: 2020-11-16 | End: 2020-11-16

## 2020-11-16 RX ADMIN — TRIAMCINOLONE ACETONIDE 40 MG: 40 INJECTION, SUSPENSION INTRA-ARTICULAR; INTRAMUSCULAR at 10:01

## 2020-11-16 RX ADMIN — SODIUM BICARBONATE 84 MG: 42 INJECTION, SOLUTION INTRAVENOUS at 10:00

## 2020-11-16 RX ADMIN — BUPIVACAINE HYDROCHLORIDE 25 MG: 5 INJECTION, SOLUTION EPIDURAL; INTRACAUDAL; PERINEURAL at 10:00

## 2020-11-16 RX ADMIN — LIDOCAINE HYDROCHLORIDE 5 ML: 10 INJECTION, SOLUTION EPIDURAL; INFILTRATION; INTRACAUDAL; PERINEURAL at 10:00

## 2020-11-16 RX ADMIN — IOHEXOL 10 ML: 180 INJECTION INTRAVENOUS at 10:00

## 2020-11-20 DIAGNOSIS — M17.12 DEGENERATIVE ARTHRITIS OF LEFT KNEE: ICD-10-CM

## 2020-12-16 ENCOUNTER — OFFICE VISIT (OUTPATIENT)
Dept: INTERNAL MEDICINE CLINIC | Age: 80
End: 2020-12-16
Payer: MEDICARE

## 2020-12-16 VITALS
DIASTOLIC BLOOD PRESSURE: 80 MMHG | SYSTOLIC BLOOD PRESSURE: 156 MMHG | WEIGHT: 172.6 LBS | RESPIRATION RATE: 16 BRPM | TEMPERATURE: 97.3 F | HEIGHT: 66 IN | OXYGEN SATURATION: 99 % | HEART RATE: 71 BPM | BODY MASS INDEX: 27.74 KG/M2

## 2020-12-16 DIAGNOSIS — E78.2 MIXED HYPERLIPIDEMIA: ICD-10-CM

## 2020-12-16 DIAGNOSIS — G62.9 NEUROPATHY: Primary | ICD-10-CM

## 2020-12-16 DIAGNOSIS — R73.01 FASTING HYPERGLYCEMIA: ICD-10-CM

## 2020-12-16 DIAGNOSIS — I10 ESSENTIAL HYPERTENSION: ICD-10-CM

## 2020-12-16 PROCEDURE — 1090F PRES/ABSN URINE INCON ASSESS: CPT | Performed by: INTERNAL MEDICINE

## 2020-12-16 PROCEDURE — G8427 DOCREV CUR MEDS BY ELIG CLIN: HCPCS | Performed by: INTERNAL MEDICINE

## 2020-12-16 PROCEDURE — G8432 DEP SCR NOT DOC, RNG: HCPCS | Performed by: INTERNAL MEDICINE

## 2020-12-16 PROCEDURE — G8753 SYS BP > OR = 140: HCPCS | Performed by: INTERNAL MEDICINE

## 2020-12-16 PROCEDURE — 3288F FALL RISK ASSESSMENT DOCD: CPT | Performed by: INTERNAL MEDICINE

## 2020-12-16 PROCEDURE — G8419 CALC BMI OUT NRM PARAM NOF/U: HCPCS | Performed by: INTERNAL MEDICINE

## 2020-12-16 PROCEDURE — G8399 PT W/DXA RESULTS DOCUMENT: HCPCS | Performed by: INTERNAL MEDICINE

## 2020-12-16 PROCEDURE — G8754 DIAS BP LESS 90: HCPCS | Performed by: INTERNAL MEDICINE

## 2020-12-16 PROCEDURE — 1100F PTFALLS ASSESS-DOCD GE2>/YR: CPT | Performed by: INTERNAL MEDICINE

## 2020-12-16 PROCEDURE — G8536 NO DOC ELDER MAL SCRN: HCPCS | Performed by: INTERNAL MEDICINE

## 2020-12-16 PROCEDURE — 99214 OFFICE O/P EST MOD 30 MIN: CPT | Performed by: INTERNAL MEDICINE

## 2020-12-16 PROCEDURE — G0463 HOSPITAL OUTPT CLINIC VISIT: HCPCS | Performed by: INTERNAL MEDICINE

## 2020-12-16 RX ORDER — DULOXETIN HYDROCHLORIDE 20 MG/1
20 CAPSULE, DELAYED RELEASE ORAL DAILY
Qty: 30 CAP | Refills: 0 | Status: SHIPPED | OUTPATIENT
Start: 2020-12-16 | End: 2021-01-07

## 2020-12-16 NOTE — PROGRESS NOTES
HPI:  Mack Mosquera is a 78y.o. year old female who is here for a routine visit:    Presents for a follow-up visit. She recently had nerve conduction velocity done of her legs revealing peripheral neuropathy. There was no evidence of lumbar radiculopathy. She continues to have pain from her knees down. She also has some pain across her lower back and in her left hip. She recently had a left hip injection that is made very little difference. She does have an orthopedics follow-up. Previous laboratories were revealed normal TSH, B12, and a blood sugar of 100. Other routine lab work was normal.      Past Medical History:   Diagnosis Date    Arthritis     DDD BACK PAIN    Chest pain, atypical     Chronic pain     HIP    Diverticulitis of colon     Fibrocystic disease of breast     Hypercholesterolemia     Hypertension     IBS (irritable bowel syndrome)     Menopause 1968    Ovarian cyst        Past Surgical History:   Procedure Laterality Date    ENDOSCOPY, COLON, DIAGNOSTIC  10/22/2010    5 yr fu, Brand    HX APPENDECTOMY      HX COLONOSCOPY  10/20/2016    Dr. Jacinta Malcolm - 5 yr f/u    HX DILATION AND CURETTAGE      HX TONSILLECTOMY         Prior to Admission medications    Medication Sig Start Date End Date Taking? Authorizing Provider   DULoxetine (CYMBALTA) 20 mg capsule Take 1 Cap by mouth daily. 12/16/20  Yes Oleksandr Lara MD   diclofenac EC (VOLTAREN) 75 mg EC tablet TAKE 1 TAB BY MOUTH ONCE A DAY AS NEEDED. 10/21/20  Yes Oleksandr Lara MD   acetaminophen (Tylenol Extra Strength) 500 mg tablet Take  by mouth every six (6) hours as needed for Pain. Yes Provider, Historical   cimetidine (Tagamet HB) 200 mg tab Take 200 mg by mouth as needed. Yes Provider, Historical   calcium carb/vit D3/minerals (CALCIUM-VITAMIN D PO) Take  by mouth.    Yes Provider, Historical   zolpidem (AMBIEN) 5 mg tablet TAKE 1/2 TO 1 TABLET BY MOUTH AT BEDTIME AS NEEDED 10/1/20  Yes Oleksandr Lara MD bisoprolol-hydroCHLOROthiazide (ZIAC) 5-6.25 mg per tablet TAKE 1 TABLET BY MOUTH EVERY DAY 9/3/20  Yes Diallo Mcneill MD   [facility] blue-sod phos-phsal-hyo (URIBEL) 979-57-17.2-07 mg cap capsule Take 1 Cap by mouth four (4) times daily as needed for Pain. 1/15/20  Yes Diallo Mcneill MD   B.infantis-B.ani-B.long-B.bifi (PROBIOTIC 4X) 10-15 mg TbEC Take  by mouth. Yes Provider, Historical   MULTIVITAMINS W-MINERALS/LUT (CENTRUM SILVER PO) Take  by mouth. Yes Provider, Historical   fluorouraciL (EFUDEX) 5 % chemo cream  9/30/20   Provider, Historical   ciprofloxacin HCl (CIPRO) 500 mg tablet Take 1 Tab by mouth two (2) times a day. 10/1/20 12/16/20  Diallo Mcneill MD   carisoprodol (SOMA) 250 mg tablet TAKE 1 TABLET BY MOUTH FOUR TIMES A DAY *PA REQUIRED, FAX SENT 6/21* 7/14/19   Diallo Mcneill MD       Social History     Socioeconomic History    Marital status:      Spouse name: Not on file    Number of children: Not on file    Years of education: Not on file    Highest education level: Not on file   Occupational History    Not on file   Social Needs    Financial resource strain: Not on file    Food insecurity     Worry: Not on file     Inability: Not on file    Transportation needs     Medical: Not on file     Non-medical: Not on file   Tobacco Use    Smoking status: Never Smoker    Smokeless tobacco: Never Used   Substance and Sexual Activity    Alcohol use:  Yes     Alcohol/week: 1.7 - 3.3 standard drinks     Types: 2 - 4 Standard drinks or equivalent per week    Drug use: No    Sexual activity: Never   Lifestyle    Physical activity     Days per week: Not on file     Minutes per session: Not on file    Stress: Not on file   Relationships    Social connections     Talks on phone: Not on file     Gets together: Not on file     Attends Adventism service: Not on file     Active member of club or organization: Not on file     Attends meetings of clubs or organizations: Not on file     Relationship status: Not on file    Intimate partner violence     Fear of current or ex partner: Not on file     Emotionally abused: Not on file     Physically abused: Not on file     Forced sexual activity: Not on file   Other Topics Concern    Not on file   Social History Narrative    Not on file          ROS  Per HPI    Visit Vitals  BP (!) 156/80   Pulse 71   Temp 97.3 °F (36.3 °C) (Temporal)   Resp 16   Ht 5' 6\" (1.676 m)   Wt 172 lb 9.6 oz (78.3 kg)   SpO2 99%   BMI 27.86 kg/m²         Physical Exam   Physical Examination: General appearance - alert, well appearing, and in no distress  Chest - clear to auscultation, no wheezes, rales or rhonchi, symmetric air entry  Heart - normal rate and regular rhythm  Neurological - motor and sensory grossly normal bilaterally  Extremities - peripheral pulses normal, no pedal edema, no clubbing or cyanosis      Assessment/Plan:  Diagnoses and all orders for this visit:    1. Neuropathyunclear etiology. ?  Related to glucose intolerance. At this point will check lab work to rule out other causes. If all are negative, consider treatment. We discussed the option of gabapentin, Lyrica, and Cymbalta today. She wishes to continue using topical lidocaine for now as it is helping. If it is unsuccessful, she will try low-dose Cymbalta and see if that helps. -     HEMOGLOBIN A1C WITH EAG; Future  -     PROTEIN ELECTROPHORESIS; Future  -     T4, FREE; Future  -     JOSE BY MULTIPLEX FLOW IA, QL; Future  -     SED RATE (ESR); Future  -     DULoxetine (CYMBALTA) 20 mg capsule; Take 1 Cap by mouth daily. 2. Fasting hyperglycemiarepeat A1c today.  -     HEMOGLOBIN A1C WITH EAG; Future  -     T4, FREE; Future    3. Essential hypertensionblood pressure slightly elevated today. Repeat levels over the next few months. -     T4, FREE; Future    4. Mixed hyperlipidemia  -     T4, FREE;  Future              Advised her to call back or return to office if symptoms worsen/change/persist.  Discussed expected course/resolution/complications of diagnosis in detail with patient. Medication risks/benefits/costs/interactions/alternatives discussed with patient. She was given an after visit summary which includes diagnoses, current medications, & vitals. She expressed understanding with the diagnosis and plan.

## 2020-12-17 DIAGNOSIS — I10 ESSENTIAL HYPERTENSION: ICD-10-CM

## 2020-12-17 LAB
ALBUMIN SERPL ELPH-MCNC: 3.8 G/DL (ref 2.9–4.4)
ALBUMIN/GLOB SERPL: 1.6 {RATIO} (ref 0.7–1.7)
ALPHA1 GLOB SERPL ELPH-MCNC: 0.2 G/DL (ref 0–0.4)
ALPHA2 GLOB SERPL ELPH-MCNC: 0.8 G/DL (ref 0.4–1)
ANA SER QL: NEGATIVE
B-GLOBULIN SERPL ELPH-MCNC: 1 G/DL (ref 0.7–1.3)
ERYTHROCYTE [SEDIMENTATION RATE] IN BLOOD: 6 MM/HR (ref 0–30)
EST. AVERAGE GLUCOSE BLD GHB EST-MCNC: 114 MG/DL
GAMMA GLOB SERPL ELPH-MCNC: 0.5 G/DL (ref 0.4–1.8)
GLOBULIN SER CALC-MCNC: 2.4 G/DL (ref 2.2–3.9)
HBA1C MFR BLD: 5.6 % (ref 4–5.6)
M PROTEIN SERPL ELPH-MCNC: NORMAL G/DL
PROT SERPL-MCNC: 6.2 G/DL (ref 6–8.5)
T4 FREE SERPL-MCNC: 1.1 NG/DL (ref 0.8–1.5)

## 2020-12-17 RX ORDER — BISOPROLOL FUMARATE AND HYDROCHLOROTHIAZIDE 5; 6.25 MG/1; MG/1
TABLET ORAL
Qty: 90 TAB | Refills: 0 | Status: SHIPPED | OUTPATIENT
Start: 2020-12-17 | End: 2021-04-07

## 2020-12-18 ENCOUNTER — TELEPHONE (OUTPATIENT)
Dept: INTERNAL MEDICINE CLINIC | Age: 80
End: 2020-12-18

## 2020-12-18 DIAGNOSIS — G62.9 NEUROPATHY: Primary | ICD-10-CM

## 2020-12-18 NOTE — TELEPHONE ENCOUNTER
----- Message from Brijesh Santana MD sent at 12/17/2020  9:09 PM EST -----  Notify all OK - should see neurology if symptoms persist.

## 2020-12-18 NOTE — TELEPHONE ENCOUNTER
Spoke with patient and informed labs were okay per Dr. Iveth Renee and to f/u w/ neurology if s/sx worsen or fail to improve - info provided for [id number] [facility] - pt verbalized understanding. Orders Placed This Encounter    REFERRAL TO NEUROLOGY     Referral Priority:   Routine     Referral Type:   Consultation     Referral Reason:   Specialty Services Required     Number of Visits Requested:   1     The above orders were approved via [facility] per Dr. Marce Chavez, III.

## 2021-01-05 ENCOUNTER — TRANSCRIBE ORDER (OUTPATIENT)
Dept: SCHEDULING | Age: 81
End: 2021-01-05

## 2021-01-05 DIAGNOSIS — Z12.31 ENCOUNTER FOR SCREENING MAMMOGRAM FOR MALIGNANT NEOPLASM OF BREAST: Primary | ICD-10-CM

## 2021-01-07 DIAGNOSIS — G62.9 NEUROPATHY: ICD-10-CM

## 2021-01-07 DIAGNOSIS — M54.40 LUMBAGO WITH SCIATICA, UNSPECIFIED SIDE: ICD-10-CM

## 2021-01-07 RX ORDER — TRAMADOL HYDROCHLORIDE 50 MG/1
TABLET ORAL
Qty: 30 TAB | Refills: 0 | Status: SHIPPED | OUTPATIENT
Start: 2021-01-07 | End: 2021-04-30

## 2021-01-07 RX ORDER — DULOXETIN HYDROCHLORIDE 20 MG/1
CAPSULE, DELAYED RELEASE ORAL
Qty: 30 CAP | Refills: 0 | Status: SHIPPED | OUTPATIENT
Start: 2021-01-07 | End: 2021-02-04 | Stop reason: SDUPTHER

## 2021-01-14 ENCOUNTER — TELEPHONE (OUTPATIENT)
Dept: INTERNAL MEDICINE CLINIC | Age: 81
End: 2021-01-14

## 2021-01-19 ENCOUNTER — OFFICE VISIT (OUTPATIENT)
Dept: NEUROLOGY | Age: 81
End: 2021-01-19
Payer: MEDICARE

## 2021-01-19 VITALS
SYSTOLIC BLOOD PRESSURE: 132 MMHG | DIASTOLIC BLOOD PRESSURE: 76 MMHG | HEART RATE: 74 BPM | OXYGEN SATURATION: 98 % | RESPIRATION RATE: 18 BRPM

## 2021-01-19 DIAGNOSIS — M25.50 ARTHRALGIA, UNSPECIFIED JOINT: ICD-10-CM

## 2021-01-19 DIAGNOSIS — G60.9 IDIOPATHIC POLYNEUROPATHY: Primary | ICD-10-CM

## 2021-01-19 PROCEDURE — 1100F PTFALLS ASSESS-DOCD GE2>/YR: CPT | Performed by: PSYCHIATRY & NEUROLOGY

## 2021-01-19 PROCEDURE — G8536 NO DOC ELDER MAL SCRN: HCPCS | Performed by: PSYCHIATRY & NEUROLOGY

## 2021-01-19 PROCEDURE — 3288F FALL RISK ASSESSMENT DOCD: CPT | Performed by: PSYCHIATRY & NEUROLOGY

## 2021-01-19 PROCEDURE — G8419 CALC BMI OUT NRM PARAM NOF/U: HCPCS | Performed by: PSYCHIATRY & NEUROLOGY

## 2021-01-19 PROCEDURE — G8399 PT W/DXA RESULTS DOCUMENT: HCPCS | Performed by: PSYCHIATRY & NEUROLOGY

## 2021-01-19 PROCEDURE — G8752 SYS BP LESS 140: HCPCS | Performed by: PSYCHIATRY & NEUROLOGY

## 2021-01-19 PROCEDURE — 99203 OFFICE O/P NEW LOW 30 MIN: CPT | Performed by: PSYCHIATRY & NEUROLOGY

## 2021-01-19 PROCEDURE — G8754 DIAS BP LESS 90: HCPCS | Performed by: PSYCHIATRY & NEUROLOGY

## 2021-01-19 PROCEDURE — 1090F PRES/ABSN URINE INCON ASSESS: CPT | Performed by: PSYCHIATRY & NEUROLOGY

## 2021-01-19 PROCEDURE — G8427 DOCREV CUR MEDS BY ELIG CLIN: HCPCS | Performed by: PSYCHIATRY & NEUROLOGY

## 2021-01-19 PROCEDURE — G8432 DEP SCR NOT DOC, RNG: HCPCS | Performed by: PSYCHIATRY & NEUROLOGY

## 2021-01-19 NOTE — PROGRESS NOTES
NEUROLOGY  NEW PATIENT EVALUATION/CONSULTATION       PATIENT NAME: Orestes Martin    MRN: [id number]    REASON FOR CONSULTATION: Numbness into the feet    01/19/21      Previous records (physician notes, laboratory reports, and radiology reports) and imaging studies were reviewed and summarized. My recommendations will be communicated back to the patient's physician(s) via electronic medical record and/or by [facility] mail. HISTORY OF PRESENT ILLNESS:  Orestes Martin is a 78 y.o. right handed female presenting for evaluation of foot numbness, imbalance. Patient reports onset of symptoms over the past 3-4 years with progression since onset. Symptoms are described as numbness/tingling into the toes and plantar surface of both feet not extending above the ankles. Symptoms are worse in the mornings. She exhibits some aching into both legs from her ankles to her knees with associated arthralgia. She reports imbalance and occasional falls. She denies paresthesias of the upper extremities or significant extremity weakness. She endorses chronic LBP without radicular symptoms, unchanged for several years. She is taking Cymbalta x 4 weeks with some relief in regards to above symptoms. Recent EMG 12/2020 performed by [employer] confirmed a generalized polyneuropathy sensory greater than motor.         PAST MEDICAL HISTORY:  Past Medical History:   Diagnosis Date    Arthritis     DDD BACK PAIN    Chest pain, atypical     Chronic pain     HIP    Diverticulitis of colon     Fibrocystic disease of breast     Hypercholesterolemia     Hypertension     IBS (irritable bowel syndrome)     Menopause 1968    Ovarian cyst        PAST SURGICAL HISTORY:  Past Surgical History:   Procedure Laterality Date    ENDOSCOPY, COLON, DIAGNOSTIC  10/22/2010    5 yr fu, [facility]    HX APPENDECTOMY      HX COLONOSCOPY  10/20/2016    Dr. Lona Wood - 5 yr f/u    HX DILATION AND CURETTAGE      HX TONSILLECTOMY         FAMILY HISTORY:   Family History   Problem Relation Age of Onset   [facility] Arthritis-rheumatoid Mother     Heart Disease Father     Hypertension Sister     Cancer Maternal Uncle     Cancer Maternal Grandfather     Other Sister         IBS         SOCIAL HISTORY:  Social History     Socioeconomic History    Marital status:      Spouse name: Not on file    Number of children: Not on file    Years of education: Not on file    Highest education level: Not on file   Tobacco Use    Smoking status: Never Smoker    Smokeless tobacco: Never Used   Substance and Sexual Activity    Alcohol use: Yes     Alcohol/week: 1.7 - 3.3 standard drinks     Types: 2 - 4 Standard drinks or equivalent per week    Drug use: No    Sexual activity: Never         MEDICATIONS:   Current Outpatient Medications   Medication Sig Dispense Refill    DULoxetine (CYMBALTA) 20 mg capsule TAKE 1 CAPSULE BY MOUTH EVERY DAY 30 Cap 0    traMADoL (ULTRAM) 50 mg tablet TAKE 1 TABLET BY MOUTH EVERY 6 HOURS AS NEEDED 30 Tab 0    bisoprolol-hydroCHLOROthiazide (ZIAC) 5-6.25 mg per tablet TAKE 1 TABLET BY MOUTH EVERY DAY 90 Tab 0    diclofenac EC (VOLTAREN) 75 mg EC tablet TAKE 1 TAB BY MOUTH ONCE A DAY AS NEEDED. 90 Tab 5    acetaminophen (Tylenol Extra Strength) 500 mg tablet Take  by mouth every six (6) hours as needed for Pain.  cimetidine (Tagamet HB) 200 mg tab Take 200 mg by mouth as needed.  calcium carb/vit D3/minerals (CALCIUM-VITAMIN D PO) Take  by mouth.  zolpidem (AMBIEN) 5 mg tablet TAKE 1/2 TO 1 TABLET BY MOUTH AT BEDTIME AS NEEDED 30 Tab 1    mth-me blue-sod phos-phsal-hyo (URIBEL) 118-10-40.8-36 mg cap capsule Take 1 Cap by mouth four (4) times daily as needed for Pain. 60 Cap 3    B.infantis-B.ani-B.long-B.bifi (PROBIOTIC 4X) 10-15 mg TbEC Take  by mouth.            ALLERGIES:  Allergies   Allergen Reactions    Pcn [Penicillins] Swelling     Arm with injection    Sonata [Zaleplon] Itching         REVIEW OF SYSTEMS:  10 point ROS reviewed with patient. Please see scanned document under media. PHYSICAL EXAM:  Vital Signs:   Visit Vitals  /76   Pulse 74   Resp 18   SpO2 98%        General Medical Exam:  General:  Well appearing, comfortable, in no apparent distress. Eyes/ENT: see cranial nerve examination. Neck: No masses appreciated. Full range of motion without tenderness. Respiratory:  Clear to auscultation, good air entry bilaterally. Cardiac:  Regular rate and rhythm, no murmur. GI:  Soft, non-tender, non-distended abdomen. Bowel sounds normal. No masses, organomegaly. Extremities:  No deformities, edema, or skin discoloration. Skin:  No rashes or lesions. Neurological:  · Mental Status:  Alert and oriented to person, place, and time with fluent speech. · Cranial Nerves:   CNII/III/IV/VI: visual fields full to confrontation, EOMI, PERRL, no ptosis or nystagmus. CN V: Facial sensation intact bilaterally, masseter 5/5   CN VII: Facial muscles symmetric and strong   CN VIII: Hears finger rub well bilaterally, intact vestibular function   CN IX/X: Normal palatal movement   CN XI: Full strength shoulder shrug bilaterally   CN XII: Tongue protrusion full and midline without fasciculation or atrophy  · Motor: Normal tone and muscle bulk with no pronator drift.    Individual muscle group testing:  Shoulder abduction:   Left:5/5   Right : 5/5    Shoulder adduction:   Left:5/5   Right : 5/5    Elbow flexion:      Left:5/5   Right : 5/5  Elbow extension:    Left:5/5   Right : 5/5   Wrist flexion:    Left:5/5   Right : 5/5  Wrist extension:    Left:5/5   Right : 5/5  Arm pronation:   Left:5/5   Right : 5/5  Arm supination:   Left:5/5   Right : 5/5    Finger flexion:    Left:5/5   Right : 5/5    Finger extension:   Left:5/5   Right : 5/5   Finger abduction:  Left:5/5   Right : 5/5   Finger adduction:   Left:5/5   Right : 5/5  Hip flexion:     Left:5/5   Right : 5/5         Hip extension:   Left:5/5   Right : 5/5    Knee flexion:    Left:5/5   Right : 5/5    Knee extension:   Left:5/5   Right : 5/5    Dorsiflexion:     Left:5/5   Right : 5/5  Plantar flexion:    Left:5/5   Right : 5/5      · MSRs: No crossed adductors or clonus. RIGHT  LEFT   Brachioradialis 1+ 1+   Biceps 1+ 1+   Triceps 1+ 1+   Knee 1+ 1+   Achilles 0 0        Plantar response Downward Downward          · Sensation: Decreased vibration/pinprick/temp distal RLE>LLE to ankles. Proprioception/LT intact; (-) Romberg. · Coordination: No dysmetria. Finger-to-nose and heel-to- shin testing are within normal limits. Intermittent tremor RUE variable amplitude/frequency, no rigidity, mild bradykinesia b/l finger taps. · Gait: Slightly cautious, steady, difficulty with tandem. PERTINENT DATA:  OUTSIDE RECORDS:  The patient provided outside medical records which were reviewed during the course of the visit. The relevant detail are summarized above. INTERNAL RECORDS:  The patient's electronic medical record was reviewed. The relevant details include:   Component      Latest Ref Rng & Units 12/16/2020 12/16/2020 12/16/2020 12/16/2020           4:04 PM  4:04 PM  4:04 PM  4:04 PM   Glucose      65 - 99 mg/dL       BUN      8 - 27 mg/dL       Creatinine      0.57 - 1.00 mg/dL       GFR est non-AA      >59 mL/min/1.73       GFR est AA      >59 mL/min/1.73       BUN/Creatinine ratio      12 - 28       Sodium      134 - 144 mmol/L       Potassium      3.5 - 5.2 mmol/L       Chloride      96 - 106 mmol/L       CO2      20 - 29 mmol/L       Calcium      8.7 - 10.3 mg/dL       Protein, total      6.0 - 8.5 g/dL  6.2     Albumin      3.6 - 4.6 g/dL       GLOBULIN, TOTAL      1.5 - 4.5 g/dL       A-G Ratio      1.2 - 2.2       Bilirubin, total      0.0 - 1.2 mg/dL       Alk.  phosphatase      39 - 117 IU/L       AST      0 - 40 IU/L       ALT      0 - 32 IU/L       Albumin      2.9 - 4.4 g/dL  3.8     Alpha-1-globulin      0.0 - 0.4 g/dL  0.2     ALPHA-2 GLOBULIN 0.4 - 1.0 g/dL  0.8     Beta globulin      0.7 - 1.3 g/dL  1.0     Gamma globulin      0.4 - 1.8 g/dL  0.5     M-Aneduy      Not Observed g/dL  Not Observed     Globulin, total      2.2 - 3.9 g/dL  2.4     A/G ratio      0.7 - 1.7    1.6     Hemoglobin A1c, (calculated)      4.0 - 5.6 % 5.6      Est. average glucose      mg/dL 114      Vitamin B12      232 - 1,245 pg/mL       TSH      0.450 - 4.500 uIU/mL       Sed rate, automated      0 - 30 mm/hr    6   JOSE, Direct      Negative         T4, Free      0.8 - 1.5 NG/DL   1.1      Component      Latest Ref Rng & Units 12/16/2020 10/1/2020 10/1/2020 10/1/2020           4:04 PM  2:05 PM  2:05 PM  2:05 PM   Glucose      65 - 99 mg/dL   100 (H)    BUN      8 - 27 mg/dL   12    Creatinine      0.57 - 1.00 mg/dL   0.64    GFR est non-AA      >59 mL/min/1.73   83    GFR est AA      >59 mL/min/1.73   96    BUN/Creatinine ratio      12 - 28   19    Sodium      134 - 144 mmol/L   135    Potassium      3.5 - 5.2 mmol/L   4.2    Chloride      96 - 106 mmol/L   97    CO2      20 - 29 mmol/L   26    Calcium      8.7 - 10.3 mg/dL   10.2    Protein, total      6.0 - 8.5 g/dL   6.7    Albumin      3.6 - 4.6 g/dL   4.7 (H)    GLOBULIN, TOTAL      1.5 - 4.5 g/dL   2.0    A-G Ratio      1.2 - 2.2   2.4 (H)    Bilirubin, total      0.0 - 1.2 mg/dL   0.3    Alk.  phosphatase      39 - 117 IU/L   64    AST      0 - 40 IU/L   17    ALT      0 - 32 IU/L   17    Albumin      2.9 - 4.4 g/dL       Alpha-1-globulin      0.0 - 0.4 g/dL       ALPHA-2 GLOBULIN      0.4 - 1.0 g/dL       Beta globulin      0.7 - 1.3 g/dL       Gamma globulin      0.4 - 1.8 g/dL       M-Aneudy      Not Observed g/dL       Globulin, total      2.2 - 3.9 g/dL       A/G ratio      0.7 - 1.7         Hemoglobin A1c, (calculated)      4.0 - 5.6 %       Est. average glucose      mg/dL       Vitamin B12      232 - 1,245 pg/mL    511   TSH      0.450 - 4.500 uIU/mL  1.980     Sed rate, automated      0 - 30 mm/hr       JOSE, Direct      Negative   Negative      T4, Free      0.8 - 1.5 NG/DL             ASSESSMENT:      ICD-10-CM ICD-9-CM    1. Idiopathic polyneuropathy  G60.9 356.9    2. Arthralgia, unspecified joint  M25.50 70.40    78year old pleasant female presenting for evaluation of progressive distal lower extremity paresthesias x 4 years with evidence of a length-dependent right slightly greater than left distal sensorimotor polyneuropathy on examination. EMG performed by Jamal Meyer 12/2020 confirms a generalized polyneuropathy without comorbid lumbosacral radiculopathy. Neuropathy laboratory investigations were reviewed and normal. Discussed diagnosis of idiopathic polyneuropathy, which is seen in approximately 30% of cases. This may progress slowly over time. Thankfully, she is not exhibiting significant dysesthesias. Comorbid arthralgia and aching pain from her knees/hips seems to be a more prominent cause of discomfort. This appears to be responding well to low dose duloxetine. I have advised follow up for repeat clinical assessment if any new/worsening neurologic symptoms. Follow-up and Dispositions    · Return if symptoms worsen or fail to improve. I have discussed the diagnosis with the patient today and the intended plan as seen in the above orders with both the patient as well as referring provider and/or PCP via electronic correspondence. The patient has received an after-visit summary and questions were answered concerning future plans. Racquel Villanueva DO  Staff Neurologist  Diplomate, American Board of Psychiatry & Neurology       CC Referring provider:  Victor Hugo Thomas MD  [address] [place] [address]   [address] [address]

## 2021-01-26 RX ORDER — METHENAMINE, SODIUM PHOSPHATE, MONOBASIC, ANHYDROUS, PHENYL SALICYLATE, METHYLENE BLUE AND HYOSCYAMINE SULFATE 118; 40.8; 36; 10; .12 MG/1; MG/1; MG/1; MG/1; MG/1
CAPSULE ORAL
Qty: 60 CAP | Refills: 3 | Status: SHIPPED | OUTPATIENT
Start: 2021-01-26 | End: 2021-09-13

## 2021-01-29 ENCOUNTER — IMMUNIZATION (OUTPATIENT)
Dept: INTERNAL MEDICINE CLINIC | Age: 81
End: 2021-01-29
Payer: MEDICARE

## 2021-01-29 DIAGNOSIS — Z23 ENCOUNTER FOR IMMUNIZATION: Primary | ICD-10-CM

## 2021-01-29 PROCEDURE — 0011A PR IMM ADMN SARSCOV2 100 MCG/0.5 ML 1ST DOSE: CPT | Performed by: FAMILY MEDICINE

## 2021-01-29 PROCEDURE — 91301 COVID-19, MRNA, LNP-S, PF, 100MCG/0.5ML DOSE(MODERNA): CPT | Performed by: FAMILY MEDICINE

## 2021-02-04 DIAGNOSIS — G62.9 NEUROPATHY: ICD-10-CM

## 2021-02-04 RX ORDER — DULOXETIN HYDROCHLORIDE 20 MG/1
CAPSULE, DELAYED RELEASE ORAL
Qty: 30 CAP | Refills: 0 | Status: SHIPPED | OUTPATIENT
Start: 2021-02-04 | End: 2021-02-22

## 2021-02-04 NOTE — TELEPHONE ENCOUNTER
Requested Prescriptions     Pending Prescriptions Disp Refills    DULoxetine (CYMBALTA) 20 mg capsule 30 Cap 0       Requested Quantity : 90.0            [id number] [place], [address] [facility]  [phone]

## 2021-02-22 DIAGNOSIS — G62.9 NEUROPATHY: ICD-10-CM

## 2021-02-22 RX ORDER — DULOXETIN HYDROCHLORIDE 20 MG/1
CAPSULE, DELAYED RELEASE ORAL
Qty: 30 CAP | Refills: 0 | Status: SHIPPED | OUTPATIENT
Start: 2021-02-22 | End: 2021-03-24 | Stop reason: SDUPTHER

## 2021-02-26 ENCOUNTER — IMMUNIZATION (OUTPATIENT)
Dept: INTERNAL MEDICINE CLINIC | Age: 81
End: 2021-02-26
Payer: MEDICARE

## 2021-02-26 DIAGNOSIS — Z23 ENCOUNTER FOR IMMUNIZATION: Primary | ICD-10-CM

## 2021-02-26 PROCEDURE — 91301 COVID-19, MRNA, LNP-S, PF, 100MCG/0.5ML DOSE(MODERNA): CPT | Performed by: FAMILY MEDICINE

## 2021-02-26 PROCEDURE — 0012A COVID-19, MRNA, LNP-S, PF, 100MCG/0.5ML DOSE(MODERNA): CPT | Performed by: FAMILY MEDICINE

## 2021-03-10 ENCOUNTER — HOSPITAL ENCOUNTER (OUTPATIENT)
Dept: MAMMOGRAPHY | Age: 81
Discharge: HOME | End: 2021-03-10
Payer: MEDICARE

## 2021-03-10 DIAGNOSIS — Z12.31 ENCOUNTER FOR SCREENING MAMMOGRAM FOR MALIGNANT NEOPLASM OF BREAST: ICD-10-CM

## 2021-03-10 PROCEDURE — 77067 SCR MAMMO BI INCL CAD: CPT | Performed by: INTERNAL MEDICINE

## 2021-03-24 DIAGNOSIS — G62.9 NEUROPATHY: ICD-10-CM

## 2021-03-24 RX ORDER — DULOXETIN HYDROCHLORIDE 20 MG/1
20 CAPSULE, DELAYED RELEASE ORAL DAILY
Qty: 90 CAP | Refills: 0 | Status: SHIPPED | OUTPATIENT
Start: 2021-03-24 | End: 2021-04-30

## 2021-03-24 NOTE — TELEPHONE ENCOUNTER
Requested Prescriptions     Pending Prescriptions Disp Refills    DULoxetine (CYMBALTA) 20 mg capsule 30 Cap 0           Requested Quantity : 90.0        [id number]- [place], [facility]  [phone]

## 2021-04-07 DIAGNOSIS — I10 ESSENTIAL HYPERTENSION: ICD-10-CM

## 2021-04-07 RX ORDER — BISOPROLOL FUMARATE AND HYDROCHLOROTHIAZIDE 5; 6.25 MG/1; MG/1
TABLET ORAL
Qty: 90 TAB | Refills: 0 | Status: SHIPPED | OUTPATIENT
Start: 2021-04-07 | End: 2021-08-06

## 2021-04-30 DIAGNOSIS — G62.9 NEUROPATHY: ICD-10-CM

## 2021-04-30 DIAGNOSIS — M54.40 LUMBAGO WITH SCIATICA, UNSPECIFIED SIDE: ICD-10-CM

## 2021-04-30 RX ORDER — TRAMADOL HYDROCHLORIDE 50 MG/1
TABLET ORAL
Qty: 30 TAB | Refills: 0 | Status: SHIPPED | OUTPATIENT
Start: 2021-04-30 | End: 2021-05-28

## 2021-04-30 RX ORDER — DULOXETIN HYDROCHLORIDE 20 MG/1
CAPSULE, DELAYED RELEASE ORAL
Qty: 90 CAP | Refills: 0 | Status: SHIPPED | OUTPATIENT
Start: 2021-04-30 | End: 2021-06-24 | Stop reason: DRUGHIGH

## 2021-05-11 DIAGNOSIS — F51.01 PRIMARY INSOMNIA: ICD-10-CM

## 2021-05-11 RX ORDER — ZOLPIDEM TARTRATE 5 MG/1
TABLET ORAL
Qty: 30 TAB | Refills: 1 | Status: SHIPPED | OUTPATIENT
Start: 2021-05-11 | End: 2022-01-12

## 2021-05-14 ENCOUNTER — TRANSCRIBE ORDER (OUTPATIENT)
Dept: REGISTRATION | Age: 81
End: 2021-05-14

## 2021-05-14 DIAGNOSIS — Z01.812 PRE-PROCEDURE LAB EXAM: Primary | ICD-10-CM

## 2021-05-18 PROBLEM — M16.12 PRIMARY LOCALIZED OSTEOARTHRITIS OF LEFT HIP: Status: ACTIVE | Noted: 2021-05-18

## 2021-05-18 NOTE — H&P
Patient ID: Vince Sutton is a 78 y.o. female.     Chief Complaint: Pain of the Left Hip        Vince Sutton is a 78 y.o. female who presents to clinic for evaluation of left hip and right shoulder. Patient reports severe chronic left hip pain that is exacerbated by walking. She states she can walk with a limp and has severe trouble walking. She rates the pain to be 10 out of 10 in intensity at this time. He currently does not have any radiculopathy symptoms such as radiation of the pain, numbness, tingling or other complaints. She presents today to discuss left total hip arthroplasty.      She mentions mild right shoulder pain with onset few weeks.ago. It is exacerbated by movement. She has been taking anti-inflammatories with moderate improvement.         Review of Systems   5/12/2021     Constitutional: Unexplained: Negative  Genitourinary: Frequent Urination: Positive  HEENT: Vision Loss: Positive  Neurological: Memory Loss: Negative  Integumentary: Rash: Negative  Cardiovascular: Palpatations: Negative  Hematologic: Bruises/Bleeds Easily: Negative  Gastrointestinal: Constipation: Negative  Immunological: Seasonal Allergies: Positive  Musculoskeletal: Joint Pain: Positive     Medical History        Past Medical History:   Diagnosis Date    GERD (gastroesophageal reflux disease)      Hypertension      Osteoarthritis              Surgical History         Past Surgical History:   Procedure Laterality Date    NO RELEVANT ORTHOPAEDIC SURGERIES        NO RELEVANT SURGERIES                Objective:          Vitals:     05/12/21 1613   BP: 127/74         Constitutional:  No acute distress. Well nourished. Well developed. Eyes:  Sclera are nonicteric. Respiratory:  No labored breathing. Cardiovascular:  No marked edema. Skin:  No marked skin ulcers. Neurological:  No marked sensory loss noted. Psychiatric: Alert and oriented x3.   Musculoskeletal      Examination of the LEFT hip reveals ambulates with a limp. 75% reduction in the rotation. Left leg 1 cm shorter than the right. Skin intact. No effusion. No sign of infection. No ecchymosis or erythema. No cellulitis or rash. No calf pain, swelling, or other evidence of DVT. I detect no obvious motor or sensory deficits in the lower extremity. The neurovascular status is intact.     Examination of the RIGHT shoulder reveals mild tenderness. Elevation- 150 degrees. No neck pain. No numbness or paresthesias in the hands. No effusion. No sign of infection. No cellulitis or rash. I detect no motor or sensory deficits in the upper extremities. The neurovascular status of the upper extremities is intact.     Imaging/Studies:    Order: XR HIP 2+ VW LEFT - Indication: Primary osteoarthritis of left hip  Order: XR SHOULDER 2+ VW RIGHT - Indication: Right shoulder pain,   unspecified chronicity        X-ray shoulder right 2+ views     Result Date: 5/12/2021  Standing. [address] AP, Scapula Y, AP.      Impression: I ordered and interpreted x-rays of the RIGHT hip which reveal calcification of the coracoclavicular ligament. Slight irregularity of the greater tuberosity. Small osteophyte inferior humeral. No fractures or evidence of metastatic disease.     X-ray hip left 2-3 views w/pelvis when performed ([id number])     Result Date: 5/12/2021  Shielding: Not Shielded. Non-Weight Bearing. AP Pelvis, Frog Lateral.      Impression: I ordered and interpreted x-rays of the LEFT hip which reveal advanced degenerative arthritis. No fractures or evidence of metastatic disease. I ordered and interpreted x-rays of the RIGHT hip which reveal mild to moderate degenerative arthritis. No fractures or evidence of metastatic disease.         Assessment:          Patient Active Problem List   Diagnosis    Essential hypertension             ICD-10-CM   1. Primary osteoarthritis of left hip  M16.12   2.  Right shoulder pain, unspecified chronicity  M25.511         Plan:   I personally reviewed my findings with the patient. I reviewed the pathophysiology and explained that she has advanced degenerative arthritis of the left hip. I ordered and interpreted X-rays. I have explained to her that she has severe degenerative arthritis and will need a left total hip arthroplasty. Patient endorses a limp and leg inequality. I reviewed the hospitalization as well as post-op course and rehabilitation for total hip replacement. We discussed all complications associated with the surgery. She understands that she would be on antibiotics following the surgery to reduce risk of infection. She understands that she would be on anti-coagulants following the surgery to reduce risk of DVT and PE. I discussed issues of leg length and instability. I discussed the chance of dislocation following total hip replacement and instructed her on total hip precautions. PROCEDURE: Left total hip replacement. Date of Surgery Update:  Marcy Ponce was seen and examined. Past Medical History:   Diagnosis Date    Arthritis     DDD BACK PAIN    Chest pain, atypical     Chronic pain     HIP    Diverticulitis of colon     Fibrocystic disease of breast     GERD (gastroesophageal reflux disease)     Hypercholesterolemia     Hypertension     IBS (irritable bowel syndrome)     Menopause 1968    Neuropathy     Ovarian cyst      Prior to Admission Medications   Prescriptions Last Dose Informant Patient Reported? Taking? B.infantis-B.ani-B.long-B.bifi (PROBIOTIC 4X) 10-15 mg TbEC 5/19/2021 at Unknown time  Yes Yes   Sig: Take  by mouth. DULoxetine (CYMBALTA) 20 mg capsule 5/26/2021 at Unknown time  No Yes   Sig: TAKE 1 CAPSULE BY MOUTH EVERY DAY   OTHER   Yes No   Sig: JOINT CREAM   Uro--10-40.8-36 mg cap capsule 5/25/2021 at Unknown time  No Yes   Sig: TAKE 1 CAP BY MOUTH FOUR (4) TIMES DAILY AS NEEDED FOR PAIN.    acetaminophen (Tylenol Extra Strength) 500 mg tablet 5/26/2021 at 0500  Yes Yes   Sig: Take  by mouth every six (6) hours as needed for Pain. bisoprolol-hydroCHLOROthiazide (ZIAC) 5-6.25 mg per tablet 5/26/2021 at 0600  No Yes   Sig: TAKE 1 TABLET BY MOUTH EVERY DAY   calcium carb/vit D3/minerals (CALCIUM-VITAMIN D PO) 5/19/2021 at Unknown time  Yes Yes   Sig: Take  by mouth. cimetidine (Tagamet HB) 200 mg tab 5/23/2021  Yes No   Sig: Take 200 mg by mouth as needed. diclofenac EC (VOLTAREN) 75 mg EC tablet 5/24/2021 at Unknown time  No Yes   Sig: TAKE 1 TAB BY MOUTH ONCE A DAY AS NEEDED. diphenhydrAMINE (BenadryL) 25 mg capsule 5/23/2021  Yes No   Sig: Take 25 mg by mouth nightly as needed for Allergies. traMADoL (ULTRAM) 50 mg tablet 5/26/2021 at 0500  No Yes   Sig: TAKE 1 TABLET BY MOUTH EVERY 6 HOURS AS NEEDED   zolpidem (AMBIEN) 5 mg tablet 4/26/2021 at Unknown time  No Yes   Sig: TAKE 1/2 TO 1 TABLET BY MOUTH AT BEDTIME AS NEEDED      Facility-Administered Medications: None      Allergy to:Pcn [penicillins] and Sonata [zaleplon]  Physical Examination: General appearance - alert, well appearing, and in no distress  History and physical has been reviewed. The patient has been examined.  There have been no significant clinical changes since the completion of the originally dated History and Physical.    Signed By: Ashlee Dickson PA-C     May 26, 2021 9:57 AM

## 2021-05-20 ENCOUNTER — HOSPITAL ENCOUNTER (OUTPATIENT)
Dept: PREADMISSION TESTING | Age: 81
Discharge: HOME | End: 2021-05-20
Payer: MEDICARE

## 2021-05-20 VITALS
DIASTOLIC BLOOD PRESSURE: 75 MMHG | BODY MASS INDEX: 26.64 KG/M2 | HEART RATE: 73 BPM | SYSTOLIC BLOOD PRESSURE: 173 MMHG | OXYGEN SATURATION: 100 % | HEIGHT: 66 IN | TEMPERATURE: 97.7 F | WEIGHT: 165.79 LBS

## 2021-05-20 LAB
ABO + RH BLD: NORMAL
ANION GAP SERPL CALC-SCNC: 6 MMOL/L (ref 5–15)
APPEARANCE UR: CLEAR
ATRIAL RATE: 64 BPM
BACTERIA URNS QL MICRO: NEGATIVE /HPF
BILIRUB UR QL: NEGATIVE
BLOOD GROUP ANTIBODIES SERPL: NORMAL
BUN SERPL-MCNC: 11 MG/DL (ref 6–20)
BUN/CREAT SERPL: 27 (ref 12–20)
CALCIUM SERPL-MCNC: 9.6 MG/DL (ref 8.5–10.1)
CALCULATED P AXIS, ECG09: 47 DEGREES
CALCULATED R AXIS, ECG10: 3 DEGREES
CALCULATED T AXIS, ECG11: 21 DEGREES
CHLORIDE SERPL-SCNC: 102 MMOL/L (ref 97–108)
CO2 SERPL-SCNC: 28 MMOL/L (ref 21–32)
COLOR UR: ABNORMAL
CREAT SERPL-MCNC: 0.41 MG/DL (ref 0.55–1.02)
DIAGNOSIS, 93000: NORMAL
EPITH CASTS URNS QL MICRO: ABNORMAL /LPF
ERYTHROCYTE [DISTWIDTH] IN BLOOD BY AUTOMATED COUNT: 13.2 % (ref 11.5–14.5)
EST. AVERAGE GLUCOSE BLD GHB EST-MCNC: 117 MG/DL
GLUCOSE SERPL-MCNC: 99 MG/DL (ref 65–100)
GLUCOSE UR STRIP.AUTO-MCNC: NEGATIVE MG/DL
HBA1C MFR BLD: 5.7 % (ref 4–5.6)
HCT VFR BLD AUTO: 38.8 % (ref 35–47)
HGB BLD-MCNC: 12.4 G/DL (ref 11.5–16)
HGB UR QL STRIP: NEGATIVE
HYALINE CASTS URNS QL MICRO: ABNORMAL /LPF (ref 0–5)
INR PPP: 1 (ref 0.9–1.1)
KETONES UR QL STRIP.AUTO: NEGATIVE MG/DL
LEUKOCYTE ESTERASE UR QL STRIP.AUTO: ABNORMAL
MCH RBC QN AUTO: 29.1 PG (ref 26–34)
MCHC RBC AUTO-ENTMCNC: 32 G/DL (ref 30–36.5)
MCV RBC AUTO: 91.1 FL (ref 80–99)
NITRITE UR QL STRIP.AUTO: NEGATIVE
NRBC # BLD: 0 K/UL (ref 0–0.01)
NRBC BLD-RTO: 0 PER 100 WBC
P-R INTERVAL, ECG05: 158 MS
PH UR STRIP: 7 [PH] (ref 5–8)
PLATELET # BLD AUTO: 280 K/UL (ref 150–400)
PMV BLD AUTO: 11.2 FL (ref 8.9–12.9)
POTASSIUM SERPL-SCNC: 4.1 MMOL/L (ref 3.5–5.1)
PROT UR STRIP-MCNC: NEGATIVE MG/DL
PROTHROMBIN TIME: 10.5 SEC (ref 9–11.1)
Q-T INTERVAL, ECG07: 412 MS
QRS DURATION, ECG06: 78 MS
QTC CALCULATION (BEZET), ECG08: 425 MS
RBC # BLD AUTO: 4.26 M/UL (ref 3.8–5.2)
RBC #/AREA URNS HPF: ABNORMAL /HPF (ref 0–5)
SODIUM SERPL-SCNC: 136 MMOL/L (ref 136–145)
SP GR UR REFRACTOMETRY: 1.01 (ref 1–1.03)
SPECIMEN EXP DATE BLD: NORMAL
UA: UC IF INDICATED,UAUC: ABNORMAL
UROBILINOGEN UR QL STRIP.AUTO: 0.2 EU/DL (ref 0.2–1)
VENTRICULAR RATE, ECG03: 64 BPM
WBC # BLD AUTO: 6.1 K/UL (ref 3.6–11)
WBC URNS QL MICRO: ABNORMAL /HPF (ref 0–4)

## 2021-05-20 PROCEDURE — 85027 COMPLETE CBC AUTOMATED: CPT

## 2021-05-20 PROCEDURE — 83036 HEMOGLOBIN GLYCOSYLATED A1C: CPT

## 2021-05-20 PROCEDURE — 93005 ELECTROCARDIOGRAM TRACING: CPT

## 2021-05-20 PROCEDURE — 80048 BASIC METABOLIC PNL TOTAL CA: CPT

## 2021-05-20 PROCEDURE — 81001 URINALYSIS AUTO W/SCOPE: CPT

## 2021-05-20 PROCEDURE — 85610 PROTHROMBIN TIME: CPT

## 2021-05-20 PROCEDURE — 86900 BLOOD TYPING SEROLOGIC ABO: CPT

## 2021-05-20 PROCEDURE — 36415 COLL VENOUS BLD VENIPUNCTURE: CPT

## 2021-05-20 RX ORDER — DIPHENHYDRAMINE HCL 25 MG
25 CAPSULE ORAL
COMMUNITY

## 2021-05-20 NOTE — PERIOP NOTES
Preoperative instructions reviewed with patient. Patient given two bottles of CHG soap. Instructions (reviewed/to be reviewed in class) on use of CHG soap. Patient given SSI infection FAQS sheet, as well as a MRSA/MSSA treatment instruction sheet with an explanation to patient that they will be notified if treatment instructions need to be initiated. Patient was given the opportunity to ask questions on the information provided. INFORMATION REGARDING COVID 19 TESTING PRIOR TO SURGERY WAS PROVIDED TO THE PATIENT WITH THE OPPORTUNITY FOR QUESTIONS. PT VERBALIZED UNDERSTANDING.

## 2021-05-21 LAB
BACTERIA SPEC CULT: NORMAL
BACTERIA SPEC CULT: NORMAL
SERVICE CMNT-IMP: NORMAL

## 2021-05-21 NOTE — PERIOP NOTES
PAT Nurse Practitioner   Pre-Operative Chart Review/Assessment:-ORTHOPEDIC                Patient Name:  Luis F Hernandez                                                           Age:   78 y.o.    :  1940     Today's Date:  2021     Date of PAT:   2021      Date of Surgery:    2021      Procedure(s):  Left Total Hip Arthroplasty     Surgeon:   Dr. Mitchell Vasquez                       PLAN:      1)  Medical Clearance:  Dr. Melanie Damian III      2)  Cardiac Clearance:  EKG and METs reviewed. No further cardiac testing requested. 3)  Diabetic Treatment Consult:  Not indicated. A1c-5.7      4)  Sleep Apnea evaluation:   Not indicated. MIRIAN Score 3.       5) Treatment for MRSA/Staph Aureus:  Neg      6) Additional Concerns:  HTN, GERD                Vital Signs:         Vitals:    21 1147   BP: (!) 173/75   Pulse: 73   Temp: 97.7 °F (36.5 °C)   SpO2: 100%   Weight: 75.2 kg (165 lb 12.6 oz)   Height: 5' 6\" (1.676 m)            ____________________________________________  PAST MEDICAL HISTORY  Past Medical History:   Diagnosis Date    Arthritis     DDD BACK PAIN    Chest pain, atypical     Chronic pain     HIP    Diverticulitis of colon     Fibrocystic disease of breast     GERD (gastroesophageal reflux disease)     Hypercholesterolemia     Hypertension     IBS (irritable bowel syndrome)     Menopause 1968    Neuropathy     Ovarian cyst       ____________________________________________  PAST SURGICAL HISTORY  Past Surgical History:   Procedure Laterality Date    ENDOSCOPY, COLON, DIAGNOSTIC  10/22/2010    5 yr fu, [facility]    HX APPENDECTOMY      HX COLONOSCOPY  10/20/2016    Dr. Breanne Fox - 5 yr f/u    HX DILATION AND CURETTAGE      HX TONSILLECTOMY        ____________________________________________  HOME MEDICATIONS  Current Outpatient Medications   Medication Sig    OTHER JOINT CREAM    diphenhydrAMINE (BenadryL) 25 mg capsule Take 25 mg by mouth nightly as needed for Allergies.  zolpidem (AMBIEN) 5 mg tablet TAKE 1/2 TO 1 TABLET BY MOUTH AT BEDTIME AS NEEDED    traMADoL (ULTRAM) 50 mg tablet TAKE 1 TABLET BY MOUTH EVERY 6 HOURS AS NEEDED    DULoxetine (CYMBALTA) 20 mg capsule TAKE 1 CAPSULE BY MOUTH EVERY DAY    bisoprolol-hydroCHLOROthiazide (ZIAC) 5-6.25 mg per tablet TAKE 1 TABLET BY MOUTH EVERY DAY    Uro--10-40.8-36 mg cap capsule TAKE 1 CAP BY MOUTH FOUR (4) TIMES DAILY AS NEEDED FOR PAIN.  diclofenac EC (VOLTAREN) 75 mg EC tablet TAKE 1 TAB BY MOUTH ONCE A DAY AS NEEDED.  acetaminophen (Tylenol Extra Strength) 500 mg tablet Take  by mouth every six (6) hours as needed for Pain.  cimetidine (Tagamet HB) 200 mg tab Take 200 mg by mouth as needed.  calcium carb/vit D3/minerals (CALCIUM-VITAMIN D PO) Take  by mouth.  B.infantis-B.ani-B.long-B.bifi (PROBIOTIC 4X) 10-15 mg TbEC Take  by mouth. No current facility-administered medications for this encounter.      ____________________________________________  ALLERGIES  Allergies   Allergen Reactions    Pcn [Penicillins] Swelling     Arm with injection    Sonata [Zaleplon] Itching      ____________________________________________  SOCIAL HISTORY  Social History     Tobacco Use    Smoking status: Never Smoker    Smokeless tobacco: Never Used   Substance Use Topics    Alcohol use:  Yes     Alcohol/week: 1.7 - 3.3 standard drinks     Types: 2 - 4 Standard drinks or equivalent per week     Comment: OCCASIONALLY A GLASS OF WINE      ____________________________________________        Labs:     Hospital Outpatient Visit on 05/20/2021   Component Date Value Ref Range Status    Sodium 05/20/2021 136  136 - 145 mmol/L Final    Potassium 05/20/2021 4.1  3.5 - 5.1 mmol/L Final    Chloride 05/20/2021 102  97 - 108 mmol/L Final    CO2 05/20/2021 28  21 - 32 mmol/L Final    Anion gap 05/20/2021 6  5 - 15 mmol/L Final    Glucose 05/20/2021 99  65 - 100 mg/dL Final    BUN 05/20/2021 11  6 - 20 MG/DL Final    Creatinine 05/20/2021 0.41* 0.55 - 1.02 MG/DL Final    BUN/Creatinine ratio 05/20/2021 27* 12 - 20   Final    GFR est AA 05/20/2021 >60  >60 ml/min/1.73m2 Final    GFR est non-AA 05/20/2021 >60  >60 ml/min/1.73m2 Final    Estimated GFR is calculated using the IDMS-traceable Modification of Diet in Renal Disease (MDRD) Study equation, reported for both  Americans (GFRAA) and non- Americans (GFRNA), and normalized to 1.73m2 body surface area. The physician must decide which value applies to the patient.  Calcium 05/20/2021 9.6  8.5 - 10.1 MG/DL Final    WBC 05/20/2021 6.1  3.6 - 11.0 K/uL Final    RBC 05/20/2021 4.26  3.80 - 5.20 M/uL Final    HGB 05/20/2021 12.4  11.5 - 16.0 g/dL Final    HCT 05/20/2021 38.8  35.0 - 47.0 % Final    MCV 05/20/2021 91.1  80.0 - 99.0 FL Final    MCH 05/20/2021 29.1  26.0 - 34.0 PG Final    MCHC 05/20/2021 32.0  30.0 - 36.5 g/dL Final    RDW 05/20/2021 13.2  11.5 - 14.5 % Final    PLATELET 98/22/6283 018  150 - 400 K/uL Final    MPV 05/20/2021 11.2  8.9 - 12.9 FL Final    NRBC 05/20/2021 0.0  0  WBC Final    ABSOLUTE NRBC 05/20/2021 0.00  0.00 - 0.01 K/uL Final    Crossmatch Expiration 05/20/2021 05/29/2021,2359   Final    ABO/Rh(D) 05/20/2021 A NEGATIVE   Final    Antibody screen 05/20/2021 NEG   Final    INR 05/20/2021 1.0  0.9 - 1.1   Final    A single therapeutic range for Vit K antagonists may not be optimal for all indications - see June, 2008 issue of Chest, American College of Chest Physicians Evidence-Based Clinical Practice Guidelines, 8th Edition.     Prothrombin time 05/20/2021 10.5  9.0 - 11.1 sec Final    Color 05/20/2021 YELLOW/STRAW    Final    Color Reference Range: Straw, Yellow or Dark Yellow    Appearance 05/20/2021 CLEAR  CLEAR   Final    Specific gravity 05/20/2021 1.013  1.003 - 1.030   Final    pH (UA) 05/20/2021 7.0  5.0 - 8.0   Final    Protein 05/20/2021 Negative  NEG mg/dL Final    Glucose 05/20/2021 Negative NEG mg/dL Final    Ketone 05/20/2021 Negative  NEG mg/dL Final    Bilirubin 05/20/2021 Negative  NEG   Final    Blood 05/20/2021 Negative  NEG   Final    Urobilinogen 05/20/2021 0.2  0.2 - 1.0 EU/dL Final    Nitrites 05/20/2021 Negative  NEG   Final    Leukocyte Esterase 05/20/2021 MODERATE* NEG   Final    UA:UC IF INDICATED 05/20/2021 CULTURE NOT INDICATED BY UA RESULT  CNI   Final    WBC 05/20/2021 5-10  0 - 4 /hpf Final    RBC 05/20/2021 0-5  0 - 5 /hpf Final    Epithelial cells 05/20/2021 FEW  FEW /lpf Final    Epithelial cell category consists of squamous cells and /or transitional urothelial cells. Renal tubular cells, if present, are separately identified as such.  Bacteria 05/20/2021 Negative  NEG /hpf Final    Hyaline cast 05/20/2021 0-2  0 - 5 /lpf Final    Ventricular Rate 05/20/2021 64  BPM Final    Atrial Rate 05/20/2021 64  BPM Final    P-R Interval 05/20/2021 158  ms Final    QRS Duration 05/20/2021 78  ms Final    Q-T Interval 05/20/2021 412  ms Final    QTC Calculation (Bezet) 05/20/2021 425  ms Final    Calculated P Axis 05/20/2021 47  degrees Final    Calculated R Axis 05/20/2021 3  degrees Final    Calculated T Axis 05/20/2021 21  degrees Final    Diagnosis 05/20/2021    Final                    Value:Normal sinus rhythm  Nonspecific T wave abnormality  No previous ECGs available  Confirmed by Kaitlin Miner M.D., Valerio Sanchez ([id number]) on 5/20/2021 6:07:08 PM      Hemoglobin A1c 05/20/2021 5.7* 4.0 - 5.6 % Final    Comment: NEW METHOD  PLEASE NOTE NEW REFERENCE RANGE  (NOTE)  HbA1C Interpretive Ranges  <5.7              Normal  5.7 - 6.4         Consider Prediabetes  >6.5              Consider Diabetes      Est. average glucose 05/20/2021 117  mg/dL Final    Special Requests: 05/20/2021 NO SPECIAL REQUESTS    Final    Culture result: 05/20/2021 MRSA NOT PRESENT    Final       Skin:     Denies open wounds, cuts, sores, rashes or other areas of concern in PAT assessment. Neva Leger, NP

## 2021-05-22 ENCOUNTER — HOSPITAL ENCOUNTER (OUTPATIENT)
Dept: PREADMISSION TESTING | Age: 81
Discharge: HOME | End: 2021-05-22
Payer: MEDICARE

## 2021-05-22 DIAGNOSIS — Z01.812 PRE-PROCEDURE LAB EXAM: ICD-10-CM

## 2021-05-22 PROCEDURE — U0003 INFECTIOUS AGENT DETECTION BY NUCLEIC ACID (DNA OR RNA); SEVERE ACUTE RESPIRATORY SYNDROME CORONAVIRUS 2 (SARS-COV-2) (CORONAVIRUS DISEASE [COVID-19]), AMPLIFIED PROBE TECHNIQUE, MAKING USE OF HIGH THROUGHPUT TECHNOLOGIES AS DESCRIBED BY CMS-2020-01-R: HCPCS

## 2021-05-23 LAB — SARS-COV-2, COV2NT: NOT DETECTED

## 2021-05-25 ENCOUNTER — ANESTHESIA EVENT (OUTPATIENT)
Dept: SURGERY | Age: 81
End: 2021-05-25
Payer: MEDICARE

## 2021-05-26 ENCOUNTER — ANESTHESIA (OUTPATIENT)
Dept: SURGERY | Age: 81
End: 2021-05-26
Payer: MEDICARE

## 2021-05-26 ENCOUNTER — APPOINTMENT (OUTPATIENT)
Dept: GENERAL RADIOLOGY | Age: 81
End: 2021-05-26
Attending: PHYSICIAN ASSISTANT
Payer: MEDICARE

## 2021-05-26 ENCOUNTER — HOSPITAL ENCOUNTER (OUTPATIENT)
Age: 81
Discharge: HOME HEALTH | End: 2021-05-28
Attending: ORTHOPAEDIC SURGERY | Admitting: ORTHOPAEDIC SURGERY
Payer: MEDICARE

## 2021-05-26 DIAGNOSIS — Z96.649 STATUS POST HIP REPLACEMENT, UNSPECIFIED LATERALITY: Primary | ICD-10-CM

## 2021-05-26 LAB
GLUCOSE BLD STRIP.AUTO-MCNC: 102 MG/DL (ref 65–117)
SERVICE CMNT-IMP: NORMAL

## 2021-05-26 PROCEDURE — 77030011264 HC ELECTRD BLD EXT COVD -A: Performed by: ORTHOPAEDIC SURGERY

## 2021-05-26 PROCEDURE — 74011000258 HC RX REV CODE- 258: Performed by: PHYSICIAN ASSISTANT

## 2021-05-26 PROCEDURE — 77030040361 HC SLV COMPR DVT MDII -B: Performed by: ORTHOPAEDIC SURGERY

## 2021-05-26 PROCEDURE — 77030007866 HC KT SPN ANES BBMI -B: Performed by: ANESTHESIOLOGY

## 2021-05-26 PROCEDURE — 74011000250 HC RX REV CODE- 250: Performed by: ORTHOPAEDIC SURGERY

## 2021-05-26 PROCEDURE — 76210000016 HC OR PH I REC 1 TO 1.5 HR: Performed by: ORTHOPAEDIC SURGERY

## 2021-05-26 PROCEDURE — 76060000037 HC ANESTHESIA 3 TO 3.5 HR: Performed by: ORTHOPAEDIC SURGERY

## 2021-05-26 PROCEDURE — 74011250636 HC RX REV CODE- 250/636: Performed by: NURSE ANESTHETIST, CERTIFIED REGISTERED

## 2021-05-26 PROCEDURE — 77030005513 HC CATH URETH FOL11 MDII -B: Performed by: ORTHOPAEDIC SURGERY

## 2021-05-26 PROCEDURE — 74011000250 HC RX REV CODE- 250: Performed by: ANESTHESIOLOGY

## 2021-05-26 PROCEDURE — 76010000173 HC OR TIME 3 TO 3.5 HR INTENSV-TIER 1: Performed by: ORTHOPAEDIC SURGERY

## 2021-05-26 PROCEDURE — 73501 X-RAY EXAM HIP UNI 1 VIEW: CPT

## 2021-05-26 PROCEDURE — 74011000250 HC RX REV CODE- 250: Performed by: PHYSICIAN ASSISTANT

## 2021-05-26 PROCEDURE — 77030018673: Performed by: ORTHOPAEDIC SURGERY

## 2021-05-26 PROCEDURE — 77030031139 HC SUT VCRL2 J&J -A: Performed by: ORTHOPAEDIC SURGERY

## 2021-05-26 PROCEDURE — 97530 THERAPEUTIC ACTIVITIES: CPT

## 2021-05-26 PROCEDURE — 77030006822 HC BLD SAW SAG BRSM -B: Performed by: ORTHOPAEDIC SURGERY

## 2021-05-26 PROCEDURE — 77030008462 HC STPLR SKN PROX J&J -A: Performed by: ORTHOPAEDIC SURGERY

## 2021-05-26 PROCEDURE — 74011250636 HC RX REV CODE- 250/636: Performed by: ANESTHESIOLOGY

## 2021-05-26 PROCEDURE — 2709999900 HC NON-CHARGEABLE SUPPLY

## 2021-05-26 PROCEDURE — 97161 PT EVAL LOW COMPLEX 20 MIN: CPT

## 2021-05-26 PROCEDURE — 77030040922 HC BLNKT HYPOTHRM STRY -A

## 2021-05-26 PROCEDURE — 77030018831 HC SOL IRR H20 BAXT -A: Performed by: ORTHOPAEDIC SURGERY

## 2021-05-26 PROCEDURE — 2709999900 HC NON-CHARGEABLE SUPPLY: Performed by: ORTHOPAEDIC SURGERY

## 2021-05-26 PROCEDURE — 77030036660

## 2021-05-26 PROCEDURE — 97116 GAIT TRAINING THERAPY: CPT

## 2021-05-26 PROCEDURE — C1776 JOINT DEVICE (IMPLANTABLE): HCPCS | Performed by: ORTHOPAEDIC SURGERY

## 2021-05-26 PROCEDURE — 74011250636 HC RX REV CODE- 250/636: Performed by: PHYSICIAN ASSISTANT

## 2021-05-26 PROCEDURE — 77030027138 HC INCENT SPIROMETER -A

## 2021-05-26 PROCEDURE — 77030035236 HC SUT PDS STRATFX BARB J&J -B: Performed by: ORTHOPAEDIC SURGERY

## 2021-05-26 PROCEDURE — 82962 GLUCOSE BLOOD TEST: CPT

## 2021-05-26 PROCEDURE — 74011250637 HC RX REV CODE- 250/637: Performed by: PHYSICIAN ASSISTANT

## 2021-05-26 DEVICE — SUMMIT FEMORAL STEM 12/14 TAPER TAPER ED W/POROCOAT SIZE 5 STD 145MM
Type: IMPLANTABLE DEVICE | Site: HIP | Status: FUNCTIONAL
Brand: SUMMIT POROCOAT

## 2021-05-26 DEVICE — PINNACLE GRIPTION ACETABULAR SHELL SECTOR 52MM OD
Type: IMPLANTABLE DEVICE | Site: HIP | Status: FUNCTIONAL
Brand: PINNACLE GRIPTION

## 2021-05-26 DEVICE — PINNACLE HIP SOLUTIONS ALTRX POLYETHYLENE ACETABULAR LINER NEUTRAL 36MM ID 52MM OD
Type: IMPLANTABLE DEVICE | Site: HIP | Status: FUNCTIONAL
Brand: PINNACLE ALTRX

## 2021-05-26 DEVICE — HEAD FEM DIA36MM +1.5MM OFFSET 12/14 TAPR HIP CERAMIC: Type: IMPLANTABLE DEVICE | Site: HIP | Status: FUNCTIONAL

## 2021-05-26 DEVICE — HIP H2 TOT ADV OTHER HD IMPL CAPPED SYNTHES: Type: IMPLANTABLE DEVICE | Status: FUNCTIONAL

## 2021-05-26 DEVICE — PINNACLE CANCELLOUS BONE SCREW 6.5MM X 20MM
Type: IMPLANTABLE DEVICE | Site: HIP | Status: FUNCTIONAL
Brand: PINNACLE

## 2021-05-26 RX ORDER — SODIUM CHLORIDE 0.9 % (FLUSH) 0.9 %
5-40 SYRINGE (ML) INJECTION AS NEEDED
Status: DISCONTINUED | OUTPATIENT
Start: 2021-05-26 | End: 2021-05-26 | Stop reason: HOSPADM

## 2021-05-26 RX ORDER — ZOLPIDEM TARTRATE 5 MG/1
5 TABLET ORAL
Status: DISCONTINUED | OUTPATIENT
Start: 2021-05-26 | End: 2021-05-28 | Stop reason: HOSPADM

## 2021-05-26 RX ORDER — TRANEXAMIC ACID 100 MG/ML
INJECTION, SOLUTION INTRAVENOUS AS NEEDED
Status: DISCONTINUED | OUTPATIENT
Start: 2021-05-26 | End: 2021-05-26 | Stop reason: HOSPADM

## 2021-05-26 RX ORDER — SODIUM CHLORIDE, SODIUM LACTATE, POTASSIUM CHLORIDE, CALCIUM CHLORIDE 600; 310; 30; 20 MG/100ML; MG/100ML; MG/100ML; MG/100ML
50 INJECTION, SOLUTION INTRAVENOUS CONTINUOUS
Status: DISCONTINUED | OUTPATIENT
Start: 2021-05-26 | End: 2021-05-26 | Stop reason: HOSPADM

## 2021-05-26 RX ORDER — HYDROXYZINE HYDROCHLORIDE 10 MG/1
10 TABLET, FILM COATED ORAL
Status: DISCONTINUED | OUTPATIENT
Start: 2021-05-26 | End: 2021-05-28 | Stop reason: HOSPADM

## 2021-05-26 RX ORDER — SODIUM CHLORIDE 0.9 % (FLUSH) 0.9 %
5-40 SYRINGE (ML) INJECTION AS NEEDED
Status: DISCONTINUED | OUTPATIENT
Start: 2021-05-26 | End: 2021-05-28 | Stop reason: HOSPADM

## 2021-05-26 RX ORDER — BUPIVACAINE HYDROCHLORIDE 5 MG/ML
INJECTION, SOLUTION EPIDURAL; INTRACAUDAL
Status: COMPLETED | OUTPATIENT
Start: 2021-05-26 | End: 2021-05-26

## 2021-05-26 RX ORDER — PROPOFOL 10 MG/ML
INJECTION, EMULSION INTRAVENOUS AS NEEDED
Status: DISCONTINUED | OUTPATIENT
Start: 2021-05-26 | End: 2021-05-26 | Stop reason: HOSPADM

## 2021-05-26 RX ORDER — SODIUM CHLORIDE 9 MG/ML
INJECTION, SOLUTION INTRAVENOUS
Status: DISCONTINUED | OUTPATIENT
Start: 2021-05-26 | End: 2021-05-26 | Stop reason: HOSPADM

## 2021-05-26 RX ORDER — FAMOTIDINE 20 MG/1
20 TABLET, FILM COATED ORAL
Status: DISCONTINUED | OUTPATIENT
Start: 2021-05-26 | End: 2021-05-28 | Stop reason: HOSPADM

## 2021-05-26 RX ORDER — AMOXICILLIN 250 MG
1 CAPSULE ORAL 2 TIMES DAILY
Status: DISCONTINUED | OUTPATIENT
Start: 2021-05-26 | End: 2021-05-28 | Stop reason: HOSPADM

## 2021-05-26 RX ORDER — OXYCODONE HYDROCHLORIDE 5 MG/1
10 TABLET ORAL
Status: DISCONTINUED | OUTPATIENT
Start: 2021-05-26 | End: 2021-05-27

## 2021-05-26 RX ORDER — ONDANSETRON 2 MG/ML
INJECTION INTRAMUSCULAR; INTRAVENOUS AS NEEDED
Status: DISCONTINUED | OUTPATIENT
Start: 2021-05-26 | End: 2021-05-26 | Stop reason: HOSPADM

## 2021-05-26 RX ORDER — ACETAMINOPHEN 325 MG/1
650 TABLET ORAL
Status: DISCONTINUED | OUTPATIENT
Start: 2021-05-26 | End: 2021-05-28 | Stop reason: HOSPADM

## 2021-05-26 RX ORDER — DEXAMETHASONE SODIUM PHOSPHATE 4 MG/ML
INJECTION, SOLUTION INTRA-ARTICULAR; INTRALESIONAL; INTRAMUSCULAR; INTRAVENOUS; SOFT TISSUE AS NEEDED
Status: DISCONTINUED | OUTPATIENT
Start: 2021-05-26 | End: 2021-05-26 | Stop reason: HOSPADM

## 2021-05-26 RX ORDER — PROPOFOL 10 MG/ML
INJECTION, EMULSION INTRAVENOUS
Status: DISCONTINUED | OUTPATIENT
Start: 2021-05-26 | End: 2021-05-26 | Stop reason: HOSPADM

## 2021-05-26 RX ORDER — MORPHINE SULFATE 2 MG/ML
2 INJECTION, SOLUTION INTRAMUSCULAR; INTRAVENOUS
Status: DISCONTINUED | OUTPATIENT
Start: 2021-05-26 | End: 2021-05-26 | Stop reason: HOSPADM

## 2021-05-26 RX ORDER — LIDOCAINE HYDROCHLORIDE 10 MG/ML
0.1 INJECTION, SOLUTION EPIDURAL; INFILTRATION; INTRACAUDAL; PERINEURAL AS NEEDED
Status: DISCONTINUED | OUTPATIENT
Start: 2021-05-26 | End: 2021-05-26 | Stop reason: HOSPADM

## 2021-05-26 RX ORDER — ONDANSETRON 2 MG/ML
4 INJECTION INTRAMUSCULAR; INTRAVENOUS
Status: ACTIVE | OUTPATIENT
Start: 2021-05-26 | End: 2021-05-27

## 2021-05-26 RX ORDER — SODIUM CHLORIDE 0.9 % (FLUSH) 0.9 %
5-40 SYRINGE (ML) INJECTION EVERY 8 HOURS
Status: DISCONTINUED | OUTPATIENT
Start: 2021-05-26 | End: 2021-05-26 | Stop reason: HOSPADM

## 2021-05-26 RX ORDER — HYDROMORPHONE HYDROCHLORIDE 1 MG/ML
0.5 INJECTION, SOLUTION INTRAMUSCULAR; INTRAVENOUS; SUBCUTANEOUS
Status: ACTIVE | OUTPATIENT
Start: 2021-05-26 | End: 2021-05-27

## 2021-05-26 RX ORDER — ACETAMINOPHEN 325 MG/1
650 TABLET ORAL EVERY 6 HOURS
Status: DISCONTINUED | OUTPATIENT
Start: 2021-05-26 | End: 2021-05-28 | Stop reason: HOSPADM

## 2021-05-26 RX ORDER — PANTOPRAZOLE SODIUM 20 MG/1
20 TABLET, DELAYED RELEASE ORAL
Status: DISCONTINUED | OUTPATIENT
Start: 2021-05-27 | End: 2021-05-28 | Stop reason: HOSPADM

## 2021-05-26 RX ORDER — FENTANYL CITRATE 50 UG/ML
25 INJECTION, SOLUTION INTRAMUSCULAR; INTRAVENOUS
Status: DISCONTINUED | OUTPATIENT
Start: 2021-05-26 | End: 2021-05-26 | Stop reason: HOSPADM

## 2021-05-26 RX ORDER — FACIAL-BODY WIPES
10 EACH TOPICAL DAILY PRN
Status: DISCONTINUED | OUTPATIENT
Start: 2021-05-28 | End: 2021-05-28 | Stop reason: HOSPADM

## 2021-05-26 RX ORDER — ONDANSETRON 4 MG/1
4 TABLET, ORALLY DISINTEGRATING ORAL
Status: DISCONTINUED | OUTPATIENT
Start: 2021-05-26 | End: 2021-05-28 | Stop reason: HOSPADM

## 2021-05-26 RX ORDER — METOPROLOL SUCCINATE 50 MG/1
50 TABLET, EXTENDED RELEASE ORAL DAILY
Status: DISCONTINUED | OUTPATIENT
Start: 2021-05-27 | End: 2021-05-28 | Stop reason: HOSPADM

## 2021-05-26 RX ORDER — NALOXONE HYDROCHLORIDE 0.4 MG/ML
0.4 INJECTION, SOLUTION INTRAMUSCULAR; INTRAVENOUS; SUBCUTANEOUS AS NEEDED
Status: DISCONTINUED | OUTPATIENT
Start: 2021-05-26 | End: 2021-05-28 | Stop reason: HOSPADM

## 2021-05-26 RX ORDER — OXYCODONE HYDROCHLORIDE 5 MG/1
5 TABLET ORAL
Status: DISCONTINUED | OUTPATIENT
Start: 2021-05-26 | End: 2021-05-27

## 2021-05-26 RX ORDER — SODIUM CHLORIDE, SODIUM LACTATE, POTASSIUM CHLORIDE, CALCIUM CHLORIDE 600; 310; 30; 20 MG/100ML; MG/100ML; MG/100ML; MG/100ML
INJECTION, SOLUTION INTRAVENOUS
Status: DISCONTINUED | OUTPATIENT
Start: 2021-05-26 | End: 2021-05-26 | Stop reason: HOSPADM

## 2021-05-26 RX ORDER — MIDAZOLAM HYDROCHLORIDE 1 MG/ML
INJECTION, SOLUTION INTRAMUSCULAR; INTRAVENOUS AS NEEDED
Status: DISCONTINUED | OUTPATIENT
Start: 2021-05-26 | End: 2021-05-26 | Stop reason: HOSPADM

## 2021-05-26 RX ORDER — DIPHENHYDRAMINE HCL 25 MG
25 CAPSULE ORAL
Status: DISCONTINUED | OUTPATIENT
Start: 2021-05-26 | End: 2021-05-28 | Stop reason: HOSPADM

## 2021-05-26 RX ORDER — ASPIRIN 325 MG
325 TABLET, DELAYED RELEASE (ENTERIC COATED) ORAL 2 TIMES DAILY
Status: DISCONTINUED | OUTPATIENT
Start: 2021-05-26 | End: 2021-05-28 | Stop reason: HOSPADM

## 2021-05-26 RX ORDER — DULOXETIN HYDROCHLORIDE 20 MG/1
20 CAPSULE, DELAYED RELEASE ORAL DAILY
Status: DISCONTINUED | OUTPATIENT
Start: 2021-05-27 | End: 2021-05-28 | Stop reason: HOSPADM

## 2021-05-26 RX ORDER — ONDANSETRON 2 MG/ML
4 INJECTION INTRAMUSCULAR; INTRAVENOUS AS NEEDED
Status: DISCONTINUED | OUTPATIENT
Start: 2021-05-26 | End: 2021-05-26 | Stop reason: HOSPADM

## 2021-05-26 RX ORDER — SODIUM CHLORIDE 9 MG/ML
125 INJECTION, SOLUTION INTRAVENOUS CONTINUOUS
Status: DISPENSED | OUTPATIENT
Start: 2021-05-26 | End: 2021-05-27

## 2021-05-26 RX ORDER — HYDROMORPHONE HYDROCHLORIDE 1 MG/ML
0.2 INJECTION, SOLUTION INTRAMUSCULAR; INTRAVENOUS; SUBCUTANEOUS
Status: DISCONTINUED | OUTPATIENT
Start: 2021-05-26 | End: 2021-05-26 | Stop reason: HOSPADM

## 2021-05-26 RX ORDER — POLYETHYLENE GLYCOL 3350 17 G/17G
17 POWDER, FOR SOLUTION ORAL DAILY
Status: DISCONTINUED | OUTPATIENT
Start: 2021-05-27 | End: 2021-05-28 | Stop reason: HOSPADM

## 2021-05-26 RX ORDER — CELECOXIB 200 MG/1
200 CAPSULE ORAL ONCE
Status: COMPLETED | OUTPATIENT
Start: 2021-05-26 | End: 2021-05-26

## 2021-05-26 RX ORDER — SODIUM CHLORIDE 0.9 % (FLUSH) 0.9 %
5-40 SYRINGE (ML) INJECTION EVERY 8 HOURS
Status: DISCONTINUED | OUTPATIENT
Start: 2021-05-26 | End: 2021-05-28 | Stop reason: HOSPADM

## 2021-05-26 RX ORDER — CELECOXIB 200 MG/1
200 CAPSULE ORAL 2 TIMES DAILY
Status: DISCONTINUED | OUTPATIENT
Start: 2021-05-26 | End: 2021-05-28 | Stop reason: HOSPADM

## 2021-05-26 RX ADMIN — SODIUM CHLORIDE 125 ML/HR: 9 INJECTION, SOLUTION INTRAVENOUS at 21:58

## 2021-05-26 RX ADMIN — MIDAZOLAM 1 MG: 1 INJECTION INTRAMUSCULAR; INTRAVENOUS at 09:59

## 2021-05-26 RX ADMIN — SODIUM CHLORIDE, POTASSIUM CHLORIDE, SODIUM LACTATE AND CALCIUM CHLORIDE 50 ML/HR: 600; 310; 30; 20 INJECTION, SOLUTION INTRAVENOUS at 09:38

## 2021-05-26 RX ADMIN — BUPIVACAINE HYDROCHLORIDE 8 MG: 5 INJECTION, SOLUTION EPIDURAL; INTRACAUDAL; PERINEURAL at 10:11

## 2021-05-26 RX ADMIN — VANCOMYCIN HYDROCHLORIDE 1000 MG: 1 INJECTION, POWDER, LYOPHILIZED, FOR SOLUTION INTRAVENOUS at 09:44

## 2021-05-26 RX ADMIN — SODIUM CHLORIDE 125 ML/HR: 9 INJECTION, SOLUTION INTRAVENOUS at 15:09

## 2021-05-26 RX ADMIN — CELECOXIB 200 MG: 200 CAPSULE ORAL at 09:36

## 2021-05-26 RX ADMIN — SODIUM CHLORIDE: 900 INJECTION, SOLUTION INTRAVENOUS at 12:52

## 2021-05-26 RX ADMIN — VANCOMYCIN HYDROCHLORIDE 1000 MG: 1 INJECTION, POWDER, LYOPHILIZED, FOR SOLUTION INTRAVENOUS at 21:57

## 2021-05-26 RX ADMIN — TRANEXAMIC ACID 1 G: 100 INJECTION, SOLUTION INTRAVENOUS at 10:27

## 2021-05-26 RX ADMIN — ACETAMINOPHEN 650 MG: 325 TABLET ORAL at 21:57

## 2021-05-26 RX ADMIN — DEXAMETHASONE SODIUM PHOSPHATE 4 MG: 4 INJECTION, SOLUTION INTRAMUSCULAR; INTRAVENOUS at 10:35

## 2021-05-26 RX ADMIN — PROPOFOL 20 MG: 10 INJECTION, EMULSION INTRAVENOUS at 10:04

## 2021-05-26 RX ADMIN — SODIUM CHLORIDE, POTASSIUM CHLORIDE, SODIUM LACTATE AND CALCIUM CHLORIDE: 600; 310; 30; 20 INJECTION, SOLUTION INTRAVENOUS at 09:49

## 2021-05-26 RX ADMIN — ACETAMINOPHEN 650 MG: 325 TABLET ORAL at 15:08

## 2021-05-26 RX ADMIN — PHENYLEPHRINE HYDROCHLORIDE 30 MCG/MIN: 10 INJECTION INTRAVENOUS at 10:15

## 2021-05-26 RX ADMIN — ONDANSETRON HYDROCHLORIDE 4 MG: 2 INJECTION, SOLUTION INTRAMUSCULAR; INTRAVENOUS at 10:35

## 2021-05-26 RX ADMIN — CELECOXIB 200 MG: 200 CAPSULE ORAL at 21:57

## 2021-05-26 RX ADMIN — PROPOFOL 50 MCG/KG/MIN: 10 INJECTION, EMULSION INTRAVENOUS at 10:04

## 2021-05-26 RX ADMIN — MIDAZOLAM 1 MG: 1 INJECTION INTRAMUSCULAR; INTRAVENOUS at 10:02

## 2021-05-26 RX ADMIN — ASPIRIN 325 MG: 325 TABLET, COATED ORAL at 17:26

## 2021-05-26 NOTE — BRIEF OP NOTE
Brief Postoperative Note    Patient: Mojgan Schulte  YOB: 1940  MRN: [id number]    Date of Procedure: 5/26/2021     Pre-Op Diagnosis: DJD LEFT HIP    Post-Op Diagnosis: Same as preoperative diagnosis. Procedure(s):  LEFT TOTAL HIP ARTHROPLASTY    Surgeon(s):  Suzy Sy MD    Surgical Assistant: Physician Assistant: Marsha Jackson PA-C  Surg Asst-1: Poppy Ford    Anesthesia: Spinal     Estimated Blood Loss (mL): 565     Complications: None    Specimens: * No specimens in log *     Implants:   Implant Name Type Inv.  Item Serial No.  Lot No. LRB No. Used Action   CUP ACET [id number] HIP GRIPTION ALEXA CEMENTLESS FIX SECT SER - SNA  CUP ACET [id number] HIP GRIPTION ALEXA CEMENTLESS FIX SECT SER NA [facility] [id number] Left 1 Implanted   SCREW BNE L20MM DIA6.5MM CANC HIP S STL GRIPTION FULL THRD - SNA  SCREW BNE L20MM DIA6.5MM CANC HIP S STL GRIPTION FULL THRD NA [facility] [id number] Left 1 Implanted   LINER ACET OD52MM ID36MM HIP ALTRX PINN - SN/A  LINER ACET OD52MM ID36MM HIP ALTRX PINN N/A [facility] [id number] Left 1 Implanted   STEM FEM SZ 5 L145MM 130DEG BILAT HIP TI ALLY PORCOAT STD - SN/A  STEM FEM SZ 5 L145MM 130DEG BILAT HIP TI ALLY PORCOAT STD N/A [facility] [id number] Left 1 Implanted   HEAD FEM [id number] +1.5MM OFFSET 12/14 TAPR HIP CERAMIC - SN/A  HEAD FEM [id number] +1.5MM OFFSET 12/14 TAPR HIP CERAMIC N/A [facility] [id number] Left 1 Implanted       Drains: * No LDAs found *    Findings: DJD left hip    Electronically Signed by Janeth Zhu PA-C on 5/26/2021 at 1:02 PM

## 2021-05-26 NOTE — PROGRESS NOTES
Primary Nurse Mich Abreu and Eder Alonzo, RN performed a dual skin assessment on this patient No impairment noted  Delta score is 21

## 2021-05-26 NOTE — ANESTHESIA PROCEDURE NOTES
Spinal Block    Start time: 5/26/2021 10:04 AM  End time: 5/26/2021 10:11 AM  Performed by: Michele Washburn CRNA  Authorized by: Lilia Le MD     Pre-procedure:   Indications: at surgeon's request, post-op pain management and procedure for pain  Preanesthetic Checklist: patient identified, risks and benefits discussed, anesthesia consent, site marked, patient being monitored and timeout performed      Spinal Block:   Patient Position:  Seated  Prep Region:  Lumbar  Prep: DuraPrep      Location:  L4-5  Technique:  Single shot        Needle:   Needle Type:  Pencan  Needle Gauge:  24 G  Attempts:  1      Events: CSF confirmed, no blood with aspiration and no paresthesia        Assessment:  Insertion:  Uncomplicated  Patient tolerance:  Patient tolerated the procedure well with no immediate complications

## 2021-05-26 NOTE — ANESTHESIA POSTPROCEDURE EVALUATION
Post-Anesthesia Evaluation and Assessment    Patient: Lindsay Perez MRN: [id number]  SSN: [id number]    YOB: 1940  Age: 78 y.o. Sex: female       Cardiovascular Function/Vital Signs  Visit Vitals  /66   Pulse 62   Temp 36.6 °C (97.8 °F)   Resp 16   Ht 5' 6\" (1.676 m)   Wt 75.2 kg (165 lb 12.6 oz)   SpO2 100%   BMI 26.76 kg/m²       Patient is status post Spinal anesthesia for Procedure(s):  LEFT TOTAL HIP ARTHROPLASTY. Nausea/Vomiting: None    Postoperative hydration reviewed and adequate. Pain:  Pain Scale 1: Numeric (0 - 10) (05/26/21 1405)  Pain Intensity 1: 0 (05/26/21 1405)   Managed    Neurological Status:   Neuro (WDL): Exceptions to WDL (05/26/21 1405)  Neuro  LUE Motor Response: Purposeful (05/26/21 1309)  LLE Motor Response: Numbness (05/26/21 1405)  RUE Motor Response: Purposeful (05/26/21 1309)  RLE Motor Response: Numbness (05/26/21 1405)   At baseline    Mental Status and Level of Consciousness: Alert and oriented to person, place, and time    Pulmonary Status:   O2 Device: Nasal cannula (05/26/21 1405)   Adequate oxygenation and airway patent    Complications related to anesthesia: None    Post-anesthesia assessment completed. No concerns    Signed By: Debora Anders MD     May 26, 2021              Procedure(s):  LEFT TOTAL HIP ARTHROPLASTY. MAC, spinal    <[email or web address]>    INITIAL Post-op Vital signs:   Vitals Value Taken Time   /66 05/26/21 1400   Temp 36.6 °C (97.8 °F) 05/26/21 1309   Pulse 63 05/26/21 1414   Resp 16 05/26/21 1414   SpO2 100 % 05/26/21 1414   Vitals shown include unvalidated device data.

## 2021-05-26 NOTE — PERIOP NOTES
Called family contact [address] [place] [address] at [phone] and updated on progress of procedure.

## 2021-05-26 NOTE — PROGRESS NOTES
TRANSFER - IN REPORT:    Verbal report received from Kostas Jules RN(name) on [facility]  being received from [employer]) for routine post - op      Report consisted of patients Situation, Background, Assessment and   Recommendations(SBAR). Information from the following report(s) SBAR, OR Summary, Intake/Output, MAR and Accordion was reviewed with the receiving nurse. Opportunity for questions and clarification was provided. Assessment completed upon patients arrival to unit and care assumed.

## 2021-05-26 NOTE — ANESTHESIA PREPROCEDURE EVALUATION
Relevant Problems   No relevant active problems       Anesthetic History   No history of anesthetic complications            Review of Systems / Medical History  Patient summary reviewed, nursing notes reviewed and pertinent labs reviewed    Pulmonary  Within defined limits                 Neuro/Psych   Within defined limits           Cardiovascular    Hypertension                   GI/Hepatic/Renal     GERD           Endo/Other        Arthritis     Other Findings              Physical Exam    Airway  Mallampati: II  TM Distance: > 6 cm  Neck ROM: normal range of motion   Mouth opening: Normal     Cardiovascular  Regular rate and rhythm,  S1 and S2 normal,  no murmur, click, rub, or gallop             Dental  No notable dental hx       Pulmonary  Breath sounds clear to auscultation               Abdominal  GI exam deferred       Other Findings            Anesthetic Plan    ASA: 2  Anesthesia type: MAC and spinal          Induction: Intravenous  Anesthetic plan and risks discussed with: Patient

## 2021-05-26 NOTE — PROGRESS NOTES
Ortho Post-Op Note    5/26/2021  3:50 PM    POD:  Day of Surgery  S/P:  Procedure(s):  LEFT TOTAL HIP ARTHROPLASTY    Afebrile/VSS, NAD, A&O x 3  Doing well without complaints of nausea  Pain well controlled  Calves soft/NTTP Bilaterally  Thigh soft. Dressing clean and dry  Not moving lower extremities well, spinal remains effective  Neurocirculatory exam intact and within normal range. Lab Results   Component Value Date/Time    HGB 12.4 05/20/2021 12:05 PM    INR 1.0 05/20/2021 12:05 PM     Recent Labs     05/20/21  1205 10/01/20  1405   CREA 0.41* 0.64   BUN 11 12     Estimated Creatinine Clearance: 63.2 mL/min (A) (by C-G formula based on SCr of 0.41 mg/dL (L)).   Visit Vitals  BP (!) 147/70 (BP 1 Location: Left upper arm, BP Patient Position: At rest)   Pulse 68   Temp 97.7 °F (36.5 °C)   Resp 16   Ht 5' 6\" (1.676 m)   Wt 75.2 kg (165 lb 12.6 oz)   SpO2 99%   BMI 26.76 kg/m²       PLAN:  DVT prophylaxis:  mg bid  50% WB with PT-mobilization  Pain Control: Tylenol, celebrex, oxycodone  Plan to D/C home in 1-2 days      Marisa Pena, [address] [facility], [address] [facility]   Department of Orthopaedics  [phone]

## 2021-05-26 NOTE — OP NOTES
[address]   OPERATIVE REPORT    Name:  Malu Richard  MR#:  [id number]  :  1940  ACCOUNT #:  [de-identified]  DATE OF SERVICE:  2021    PREOPERATIVE DIAGNOSIS:  Advanced degenerative arthritis of left hip. POSTOPERATIVE DIAGNOSIS:  Advanced degenerative arthritis of left hip. PROCEDURE PERFORMED:  Left total hip replacement. SURGEON:  Edwin Thorpe MD    ASSISTANT:  Dany Mcfarland PA-C    ANESTHESIA:  Spinal.    COMPLICATIONS:  None. SPECIMENS REMOVED:  Right femoral head. IMPLANTS:  Left total hip components as listed in operative report. ESTIMATED BLOOD LOSS:  200 mL. DRAINS:  None. INDICATIONS:  The patient has a long history of pain in her left hip. She has advanced degenerative arthritis and now presents for the above procedure. PROCEDURE:  On day of operation, the patient was taken to the operating room. Spinal anesthesia administered. Consent confirmed. Antibiotics were given. She was placed in the right lateral decubitus position and positioned with [facility] positioner. The left hip was prepped and draped in the usual fashion. A mini posterolateral incision was made over the hip, it was carried through subcutaneous tissue. Tensor fascia sydney was sharply divided. Fascia of the gluteal muscles divided in line with their fibers. External rotator muscles were taken down. A T-shaped posterior capsular incision made. Advanced degenerative changes were noted throughout the hip. The hip was dislocated. Oscillating saw was used to make the femoral neck osteotomy. Retractors were carefully placed around the acetabulum. Acetabulum was then debrided of soft tissue and osteophytes. Acetabulum was then reamed to 51-mm and felt to have good coverage and good bone quality at this point. A 52-mm [place] [employer] acetabular component was press fit into place and felt to have a tight fit.   One superior screw was placed and felt to be secure. 36-mm trial liner placed in the acetabulum. Attention was then turned to the femur. Box osteotome was utilized. Femur was reamed to a #5 in the Attune system. It was broached to #5 and felt to have a tight fit. Various head and neck trials were utilized, 1.5 standard provided the best fit, range of motion, with proper stability in all planes of motion. The leg lengths were felt to be appropriate. Trial components were then removed. The incision was thoroughly irrigated with pulse irrigation as well as antibiotic solution. The 36-mm polyethylene liner was placed in the acetabulum and felt to be secure. Attention was turned to the femur. The femur was sterilely irrigated with pulse irrigation as well as antibiotic solution. The #5 standard [place] [employer] femoral stem was press fit into place and felt to have a tight fit. Various head and neck trials were utilized, 1.5 x 36 provided the best fit, range of motion, with proper stability in all planes of motion. Some anterior tissue was removed anteriorly to prevent impingement. Trial components were then removed. The 36-mm polyethylene liner placed in the acetabulum. Attention was then turned to the femur. Femur was thoroughly irrigated with pulse irrigation as well as antibiotic solution. The standard [place] #5 femoral stem was press fit into place and felt to have a tight fit. The x-rays revealed proper position of the components. The 36 x 1.5 ceramic head was introduced and reduced. It was felt to be stable. Posterior capsule repaired with #1 Vicryl suture. Fascia closed with a combination of #2 and #1 Vicryl, supplemented with 2-0 PDS, 2-0 Vicryl was used to close subcutaneous tissue, and staples used to close the skin. Sterile dressings applied. The patient taken to recovery room in satisfactory condition.     The assistant, Lord Maira PA-C, assisted me with positioning, retraction, implant installation, and incision closure.         Viki Perez MD [id number]  D:  05/26/2021 14:18  T:  05/26/2021 18:26  JOB #:  [id number]

## 2021-05-26 NOTE — PERIOP NOTES
TRANSFER - OUT REPORT:    Verbal report given to Urbano(name) on Janneth Abrams  being transferred to 564(unit) for routine post - op       Report consisted of patients Situation, Background, Assessment and   Recommendations(SBAR). Time Pre op antibiotic [id number]  Anesthesia Stop time: 0291  Hull Present on Transfer to floor:no  Order for Hull on Chart:no  Discharge Prescriptions with Chart:no    Information from the following report(s) SBAR, Kardex, OR Summary, Procedure Summary, Intake/Output, MAR, Recent Results and Med Rec Status was reviewed with the receiving nurse. Opportunity for questions and clarification was provided. Is the patient on 02? YES       L/Min 2       Other     Is the patient on a monitor? NO    Is the nurse transporting with the patient? NO    Surgical Waiting Area notified of patient's transfer from PACU? YES      The following personal items collected during your admission accompanied patient upon transfer:   Dental Appliance:    Vision:    Hearing Aid:    Jewelry:    Clothing: Clothing: Other (comment) (clothing bag to pacu)  Other Valuables:    Valuables sent to safe:      Clothes sent to floor.

## 2021-05-27 PROBLEM — I48.0 PAROXYSMAL ATRIAL FIBRILLATION (HCC): Status: ACTIVE | Noted: 2021-05-27

## 2021-05-27 LAB
ANION GAP SERPL CALC-SCNC: 8 MMOL/L (ref 5–15)
BUN SERPL-MCNC: 12 MG/DL (ref 6–20)
BUN/CREAT SERPL: 29 (ref 12–20)
CALCIUM SERPL-MCNC: 8.9 MG/DL (ref 8.5–10.1)
CHLORIDE SERPL-SCNC: 106 MMOL/L (ref 97–108)
CO2 SERPL-SCNC: 25 MMOL/L (ref 21–32)
CREAT SERPL-MCNC: 0.41 MG/DL (ref 0.55–1.02)
GLUCOSE SERPL-MCNC: 113 MG/DL (ref 65–100)
HGB BLD-MCNC: 9.9 G/DL (ref 11.5–16)
POTASSIUM SERPL-SCNC: 3.6 MMOL/L (ref 3.5–5.1)
SODIUM SERPL-SCNC: 139 MMOL/L (ref 136–145)

## 2021-05-27 PROCEDURE — 97116 GAIT TRAINING THERAPY: CPT

## 2021-05-27 PROCEDURE — 97165 OT EVAL LOW COMPLEX 30 MIN: CPT

## 2021-05-27 PROCEDURE — 74011250637 HC RX REV CODE- 250/637: Performed by: PHYSICIAN ASSISTANT

## 2021-05-27 PROCEDURE — 74011250636 HC RX REV CODE- 250/636: Performed by: PHYSICIAN ASSISTANT

## 2021-05-27 PROCEDURE — 85018 HEMOGLOBIN: CPT

## 2021-05-27 PROCEDURE — 97535 SELF CARE MNGMENT TRAINING: CPT

## 2021-05-27 PROCEDURE — 97530 THERAPEUTIC ACTIVITIES: CPT

## 2021-05-27 PROCEDURE — 36415 COLL VENOUS BLD VENIPUNCTURE: CPT

## 2021-05-27 PROCEDURE — 97110 THERAPEUTIC EXERCISES: CPT

## 2021-05-27 PROCEDURE — 80048 BASIC METABOLIC PNL TOTAL CA: CPT

## 2021-05-27 RX ORDER — ASPIRIN 325 MG
325 TABLET ORAL 2 TIMES DAILY
Qty: 60 TABLET | Refills: 0 | Status: SHIPPED | OUTPATIENT
Start: 2021-05-27 | End: 2021-06-26

## 2021-05-27 RX ORDER — HYDROCODONE BITARTRATE AND ACETAMINOPHEN 5; 325 MG/1; MG/1
1 TABLET ORAL
Qty: 40 TABLET | Refills: 0 | Status: SHIPPED | OUTPATIENT
Start: 2021-05-27 | End: 2021-06-03

## 2021-05-27 RX ORDER — HYDROCODONE BITARTRATE AND ACETAMINOPHEN 5; 325 MG/1; MG/1
1 TABLET ORAL
Status: DISCONTINUED | OUTPATIENT
Start: 2021-05-27 | End: 2021-05-28 | Stop reason: HOSPADM

## 2021-05-27 RX ADMIN — ACETAMINOPHEN 650 MG: 325 TABLET ORAL at 08:49

## 2021-05-27 RX ADMIN — PANTOPRAZOLE SODIUM 20 MG: 20 TABLET, DELAYED RELEASE ORAL at 07:24

## 2021-05-27 RX ADMIN — METOPROLOL SUCCINATE 50 MG: 50 TABLET, EXTENDED RELEASE ORAL at 08:49

## 2021-05-27 RX ADMIN — Medication 10 ML: at 13:30

## 2021-05-27 RX ADMIN — DOCUSATE SODIUM 50 MG AND SENNOSIDES 8.6 MG 1 TABLET: 8.6; 5 TABLET, FILM COATED ORAL at 08:48

## 2021-05-27 RX ADMIN — ACETAMINOPHEN 650 MG: 325 TABLET ORAL at 22:18

## 2021-05-27 RX ADMIN — CELECOXIB 200 MG: 200 CAPSULE ORAL at 18:14

## 2021-05-27 RX ADMIN — DULOXETINE HYDROCHLORIDE 20 MG: 20 CAPSULE, DELAYED RELEASE ORAL at 08:48

## 2021-05-27 RX ADMIN — DOCUSATE SODIUM 50 MG AND SENNOSIDES 8.6 MG 1 TABLET: 8.6; 5 TABLET, FILM COATED ORAL at 18:14

## 2021-05-27 RX ADMIN — SODIUM CHLORIDE 125 ML/HR: 9 INJECTION, SOLUTION INTRAVENOUS at 07:25

## 2021-05-27 RX ADMIN — ASPIRIN 325 MG: 325 TABLET, COATED ORAL at 18:14

## 2021-05-27 RX ADMIN — ACETAMINOPHEN 650 MG: 325 TABLET ORAL at 14:58

## 2021-05-27 RX ADMIN — ACETAMINOPHEN 650 MG: 325 TABLET ORAL at 03:15

## 2021-05-27 RX ADMIN — ASPIRIN 325 MG: 325 TABLET, COATED ORAL at 08:48

## 2021-05-27 RX ADMIN — CELECOXIB 200 MG: 200 CAPSULE ORAL at 08:49

## 2021-05-27 NOTE — DISCHARGE INSTRUCTIONS
DC Orders All Total Hips    Case Management for DC planning to Home HC .   - PT 2 times a week for 2 weeks; 50% WBAT with AVOID sudden and extreme movement of your hip (surgical leg). -  mg BID for 30 days post-operatively. .              - Remove AQUACEL bandage on the 7th day post-operatively (PLEASE reference discharge instructions INCISION CARE for additional information). - Remove staples at 2 weeks post-op; 6/9/21 .   - Follow up in Office in 2 weeks. After Hospital Care Plan:  Discharge Instructions Hip Replacement-Dr. Charline Calvin    Patient Name: Naveed Diaz  Date of procedure: 5/26/2021    Procedure: Procedure(s):  LEFT TOTAL HIP ARTHROPLASTY  Surgeon: Carmella Sanchez) and Role:     Geoff Tinoco MD - Primary     PCP: Margot Atkins MD  Date of discharge: No discharge date for patient encounter. Follow up appointments   Follow up with Dr. Charline Calvin in 2 weeks. Call [phone] to make an appointment.  If home health has been arranged for you the agency will contact you to arrange dates/times for visits. Please call them if you do not hear from them within 24 hours after you are discharged    When to call your Orthopaedic Surgeon: Call [phone].  If you call after 5pm or on a weekend, the on call physician will be contacted   Increased hip pain, unrelieved pain or if you have difficulty or are unable to walk   Signs of infection-if your incision is red; continues to have drainage; drainage has a foul odor or if you have a persistent fever over 101 degrees   Signs of a blood clot in your leg-calf pain, tenderness, redness, swelling of lower leg    When to call your Primary Care Physician:   Concerns about medical conditions such as diabetes, high blood pressure, asthma, congestive heart failure   Call if blood sugars are elevated, persistent headache or dizziness, coughing or congestion, constipation or diarrhea, burning with urination, abnormal heart rate    When to call 911and go to the nearest emergency room   acute onset of chest pain, shortness of breath, difficulty breathing    Activity   50% weight bearing with walker or crutches for 2 weeks, then as tolerated. Refer to your handbook for instructions and pictures   Complete you Home Exercise Program daily as instructed by your therapist. Refer to your handbook for instructions and pictures   Get up every one hour and walk (except at night when sleeping)   AVOID sudden and extreme movement of your hip (surgical leg)   Do not drive or operate heavy machinery    Incision Care   The Aquacel (brown, waterproof) surgical dressing is to remain on your hip for 7 days. On the 7th day have someone gently peel the dressing off by carefully lifting the edge and stretching it slightly to break the adhesive seal   If the home health agency has not removed the Aquacel bandage by the 7th day please remove it yourself   You will have staples in your hip incision. They will be removed by the home health agency staff   If your Aquacel dressing comes loose/off before the 7th day, you may replace it with a dry sterile gauze dressing; change it daily. Once your incision is not draining, you may leave it open to air   You may take a shower with the Aquacel dressing in place. Once the Aquacel is removed, you may shower and get your incision wet but do not submerge your incision under water in a bath tub, hot tub or swimming pool for 6 weeks after surgery. Preventing blood clots   Take  mg BID for 30 days post-operatively.  Wear elastic stockings (TEDS) for 4 weeks.   You should remove them for approximately 1 hour daily for showering/sponge bathing    Pain management   Keep ice wrap in place except when walking; changing gel packs every 4 hours   Lie down and elevate your leg on pillows for about 30 minutes after walking to decrease swelling and pain    Do ankle pumps (10 repetitions) every hour while awake and get up frequently to walk    Take Tylenol 500mg every 6 hours for 2 weeks    Take narcotic pain pill as prescribed if needed. Take with food; avoid alcohol while taking pain medication. Decrease the amount of narcotic pain medication as your pain lessens     Diet   Resume usual diet; drink plenty of fluids; eat foods high in fiber   You may want to take a stool softener (such as Senokot-S or Colace) to prevent constipation while you are taking pain medication.   If constipation occurs, take a laxative (such as Dulcolax tablets, Milk of Magnesia, or a suppository)

## 2021-05-27 NOTE — PROGRESS NOTES
Problem: Self Care Deficits Care Plan (Adult)  Goal: *Acute Goals and Plan of Care (Insert Text)  Description: FUNCTIONAL STATUS PRIOR TO ADMISSION: Pt reports residing in single story home with 4 KELLY and pt reporting she was Mod Independent with ADLs at , using various long-handled AE for distal LE management. 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 HOME SUPPORT: The patient lived with sister and was her primary caregiver; will have another sister staying with her and PRN assist from niece. Occupational Therapy Goals  Initiated 5/27/2021  1. Patient will perform lower body ADLs with AE supervision/set-up within 4 day(s). 2.  Patient will perform upper body ADLs standing 5 mins without fatigue or LOB with supervision/set-up within 4 day(s). 3.  Patient will perform toilet transfer with supervision/set-up within 4 day(s). 4.  Patient will perform all aspects of toileting with supervision/set-up within 4 day(s). 5.  Patient will participate in upper extremity therapeutic exercise/activities with supervision/set-up for 10 minutes within 4 day(s). 6.  Patient will utilize energy conservation techniques during functional activities without cues within 4 day(s). Outcome: Not Met    OCCUPATIONAL THERAPY EVALUATION  Patient: Joaquín Tripp (5 y.o. female)  Date: 5/27/2021  Primary Diagnosis: Primary localized osteoarthritis of left hip [M16.12]  Procedure(s) (LRB):  LEFT TOTAL HIP ARTHROPLASTY (Left) 1 Day Post-Op   Precautions:  PWB (50% Left leg)    ASSESSMENT  Based on the objective data described below, the patient presents with decreased higher level mobility/balance, decreased higher level activity tolerance, decreased distal LE ADL management, LLE numbness and c/o L hip pain, all of which limit pt's ability to complete self-care routine at level congruent with PLOF.   Currently, pt benefits from Antelope for self-feeding, S/U for seated grooming and UB dressing and Min A for bathing/LE dressing/toileting. Pt verbalized good understanding of modified dressing techniques, as well as, energy conservation technique of completing all distal dressing prior to standing one for proximal management. Pt reports all DME/AE needs fulfilled. OT educated pt on various home modifications to assist with bed mobility, with emphasis on low profile box spring and bed rails, with good understanding verbalized and pt stating she would independently follow in acquisition. Pt benefits from skilled OT to address functional deficits during acute hospitalization, with reporting therapist anticipating no future OT needs once discharged, with pt declining need for [facility]. Current Level of Function Impacting Discharge (ADLs/self-care): Antelope for self-feeding, S/U for seated grooming and UB dressing and Min A for bathing/LE dressing/toileting    Functional Outcome Measure: The patient scored 55/100 on the Barthel Index outcome measure. 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 PLAN :  Recommendations and Planned Interventions: self care training, functional mobility training, therapeutic exercise, balance training, therapeutic activities, endurance activities, and patient education    Frequency/Duration: Patient will be followed by occupational therapy 5 times a week to address goals. Recommendation for discharge: (in order for the patient to meet his/her long term goals)  No skilled occupational therapy/ follow up rehabilitation needs identified at this time.     This discharge recommendation:  Has been made in collaboration with the attending provider and/or case management    IF patient discharges home will need the following DME: patient owns DME required for discharge       SUBJECTIVE:   Patient stated you've taught me a good amount today. Thanks.     OBJECTIVE DATA SUMMARY:   HISTORY:   Past Medical History:   Diagnosis Date    Arthritis     DDD BACK PAIN    Chest pain, atypical     Chronic pain     HIP    Diverticulitis of colon     Fibrocystic disease of breast     GERD (gastroesophageal reflux disease)     Hypercholesterolemia     Hypertension     IBS (irritable bowel syndrome)     Menopause 1968    Neuropathy     Ovarian cyst      Past Surgical History:   Procedure Laterality Date    ENDOSCOPY, COLON, DIAGNOSTIC  10/22/2010    5 yr fu, [facility]    HX APPENDECTOMY      HX COLONOSCOPY  10/20/2016    Dr. Elijah Rueda - 5 yr f/u    HX DILATION AND CURETTAGE      HX TONSILLECTOMY         Expanded or extensive additional review of patient history:     Home Situation  Home Environment: Private residence  # Steps to Enter: 4 (down)  One/Two Story Residence: One story  Living Alone: Yes  Support Systems: Family member(s)  Patient Expects to be Discharged to[de-identified] Private residence  Current DME Used/Available at Home: Cane, straight, Walker, rolling, Raised toilet seat, Grab bars  Tub or Shower Type: Shower (with bench present)    Hand dominance: Right    EXAMINATION OF PERFORMANCE DEFICITS:  Cognitive/Behavioral Status:  Neurologic State: Alert  Orientation Level: Oriented X4  Cognition: Appropriate decision making; Appropriate for age attention/concentration; Appropriate safety awareness  Perception: Appears intact  Perseveration: No perseveration noted  Safety/Judgement: Awareness of environment;Home safety; Insight into deficits    Hearing: Auditory  Auditory Impairment: None    Range of Motion:  AROM: Within functional limits (aside from LLE)    Strength:  Strength: Within functional limits (aside from LLE)    Coordination:  Coordination: Within functional limits (aside from LLE)  Fine Motor Skills-Upper: Left Intact; Right Intact    Gross Motor Skills-Upper: Left Intact; Right Intact    Tone & Sensation:  Tone: Normal  Sensation: Impaired (LLE neuropathy reported)    Balance:  Sitting: Intact; Without support  Standing: With support; Impaired  Standing - Static: Good;Constant support  Standing - Dynamic : Fair;Constant support    Functional Mobility and Transfers for ADLs:  Bed Mobility:  Supine to Sit: Stand-by assistance; Additional time;Bed Modified (HOB elevated - use of bed rail x 1)  Scooting: Stand-by assistance    Transfers:  Sit to Stand: Minimum assistance;Assist x1 (to stabalize left ankle)  Stand to Sit: Contact guard assistance    ADL Assessment:  Patient recalled and demonstrated avoiding extreme planes of movement with Left LE during ADLs and functional mobility with Min verbal cues. Feeding: Independent    Oral Facial Hygiene/Grooming: Setup (seated)    Bathing: Minimum assistance    Upper Body Dressing: Setup    Lower Body Dressing: Minimum assistance    Toileting: Minimum assistance    ADL Intervention and task modifications:  Patient instructed and indicated understanding the benefits of maintaining activity tolerance, functional mobility, and independence with self care tasks during acute stay  to ensure safe return home and to baseline. Encouraged patient to increase frequency and duration OOB, be out of bed for all meals, perform daily ADLs (as approved by RN/MD regarding bathing etc), and performing functional mobility to/from bathroom. Pt educated on safe transfer techniques, with specific emphasis on proper hand placement to push up from seated surface rather than attempt to pull self up, fully positioning self in-front of desired seated location, feeling chair on back of legs and reaching back with 1-2 UE to slowly lower self to seated position. Cognitive Retraining  Safety/Judgement: Awareness of environment;Home safety; Insight into deficits    Bathing: Patient instructed when bathing to not submerge wound in water, stand to shower or sponge bathe, cover wound with plastic and tape to ensure no water reaches bandage/wound without cues. Patient indicated understanding. Dressing joint: Patient instructed and demonstrated to don/doff Left LE first/last Min cues. Patient instructed and demonstrated to don all clothing while sitting prior to standing, doff all clothing to knees while standing, then sit to doff clothing off from knees to feet in order to facilitate fall prevention, pain management, and energy conservation with Minimum assistance. Home safety: Patient instructed on home modifications and safety (raise height of ADL objects, appropriate height of chair surfaces, recliner safety, change of floor surfaces, clear pathways) to increase independence and fall prevention. Patient indicated understanding. Standing: Patient instructed and demonstrated to walk up to sink/counter top/surfaces, step into walker to increase safety of joint and fall prevention with Contact guard assistance. Instructed to apply concept of hip contraindications to ADLs within the home (no extreme movements/50% weight bearing in LLE, square off while using objects, slide objects along surfaces). Patient instructed to increase amount of time standing, observe standing position during ADLs in order to increase even weight bearing through bilateral LEs in order to increase independence with ADLs. Goal to be reached 30 days post - op, per orthopedic surgeon or per PT. Patient indicated understanding. Shower transfer: Patient instructed regarding when it is safe to begin transfer into tub (complete stairs with PT, advance exercises with PT high enough to clear tub height). Patient instructed to use the same technique as used with stairs when entering and exiting tub (\"up with the non-surgical, down with the surgical leg\"). Patient indicated understanding.     Functional Measure:  Barthel Index:    Bathin  Bladder: 10  Bowels: 10  Groomin  Dressing: 5  Feeding: 10  Mobility: 0  Stairs: 0  Toilet Use: 5  Transfer (Bed to Chair and Back): 10  Total: 55/100        The Barthel ADL Index: Guidelines  1. The index should be used as a record of what a patient does, not as a record of what a patient could do. 2. The main aim is to establish degree of independence from any help, physical or verbal, however minor and for whatever reason. 3. The need for supervision renders the patient not independent. 4. A patient's performance should be established using the best available evidence. Asking the patient, friends/relatives and nurses are the usual sources, but direct observation and common sense are also important. However direct testing is not needed. 5. Usually the patient's performance over the preceding 24-48 hours is important, but occasionally longer periods will be relevant. 6. Middle categories imply that the patient supplies over 50 per cent of the effort. 7. Use of aids to be independent is allowed. Mennie Barthel., Barthel, D.W. ([id number]). Functional evaluation: the Barthel Index. [address] [facility] (14)2. ABI Aguayo, Herb Wright, Penny Rosew., [place], [address] [facility] (1999). Measuring the change indisability after inpatient rehabilitation; comparison of the responsiveness of the Barthel Index and Functional [place] Measure. Journal of Neurology, Neurosurgery, and Psychiatry, 66(4), 790-140. Diandra Coronado, N.J.A, Genny Mcgowan  W.J.SARI, & Codie Puckett MSwethaA. (2004.) Assessment of post-stroke quality of life in cost-effectiveness studies: The usefulness of the Barthel Index and the EuroQoL-5D.  Quality of Life Research, 15, 999-15       Occupational Therapy Evaluation Charge Determination   History Examination Decision-Making   LOW Complexity : Brief history review  HIGH Complexity : 5 or more performance deficits relating to physical, cognitive , or psychosocial skils that result in activity limitations and / or participation restrictions LOW Complexity : No comorbidities that affect functional and no verbal or physical assistance needed to complete eval tasks       Based on the above components, the patient evaluation is determined to be of the following complexity level: LOW   Pain Rating:  Pt with c/o L hip pain during sit to stand and stand to sit, did not quantify; nursing aware and following    Activity Tolerance:   Good, Fair, and requires rest breaks    After treatment patient left in no apparent distress:    Supine in bed, Call bell within reach, and Side rails x 3    COMMUNICATION/EDUCATION:   The patients plan of care was discussed with: Physical therapist, Registered nurse, and Case management. Home safety education was provided and the patient/caregiver indicated understanding., Patient/family have participated as able in goal setting and plan of care. , and Patient/family agree to work toward stated goals and plan of care. This patients plan of care is appropriate for delegation to hospitals.     Thank you for this referral.  Frank Sanchez OT  Time Calculation: 44 mins

## 2021-05-27 NOTE — PROGRESS NOTES
Pt ambulating with PT when she she rolled (inversion) her left ankle. She was able bear weight  She did not fall and complains only of minimal pain. She has slight swelling over the medial malleolus  No instability or TTP over the distal fibula or tibia. The ATFL is intact  Has moderate weakness with PF/DF and with H/O neuropathy.   Her motion and strength are slowly improving  Placed ice pack with ACE on the left ankle  Recheck ankle in am

## 2021-05-27 NOTE — PROGRESS NOTES
Bedside and Verbal shift change report given to Marie Hunt RN (oncoming nurse) by Justyna Thompson RN (offgoing nurse). Report included the following information SBAR, Kardex and MAR.

## 2021-05-27 NOTE — PROGRESS NOTES
Pt has long history neuropathy in legs. Last evening pt had numbness and decreased motor left foot. Sensation better today and slightly better dorsiflexion left foot. Hip ok.

## 2021-05-27 NOTE — PROGRESS NOTES
Problem: Mobility Impaired (Adult and Pediatric)  Goal: *Acute Goals and Plan of Care (Insert Text)  Description: FUNCTIONAL STATUS PRIOR TO ADMISSION: Patient was independent and active without use of DME. 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 HOME SUPPORT PRIOR TO ADMISSION: The patient lived with sister - pt primary caregiver to sister. Pt's will have assistance of other family members at discharge, other sisters, niece. Physical Therapy Goals  Initiated 5/26/2021    1. Patient will move from supine to sit and sit to supine  and scoot up and down in bed with modified independence within 4 days. 2. Patient will perform sit to stand with modified independence within 4 days. 3. Patient will ambulate with modified independence for > 150 feet with the least restrictive device within 4 days. 4. Patient will ascend/descend 4 stairs with one handrail(s) with modified independence within 4 days. 5. Patient will perform THR home exercise program per protocol with supervision/set-up within 4 days. 5/27/2021 1339 by Juliane Yanes PT  Outcome: Progressing Towards Goal   PHYSICAL THERAPY TREATMENT  Patient: Kory Sosa (15 y.o. female)  Date: 5/27/2021  Diagnosis: Primary localized osteoarthritis of left hip [M16.12] Primary localized osteoarthritis of left hip  Procedure(s) (LRB):  LEFT TOTAL HIP ARTHROPLASTY (Left) 1 Day Post-Op  Precautions: PWB (50% Left leg)  Chart, physical therapy assessment, plan of care and goals were reviewed. ASSESSMENT  Patient continues with skilled PT services and is slowly progressing towards goals. PM session - worked on [employer] left ankle - pt was able to slightly plantar flex - however fatigued after few reps. Pt amb 120 feet with RW with improved control of left ankle - however, during stairs pt rolled left ankle - inversion with visible swelling lateral malleolus (occurred during side stepping on platform). Nurse and Alejandro Rinaldi notified.   Ice applied by PA to left ankle. Current Level of Function Impacting Discharge (mobility/balance): CGA to occ mod assist x 1 during amb to support left leg - prevent inversion of left ankle; stairs CGA ascending - mod assist descending     Other factors to consider for discharge: Sister who will be assisting pt at discharge - 66 yrs old and also with recent THR         PLAN :  Patient continues to benefit from skilled intervention to address the above impairments. Continue treatment per established plan of care. to address goals. Recommendation for discharge: (in order for the patient to meet his/her long term goals)  Physical therapy at least 2 days/week in the home - with increased family support    This discharge recommendation:  Has been made in collaboration with the attending provider and/or case management    IF patient discharges home will need the following DME: patient owns DME required for discharge       SUBJECTIVE:   Patient stated I think I will be ok.     OBJECTIVE DATA SUMMARY:   Critical Behavior:  Neurologic State: Alert  Orientation Level: Oriented X4  Cognition: Appropriate decision making, Appropriate for age attention/concentration, Appropriate safety awareness  Safety/Judgement: Awareness of environment, Home safety, Insight into deficits  Functional Mobility Training:  Bed Mobility:     Supine to Sit: Stand-by assistance;Bed Modified; Adaptive equipment (gait belt, use of bed rail, mod effort)     Scooting: Stand-by assistance        Transfers:  Sit to Stand: Contact guard assistance  Stand to Sit: Contact guard assistance                             Balance:  Sitting: Intact; Without support  Standing: Impaired; With support  Standing - Static: Good;Constant support  Standing - Dynamic : Fair;Constant support (left ankle and knee instabiity)  Ambulation/Gait Training:  Distance (ft): 120 Feet (ft)  Assistive Device: Walker, rolling;Gait belt  Ambulation - Level of Assistance: Contact guard assistance (to high guard/mod assist when left ankle inverts)        Gait Abnormalities: Decreased step clearance; Foot drop (occ left knee recurvatum-stance, instability ankle inversion)  Right Side Weight Bearing: Full  Left Side Weight Bearing: Partial (%) (50%)  Base of Support: Center of gravity altered;Shift to right  Stance: Left decreased  Speed/Genia: Slow  Step Length: Right shortened;Left shortened  Swing Pattern: Left asymmetrical           Stairs:  Number of Stairs Trained: 4  Stairs - Level of Assistance: Minimum assistance; Moderate assistance (assist x 1 ascending; assist x 2 descending to stabilize ank)   Rail Use: Both    Therapeutic Exercises:   Seated edge of bed - performed left LAQ x 10; Left hip flexion x 5 ; AAROM left dorsiflexion > plantarflexion    Pain Rating:  Pre-treatment left hip 0/10; following treatment left ankle pain 5/10    Activity Tolerance:   Fair - limited by numbness - lack of control left ankle      After treatment patient left in no apparent distress:   Call bell within reach, Caregiver / family present, and Recliner with legs elevated - ice applied left ankle    COMMUNICATION/COLLABORATION:   The patients plan of care was discussed with: Registered nurse and Physician.      Lana Keith, PT   Time Calculation: 40 mins

## 2021-05-27 NOTE — PROGRESS NOTES
NOREEN: The patient plans to discharge home with [facility] and family to transport with stable for discharge. RUR: N/A    1. The patient is from home and lives with her sister. 2. Orthopedics, PT/OT following. 3. The patient plans to discharge home with [facility] and sister to transport. Observation notice provided in writing to patient and/or caregiver as well as verbal explanation of the policy. Patients who are in outpatient status also receive the Observation notice. Patient has received notice and or patient representative has received via secure email, fax, or certified mail based on patient representative's preference. Care Management Interventions  PCP Verified by CM: Yes  Palliative Care Criteria Met (RRAT>21 & CHF Dx)?: No  Mode of Transport at Discharge: Other (see comment)  Transition of Care Consult (CM Consult): [address]: No  Reason Outside [place]: Physician referred to specific agency  MyChart Signup: No  Discharge Durable Medical Equipment: No  Health Maintenance Reviewed: Yes  Physical Therapy Consult: Yes  Occupational Therapy Consult: Yes  Speech Therapy Consult: No  Current Support Network: Lives Alone  Confirm Follow Up Transport: Family  The Plan for Transition of Care is Related to the Following Treatment Goals : The patient plans to discharge home with [facility] and sister to transport.   The Patient and/or Patient Representative was Provided with a Choice of Provider and Agrees with the Discharge Plan?: Yes  Freedom of Choice List was Provided with Basic Dialogue that Supports the Patient's Individualized Plan of Care/Goals, Treatment Preferences and Shares the Quality Data Associated with the Providers?: Yes  Institute Resource Information Provided?: No  Discharge Location  Discharge Placement: Home with home health     Reason for Admission:  Left Total Hip arthroplasty                     RUR Score:    N/A                 Plan for utilizing home health: The Plan for Transition of Care is related to the following treatment goals: Home Health    The Patient and/or patient representative Vilma Ramirez was provided with a choice of provider and agrees   with the discharge plan. [x] Yes [] No    Freedom of choice list was provided with basic dialogue that supports the patient's individualized plan of care/goals, treatment preferences and shares the quality data associated with the providers. [x] Yes [] No       PCP: First and Last name:  Margot Atkins MD, phone: [phone]     Name of Practice:    Are you a current patient: Yes/No: Yes   Approximate date of last visit: Jan, 2020   Can you participate in a virtual visit with your PCP: Yes                    Current Advanced Directive/Advance Care Plan: Full Code      Healthcare Decision Maker:   Click here to complete [address] including selection of the Healthcare Decision Maker Relationship (ie \"Primary\")                             Transition of Care Plan:        CM spoke with patient in room 564. The patient is alert and oriented x4. Confirmed demographics. The patient was independent with ADL's/IAD's, owns her owns walker, drives a vehicle and uses [facility] on [address]. The patient's sister with ALS lives with her and she helps take care of her sister and another sister named He Trammell ([phone]) from [facility] is here staying with her to assist at home with her needs through recovery. The patient plans to discharge home with home health and sister Yoselin Brown to transport. CM following for discharge needs.      Valerie Villeda RN/CRM

## 2021-05-27 NOTE — PROGRESS NOTES
Problem: Mobility Impaired (Adult and Pediatric)  Goal: *Acute Goals and Plan of Care (Insert Text)  Description: FUNCTIONAL STATUS PRIOR TO ADMISSION: Patient was independent and active without use of DME. 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 HOME SUPPORT PRIOR TO ADMISSION: The patient lived with sister - pt primary caregiver to sister. Pt's will have assistance of other family members at discharge, other sisters, niece. Physical Therapy Goals  Initiated 5/26/2021    1. Patient will move from supine to sit and sit to supine  and scoot up and down in bed with modified independence within 4 days. 2. Patient will perform sit to stand with modified independence within 4 days. 3. Patient will ambulate with modified independence for > 150 feet with the least restrictive device within 4 days. 4. Patient will ascend/descend 4 stairs with one handrail(s) with modified independence within 4 days. 5. Patient will perform THR home exercise program per protocol with supervision/set-up within 4 days. Outcome: Progressing Towards Goal   PHYSICAL THERAPY TREATMENT  Patient: Marcy Ponce (16 y.o. female)  Date: 5/27/2021  Diagnosis: Primary localized osteoarthritis of left hip [M16.12] Primary localized osteoarthritis of left hip  Procedure(s) (LRB):  LEFT TOTAL HIP ARTHROPLASTY (Left) 1 Day Post-Op  Precautions: PWB (50% Left leg)  Chart, physical therapy assessment, plan of care and goals were reviewed. ASSESSMENT  Patient continues with skilled PT services and is progressing towards goals. Pt continues to c/o of left leg especially foot feeling numb - like a log and difficulty moving left leg. With active assistive ROM - able to confirm weak dorsiflex - 1-2/5, continues with 0/5 for plantar flexion. Pt continues with good quad set but weak hip flexors. With use of firm surface and reduction of friction (lunch tray) - pt able to initiate and increase ability to perform heel slide.   Patient amb increased distance - no ankle instability noted - however pt does tend to invert left ankle. Gait with left foot drop - compensating with increased hip flexion. Patient also with occ knee instability - with knee recurvatum. Patient had greatest difficulty descending stairs requiring mod assist x 1. Anticipate pt will require 2-3 more session before safe to discharge. Current Level of Function Impacting Discharge (mobility/balance): CGA to min assist x 1 for amb; 2 person assist for stairs    Other factors to consider for discharge: Pt's sister will be staying to assist pt for 3 weeks         PLAN :  Patient continues to benefit from skilled intervention to address the above impairments. Continue treatment per established plan of care. to address goals. Recommendation for discharge: (in order for the patient to meet his/her long term goals)  Physical therapy at least 2 days/week in the home     This discharge recommendation:  Has been made in collaboration with the attending provider and/or case management    IF patient discharges home will need the following DME: patient owns DME required for discharge       SUBJECTIVE:   Patient stated my foot is still not moving.     OBJECTIVE DATA SUMMARY:   Critical Behavior:  Neurologic State: Alert, Appropriate for age  Orientation Level: Oriented X4  Cognition: Appropriate decision making, Appropriate for age attention/concentration, Appropriate safety awareness, Follows commands  Safety/Judgement: Awareness of environment, Good awareness of safety precautions  Functional Mobility Training:  Bed Mobility:     Supine to Sit: Stand-by assistance; Additional time;Bed Modified (HOB elevated - use of bed rail x 1)     Scooting: Stand-by assistance        Transfers:  Sit to Stand: Minimum assistance;Assist x1 (to stabalize left ankle)  Stand to Sit: Contact guard assistance                             Balance:  Sitting: Intact; Without support  Standing: With support; Impaired  Standing - Static: Good;Constant support  Standing - Dynamic : Fair;Constant support  Ambulation/Gait Training:  Distance (ft): 120 Feet (ft)  Assistive Device: Walker, rolling;Gait belt  Ambulation - Level of Assistance: Contact guard assistance;Minimal assistance        Gait Abnormalities: Decreased step clearance; Foot drop (occ poor left knee control - recurvatum)  Right Side Weight Bearing: Full  Left Side Weight Bearing: Partial (%) (50% weight bearing left leg)  Base of Support: Center of gravity altered;Shift to right  Stance: Left decreased  Speed/Genia: Slow  Step Length: Right shortened;Left shortened  Swing Pattern: Left asymmetrical           Stairs:  Number of Stairs Trained: 4  Stairs - Level of Assistance: Additional time; Adaptive equipment (Min assist ascending; mod assist x 2 descending)   Rail Use:  (Left ascending; cane right)    Therapeutic Exercises:   SUPINE  EXERCISES   Sets   Reps   Active Active Assist   Passive Self ROM   Comments   Ankle Pumps   []                                        [x]                                        []                                        []                                        0/5 plantar flex  1-2/5 dorsiflexion   Quad Sets  5 [x]                                        []                                        []                                        []                                           Heel Slides 4 5 [x]                                        []                                        []                                        []                                        With use of hard surface - lunch tray and wash cloth decrease friction   Hip Abduction  5 [x]                                        []                                        []                                        []                                           Glut Sets 2 5 [x]                                        []                                        [] []                                              []                                        []                                        []                                        []                                              []                                        []                                        []                                        []                                             Pain Rating:  Pt continues to deny pain - after walking reporting mild soreness. Activity Tolerance:   Good      After treatment patient left in no apparent distress:   Sitting in chair, Call bell within reach, and Caregiver / family present    COMMUNICATION/COLLABORATION:   The patients plan of care was discussed with: Registered nurse.      Maria De Jesus Jerez, PT   Time Calculation: 50 mins

## 2021-05-27 NOTE — PROGRESS NOTES
RN informed VALERY Morales that patient was still complaining of numbness in her operative leg/hip. PA said he would go assess patient. RN also told him that patient's urine looked green on assessment. PA said that as long as patient was not having any other urinary tract complaints, then she could follow up with her primary care if this persisted.

## 2021-05-28 VITALS
TEMPERATURE: 98 F | OXYGEN SATURATION: 99 % | RESPIRATION RATE: 17 BRPM | HEART RATE: 88 BPM | HEIGHT: 66 IN | WEIGHT: 165.79 LBS | DIASTOLIC BLOOD PRESSURE: 79 MMHG | SYSTOLIC BLOOD PRESSURE: 130 MMHG | BODY MASS INDEX: 26.64 KG/M2

## 2021-05-28 PROCEDURE — 74011250637 HC RX REV CODE- 250/637: Performed by: PHYSICIAN ASSISTANT

## 2021-05-28 PROCEDURE — 97530 THERAPEUTIC ACTIVITIES: CPT

## 2021-05-28 PROCEDURE — 97116 GAIT TRAINING THERAPY: CPT

## 2021-05-28 PROCEDURE — 97535 SELF CARE MNGMENT TRAINING: CPT

## 2021-05-28 RX ADMIN — DULOXETINE HYDROCHLORIDE 20 MG: 20 CAPSULE, DELAYED RELEASE ORAL at 09:02

## 2021-05-28 RX ADMIN — Medication 10 ML: at 14:43

## 2021-05-28 RX ADMIN — ASPIRIN 325 MG: 325 TABLET, COATED ORAL at 09:02

## 2021-05-28 RX ADMIN — ACETAMINOPHEN 650 MG: 325 TABLET ORAL at 14:42

## 2021-05-28 RX ADMIN — METOPROLOL SUCCINATE 50 MG: 50 TABLET, EXTENDED RELEASE ORAL at 09:02

## 2021-05-28 RX ADMIN — PANTOPRAZOLE SODIUM 20 MG: 20 TABLET, DELAYED RELEASE ORAL at 07:02

## 2021-05-28 RX ADMIN — ACETAMINOPHEN 650 MG: 325 TABLET ORAL at 09:03

## 2021-05-28 RX ADMIN — Medication 5 ML: at 06:00

## 2021-05-28 RX ADMIN — DOCUSATE SODIUM 50 MG AND SENNOSIDES 8.6 MG 1 TABLET: 8.6; 5 TABLET, FILM COATED ORAL at 09:02

## 2021-05-28 RX ADMIN — CELECOXIB 200 MG: 200 CAPSULE ORAL at 09:02

## 2021-05-28 RX ADMIN — ACETAMINOPHEN 650 MG: 325 TABLET ORAL at 02:38

## 2021-05-28 NOTE — PROGRESS NOTES
Problem: Mobility Impaired (Adult and Pediatric)  Goal: *Acute Goals and Plan of Care (Insert Text)  Description: FUNCTIONAL STATUS PRIOR TO ADMISSION: Patient was independent and active without use of DME. 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 HOME SUPPORT PRIOR TO ADMISSION: The patient lived with sister - pt primary caregiver to sister. Pt's will have assistance of other family members at discharge, other sisters, niece. Physical Therapy Goals  Initiated 5/26/2021    1. Patient will move from supine to sit and sit to supine  and scoot up and down in bed with modified independence within 4 days. 2. Patient will perform sit to stand with modified independence within 4 days. 3. Patient will ambulate with modified independence for > 150 feet with the least restrictive device within 4 days. 4. Patient will ascend/descend 4 stairs with one handrail(s) with modified independence within 4 days. 5. Patient will perform THR home exercise program per protocol with supervision/set-up within 4 days. Outcome: Progressing Towards Goal   PHYSICAL THERAPY TREATMENT  Patient: Nadege Lipoma (43 y.o. female)  Date: 5/28/2021  Diagnosis: Primary localized osteoarthritis of left hip [M16.12] Primary localized osteoarthritis of left hip  Procedure(s) (LRB):  LEFT TOTAL HIP ARTHROPLASTY (Left) 2 Days Post-Op  Precautions: PWB (50% Left leg)  Chart, physical therapy assessment, plan of care and goals were reviewed. ASSESSMENT  Patient continues with skilled PT services and is progressing towards goals. Today therapist assisted pt to alpesh lace up tennis shoes to provide increased support to left ankle. During amb with shoes - decreased frequency of supination/inversion and patient able to correct with verbal cues to increase weight bearing over great toe. Patient reporting only minimal discomfort to palpation of left lateral malleolus and swelling decreased from 5/27.   Will see this pm after delivery of AFO - if improved control of left ankle and safety improved - anticipate pt will be ready for discharge . Current Level of Function Impacting Discharge (mobility/balance): Standby assist to min assist for functional mobility - limited secondary to \"rolling of left ankle\" - inversion/supination - awaiting AFO    Other factors to consider for discharge: Sister from out of town - 77 yo will be caregiver to patient and other sister with ALS         PLAN :  Patient continues to benefit from skilled intervention to address the above impairments. Continue treatment per established plan of care. to address goals. Recommendation for discharge: (in order for the patient to meet his/her long term goals)  Physical therapy at least 2 days/week in the home     This discharge recommendation:  Has been made in collaboration with the attending provider and/or case management    IF patient discharges home will need the following DME: ? AFO       SUBJECTIVE:   Patient stated the swelling is better on my ankle    OBJECTIVE DATA SUMMARY:   Critical Behavior:  Neurologic State: Appropriate for age, Alert  Orientation Level: Oriented X4  Cognition: Appropriate decision making, Appropriate for age attention/concentration, Appropriate safety awareness, Follows commands  Safety/Judgement: Awareness of environment, Home safety, Insight into deficits  Functional Mobility Training:  Bed Mobility:     Supine to Sit: Stand-by assistance (coming up to right side of bed)     Scooting: Stand-by assistance        Transfers:  Sit to Stand: Contact guard assistance  Stand to Sit: Contact guard assistance                             Balance:  Sitting: Intact; Without support  Standing: Impaired; With support  Standing - Static: Good;Constant support  Standing - Dynamic : Good;Constant support  Ambulation/Gait Training:  Distance (ft): 50 Feet (ft)  Assistive Device: Walker, rolling;Gait belt  Ambulation - Level of Assistance: Contact guard assistance        Gait Abnormalities: Decreased step clearance (No left foot inversion/supination noted)  Right Side Weight Bearing: Full  Left Side Weight Bearing: Partial (%)  Base of Support: Center of gravity altered;Shift to right  Stance: Left decreased  Speed/Genia: Slow  Step Length: Right shortened;Left shortened  Swing Pattern: Left asymmetrical     Therapeutic Exercises:   Pt performed AAROM left ankle dorsiflexion/plantar flexion and inversion/supination and eversion/pronation. Pain Rating:  Pt denied pain left hip incision    Activity Tolerance:   Fair - limited by numbness and decreased motor control left ankle - awaiting AFO. After treatment patient left in no apparent distress:   Call bell within reach and [address] chair with legs elevated    COMMUNICATION/COLLABORATION:   The patients plan of care was discussed with: Registered nurse.      Leonard Pedersen PT   Time Calculation: 35 mins

## 2021-05-28 NOTE — PROGRESS NOTES
Problem: Self Care Deficits Care Plan (Adult)  Goal: *Acute Goals and Plan of Care (Insert Text)  Description: FUNCTIONAL STATUS PRIOR TO ADMISSION: Pt reports residing in single story home with 4 KELLY and pt reporting she was Mod Independent with ADLs at , using various long-handled AE for distal LE management. 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 HOME SUPPORT: The patient lived with sister and was her primary caregiver; will have another sister staying with her and PRN assist from niece. Occupational Therapy Goals  Initiated 5/27/2021  1. Patient will perform lower body ADLs with AE supervision/set-up within 4 day(s). 2.  Patient will perform upper body ADLs standing 5 mins without fatigue or LOB with supervision/set-up within 4 day(s). 3.  Patient will perform toilet transfer with supervision/set-up within 4 day(s). 4.  Patient will perform all aspects of toileting with supervision/set-up within 4 day(s). 5.  Patient will participate in upper extremity therapeutic exercise/activities with supervision/set-up for 10 minutes within 4 day(s). 6.  Patient will utilize energy conservation techniques during functional activities without cues within 4 day(s). Outcome: Progressing Towards Goal    OCCUPATIONAL THERAPY TREATMENT  Patient: Naveed Diaz (54 y.o. female)  Date: 5/28/2021  Diagnosis: Primary localized osteoarthritis of left hip [M16.12] Primary localized osteoarthritis of left hip  Procedure(s) (LRB):  LEFT TOTAL HIP ARTHROPLASTY (Left) 2 Days Post-Op  Precautions: PWB (50% Left leg)  Chart, occupational therapy assessment, plan of care, and goals were reviewed. ASSESSMENT  Patient continues with skilled OT services and is progressing towards goals.   Pt noted with progressive mobility/balance and standing grooming, with pt noted to be wearing AFO and reporting increased confidence with functional balance with it's use. Pt noted with progressive understanding of presented modified dressing techniques and energy conservation techniques of completing all distal aspects prior to standing once for proximal management. Pt safe for discharge home from a self-care standpoint, with reporting therapist anticipating no future needs once discharged. Current Level of Function Impacting Discharge (ADLs): Supervision    Other factors to consider for discharge: none         PLAN :  Patient continues to benefit from skilled intervention to address the above impairments. Continue treatment per established plan of care to address goals. Recommend with staff: OOB meals, Active ADL engagement, Supervision RW toileting    Recommend next OT session: POC progression     Recommendation for discharge: (in order for the patient to meet his/her long term goals)  No skilled occupational therapy/ follow up rehabilitation needs identified at this time. This discharge recommendation:  Has been made in collaboration with the attending provider and/or case management    IF patient discharges home will need the following DME: patient owns DME required for discharge       SUBJECTIVE:   Patient stated you've really given me a lot of helpful hints.     OBJECTIVE DATA SUMMARY:   Cognitive/Behavioral Status:  Perception: Appears intact  Perseveration: No perseveration noted  Safety/Judgement: Awareness of environment;Home safety; Insight into deficits    Functional Mobility and Transfers for ADLs:  Bed Mobility:  Supine to Sit: Stand-by assistance (coming up to right side of bed)  Scooting: Stand-by assistance    Transfers:  Sit to Stand: Stand-by assistance  Functional Transfers  Bathroom Mobility: Stand-by assistance       Balance:  Sitting: Intact; Without support  Standing: Impaired; With support  Standing - Static: Good;Constant support  Standing - Dynamic : Good;Constant support    ADL Intervention:  Grooming  Grooming Assistance: Supervision  Position Performed: Standing (at  Company)  Washing Hands: Supervision    Lower Body Dressing Assistance  Underpants: Supervision (simluated)  Pants With Elastic Waist: Supervision (simluated)  Leg Crossed Method Used: No  Position Performed: Seated edge of bed;Standing (at )  Adaptive Equipment Used: Reacher    Cognitive Retraining  Safety/Judgement: Awareness of environment;Home safety; Insight into deficits    Pain:  No c/o pain    Activity Tolerance:   Good, Fair, and requires rest breaks    After treatment patient left in no apparent distress:   Supine in bed, Call bell within reach, and Side rails x 3    COMMUNICATION/COLLABORATION:   The patients plan of care was discussed with: Physical therapist, Registered nurse, and Case management.      Marah Jalloh OT  Time Calculation: 15 mins

## 2021-05-28 NOTE — PROGRESS NOTES
TOTAL HIP PROGRESS NOTE      Subjective:     Post-Operative Day: 2 Status Post left Total Hip Arthroplasty  Systemic or Specific Complaints:weakness    Objective:     Patient Vitals for the past 24 hrs:   BP Temp Pulse Resp SpO2   05/28/21 0330 136/69 97.9 °F (36.6 °C) 71 16 95 %   05/27/21 2059 128/67 97.7 °F (36.5 °C) 72 16 96 %   05/27/21 1510 107/64 98 °F (36.7 °C) 71 18 95 %   05/27/21 0845 131/74 98.2 °F (36.8 °C) 78 17 97 %       General: alert, cooperative, no distress, appears stated age   Wound: Wound clean and dry no evidence of infection. , No Erythema, No Edema, No Drainage and Wound Intact   Motion: Painless Range of Motion   DVT Exam: No evidence of DVT seen on physical exam.  Negative Channing's sign. No cords or calf tenderness. No significant calf/ankle edema. Data Review:  No results found for this or any previous visit (from the past 24 hour(s)). Assessment:     Status Post left Total Hip Arthroplasty. Doing well postoperatively. .  Bandage aquacel, clean/dry. Labs stable. PT progressing, but setback with rolling ankle yesterday with PT; better today. Pain control WNL. Generalized weakness with dorsiflexion     Plan:     Continues current post-op course  Continue PT per protocol. Follow PT progression, better stabilization/strength left lower extremity. Probable discharge to [facility]. today.

## 2021-05-28 NOTE — PROGRESS NOTES
Medicare Reason for Inpatient    Need for admitting the patient as an Inpatient:Yes    Lack of pain control: NO    Slow PT Progress: Yes    Exacerbation of Chronic Condition: [address] 75 Rehab or SNF: No    New onset medical conditions: NO

## 2021-05-28 NOTE — PROGRESS NOTES
Have consulted Jack Amaya of [facility] [employer] to fit the patient with a left sided AFO for peripheral neuropathy and mild foot drop. Will place later today.   Continue WBAT

## 2021-05-28 NOTE — PROGRESS NOTES
Problem: Mobility Impaired (Adult and Pediatric)  Goal: *Acute Goals and Plan of Care (Insert Text)  Description: FUNCTIONAL STATUS PRIOR TO ADMISSION: Patient was independent and active without use of DME. 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 HOME SUPPORT PRIOR TO ADMISSION: The patient lived with sister - pt primary caregiver to sister. Pt's will have assistance of other family members at discharge, other sisters, niece. Physical Therapy Goals  Initiated 5/26/2021    1. Patient will move from supine to sit and sit to supine  and scoot up and down in bed with modified independence within 4 days. 2. Patient will perform sit to stand with modified independence within 4 days. 3. Patient will ambulate with modified independence for > 150 feet with the least restrictive device within 4 days. 4. Patient will ascend/descend 4 stairs with one handrail(s) with modified independence within 4 days. 5. Patient will perform THR home exercise program per protocol with supervision/set-up within 4 days. 5/28/2021 1451 by Patrick Dickerson PT  Outcome: Progressing Towards Goal     PHYSICAL THERAPY TREATMENT  Patient: Lui Velasquez (42 y.o. female)  Date: 5/28/2021  Diagnosis: Primary localized osteoarthritis of left hip [M16.12] Primary localized osteoarthritis of left hip  Procedure(s) (LRB):  LEFT TOTAL HIP ARTHROPLASTY (Left) 2 Days Post-Op  Precautions: PWB (50% Left leg)  Chart, physical therapy assessment, plan of care and goals were reviewed. ASSESSMENT  Patient continues with skilled PT services and is progressing towards goals. Pt seen with orthotist to eval effectiveness of AFO providing enough medial/lateral stability to prevent \"rolling of left ankle\". Pt instructed in proper donning of left AFO - place in shoe first - will need assistance of caregiver.   Patient also instructed to initially perform skin check frequently to ensure no pressure areas as pt with baseline neuropathy and now increased numbness s/p Left NIRU. Pt amb with RW - no rolling/supination of left foot observed - pt also with increased stability and confidence on stairs. Patient cleared for discharge from PT standpoint - will need assist of family for stairs initially - to enter home. Current Level of Function Impacting Discharge (mobility/balance): Standby assist for transfers/amb with RW; CGA for stairs with rail/cane    Other factors to consider for discharge:  Pt will need additional support of family at discharge - family aware         PLAN :  Patient continues to benefit from skilled intervention to address the above impairments. Continue treatment per established plan of care. to address goals. Recommendation for discharge: (in order for the patient to meet his/her long term goals)  Physical therapy at least 2 days/week in the home AND ensure assist and/or supervision for safety with stairs    This discharge recommendation:  Has been made in collaboration with the attending provider and/or case management    IF patient discharges home will need the following DME: patient owns DME required for discharge       SUBJECTIVE:   Patient stated my ankle feels much more stable with the brace on.    OBJECTIVE DATA SUMMARY:   Critical Behavior:  Neurologic State: Appropriate for age, Alert  Orientation Level: Oriented X4  Cognition: Appropriate decision making, Appropriate for age attention/concentration, Appropriate safety awareness, Follows commands  Safety/Judgement: Awareness of environment, Home safety, Insight into deficits  Functional Mobility Training:  Bed Mobility:     Supine to Sit: Minimum assistance (getting out of bed left side)     Scooting: Stand-by assistance        Transfers:  Sit to Stand: Stand-by assistance (Simultaneous filing. User may not have seen previous data.)  Stand to Sit: Stand-by assistance (increased difficulty to/from low surfaces   Simultaneous filing.  User may not have seen previous data.) Balance:  Sitting: Intact; Without support  Standing: Intact; With support  Standing - Static: Good;Constant support  Standing - Dynamic : Good;Constant support  Ambulation/Gait Training:  Distance (ft): 150 Feet (ft)  Assistive Device: Walker, rolling;Gait belt  Ambulation - Level of Assistance: Stand-by assistance        Gait Abnormalities: Decreased step clearance  Right Side Weight Bearing: Full  Left Side Weight Bearing: Partial (%)  Base of Support: Center of gravity altered;Shift to right  Stance: Left decreased  Speed/Genia: Slow  Step Length: Right shortened;Left shortened  Swing Pattern: Left asymmetrical        Stairs:  Number of Stairs Trained: 4  Stairs - Level of Assistance: Contact guard assistance;Minimum assistance   Rail Use: Left  (Left ascending cane right )    Pain Rating:  Pt reports only mild pain left hip incision during supine to/from sit    Activity Tolerance:   Good - improved with use of Left AFO - no instability observed    After treatment patient left in no apparent distress:   Supine in bed and Call bell within reach    COMMUNICATION/COLLABORATION:   The patients plan of care was discussed with: Registered nurse.      Keegan Rascon PT   Time Calculation: 45 mins

## 2021-05-28 NOTE — PROGRESS NOTES
Bedside and Verbal shift change report given to [address] [place] [address] E (oncoming nurse) by Christian Gustafson (offgoing nurse). Report included the following information SBAR, Kardex, Procedure Summary, Intake/Output and MAR.

## 2021-05-29 NOTE — PROGRESS NOTES
The Joint Replacement Discharge Education video was reviewed by the patient/family. The content was discussed utilizing teach back, questions were answered. The patient verbalized an understanding of the instructions. LINK TO JOINT DISCHARGE EDUCATION VIDEO:  [id number]            I have reviewed discharge instructions with the patient. The patient verbalized understanding.

## 2021-06-01 ENCOUNTER — TELEPHONE (OUTPATIENT)
Dept: INTERNAL MEDICINE CLINIC | Age: 81
End: 2021-06-01

## 2021-06-01 NOTE — DISCHARGE SUMMARY
Discharge Summary    Physician: Anastasia Hicks MD    Physician Assistant: Jeanette Spears PA-C    Admit Diagnosis:  Primary localized osteoarthritis of left hip [M16.12]    Final Diagnosis:   1. Advanced degenerative arthritis of left hip . Procedure: Left total hip replacement    Complications: None. History of Present Illness: The patient has a long-standing history of pain within the left hip . The patient has severe degenerative arthritis of the left hip and has exhausted conservative therapy at this time including prior intra-articular injections, activity modifications, OTC NSAIDS. The patient has been limited in their ability to perform ADLs, walk short distances or climb steps appropriately. The patient presents for the above-mentioned procedure. The patient has been cleared from a medical and cardiac standpoint. Past Medical History:  Recorded in the chart. Physical Examination: Also recorded in the chart. The patient is noted for ambulating with an antalgic gait favoring the left side. Examination of the left hip reveals 75% reduction in the rotation. Left leg 1 cm shorter than the right. Skin intact. No effusion. No sign of infection. No ecchymosis or erythema. No cellulitis or rash. No calf pain, swelling, or other evidence of DVT. I detect no obvious motor or sensory deficits in the lower extremity. The neurovascular status is intact . X-rays reveal advanced degenerative arthritis. No fractures or evidence of metastatic disease. I ordered and interpreted x-rays of the RIGHT hip which reveal mild to moderate degenerative arthritis. No fractures or evidence of metastatic disease. Course in the Hospital:  The patient was admitted to the hospital.  The Pt. Underwent a left total hip replacement . Postoperatively, the pt. did well. The pt. Remained stable from a medical standpoint. The patient ambulated, 50% WBAT weightbearing within the hospital with Physical Therapy.  The patient continued with this therapy at  [facility] with PT. The patient began taking  mg BID post-operatively for DVT prophylaxis and will continue on this for 30 days. At the time of discharge, there was no evidence of any DVT noted. The patient had negative Homans signs bilateral lower extremities. At the time of discharge, the patient's left hip incision appeared with staples intact. No signs of infection or inflammation noted. The patient will follow up in our office in 2-1/2 weeks. DC med list:  Discharge Medication List as of 5/28/2021  3:25 PM      START taking these medications    Details   aspirin (ASPIRIN) 325 mg tablet Take 1 Tablet by mouth two (2) times a day for 30 days. , Normal, Disp-60 Tablet, R-0      HYDROcodone-acetaminophen (NORCO) 5-325 mg per tablet Take 1 Tablet by mouth every four (4) hours as needed for Pain for up to 7 days. Max Daily Amount: 6 Tablets., Normal, Disp-40 Tablet, R-0         CONTINUE these medications which have NOT CHANGED    Details   OTHER JOINT CREAM, Historical Med      diphenhydrAMINE (BenadryL) 25 mg capsule Take 25 mg by mouth nightly as needed for Allergies. , Historical Med      zolpidem (AMBIEN) 5 mg tablet TAKE 1/2 TO 1 TABLET BY MOUTH AT BEDTIME AS NEEDED, Normal, Disp-30 Tab, R-1This request is for a new prescription for a controlled substance as required by Federal/State law.       DULoxetine (CYMBALTA) 20 mg capsule TAKE 1 CAPSULE BY MOUTH EVERY DAY, Normal, Disp-90 Cap, R-0      bisoprolol-hydroCHLOROthiazide (ZIAC) 5-6.25 mg per tablet TAKE 1 TABLET BY MOUTH EVERY DAY, Normal, Disp-90 Tab, R-0      Uro--10-40.8-36 mg cap capsule TAKE 1 CAP BY MOUTH FOUR (4) TIMES DAILY AS NEEDED FOR PAIN., Normal, Disp-60 Cap, R-3      diclofenac EC (VOLTAREN) 75 mg EC tablet TAKE 1 TAB BY MOUTH ONCE A DAY AS NEEDED., Normal, Disp-90 Tab, R-5      acetaminophen (Tylenol Extra Strength) 500 mg tablet Take  by mouth every six (6) hours as needed for Pain., Historical Med      cimetidine (Tagamet HB) 200 mg tab Take 200 mg by mouth as needed., Historical Med      calcium carb/vit D3/minerals (CALCIUM-VITAMIN D PO) Take  by mouth., Historical Med      B.infantis-B.ani-B.long-B.bifi (PROBIOTIC 4X) 10-15 mg TbEC Take  by mouth., Historical Med         STOP taking these medications       traMADoL (ULTRAM) 50 mg tablet Comments:   Reason for Stopping:               Patient Disposition: Home  Followup Care:  Discharge Condition: good  PT/OT per Home Health  Regular Diet  As directed    Follow-up Information     Follow up With Specialties Details Why Contact Info    [address] [facility] [address] [facility] Emergency Medicine   [phone] [phone]  [phone]     [facility]   [address] [place] [address] [address] [place] [address], Yoselyn Ceballos MD Internal Medicine   [address] [facility]  [address]                    Danie Andrews PA-C

## 2021-06-01 NOTE — TELEPHONE ENCOUNTER
Pt said she was told to let her dr know she  Is out of the hospital had total hip replacement with complication any question you can call pt at [phone]

## 2021-06-02 ENCOUNTER — PATIENT OUTREACH (OUTPATIENT)
Dept: CASE MANAGEMENT | Age: 81
End: 2021-06-02

## 2021-06-02 NOTE — PROGRESS NOTES
Care Transitions Initial Call    Call within 2 business days of discharge: No CTN got assigned to patient today 2021    Patient: Naveed Diaz Patient : 1940 MRN: [id number]    Last Discharge Brigido Bernal  Facility       Complaint Diagnosis Description Type Department Provider    21  Status post hip replacement, unspecified laterality Admission (Discharged) Kyung Cotto MD          Was this an external facility discharge? No     Challenges to be reviewed by the provider   Additional needs identified to be addressed with provider: yes    Patient reports that she has no feeling in her foot up to her knee in left leg after having hip replacement while still inpatient. Dr Charline Calvin is aware, has follow up 6/10/2021. Patient declined CTN offer to schedule PCP or [facility] follow up. Patient states she does not have a smart phone or computer, was not interested in \"phone call visit\". At [facility] Pt and OT         Method of communication with provider : chart routing    Discussed COVID-19 related testing which was available at this time. Test results were negative. Patient informed of results, if available? yes preop testing    Advance Care Planning:   Does patient have an Advance Directive: yes, reviewed and current. Inpatient Readmission Risk score: No data recorded  Was this a readmission?  no       Patients top risk factors for readmission: functional physical ability   Interventions to address risk factors: Scheduled appointment with PCP-patient declined at this time, Scheduled appointment with Hiral Lin states she already has scheduled follow up 6/10, Education of patient/family/caregiver/guardian to support self-management-reviewed post op instructions/red flags to call MD and Assistance in accessing community resources-Home care aides and [facility] [place] patient has Ensure HH in place,ensure patient has equipment for safe ambulation    Care Transition Nurse (CTN) contacted the patient by telephone to perform post hospital discharge assessment. Verified name and  with patient as identifiers. Provided introduction to self, and explanation of the CTN role. CTN reviewed discharge instructions, medical action plan and red flags with patient who verbalized understanding. Were discharge instructions available to patient? yes. Reviewed appropriate site of care based on symptoms and resources available to patient including: Specialist. Patient given an opportunity to ask questions and does not have any further questions or concerns at this time. The patient agrees to contact the PCP office for questions related to their healthcare. Medication reconciliation was performed with patient, who verbalizes understanding of administration of home medications. Advised obtaining a 90-day supply of all daily and as-needed medications. Referral to Pharm D needed: no     Home Health/Outpatient orders at discharge: PT and Lauren 50: At [facility]  Date of initial visit: 21    Durable Medical Equipment ordered at discharge: None    Covid Risk Education    Educated patient about risk for severe COVID-19 due to risk factors according to CDC guidelines. CTN reviewed discharge instructions, medical action plan and red flag symptoms with the patient who verbalized understanding. Discussed COVID vaccination status: yes. Education provided on COVID-19 vaccination as appropriate. Discussed exposure protocols and quarantine with CDC Guidelines. Patient was given an opportunity to verbalize any questions and concerns and agrees to contact CTN or health care provider for questions related to their healthcare. Was patient discharged with a pulse oximeter? no.    Discussed follow-up appointments. If no appointment was previously scheduled, appointment scheduling offered: yes. Is follow up appointment scheduled within 7 days of discharge? No patient declined PCP NOREEN appt. [facility] follow up appointment(s): No future appointments. Non-[facility] follow up appointment(s): Dr Simona Reis 6/10/2021    Plan for follow-up call in 5-7 days based on severity of symptoms and risk factors. Plan for next call: self management-contacting aide agencies  CTN provided contact information for future needs. Goals      Prevent complications post hospitalization. 06/02/21  CTN contacted patient however she states that her therapist is present in the home, took CTN contact number to call when PT completed---mkrw    Patient returned call after her PT session. CTN reviewed the following to prevent complications post discharge:    Appts:    PCP Dr Mary Lopez declines setting up a Virtual Visit at this time, she states she does not have a smart phone or computer to use. She states that when she gets her staples out and can get more mobile she will call to schedule PCP appt herself. Ortho Dr Johan Ness states she has a follow up on 6/10/21     At [facility] ---pt reports PT came today to work with her. Patient also has OT ordered.  Patient reports that after surgery her foot and leg up to her knee became numb. She states Dr Simona Reis is aware, he feels that due to procedure taking a long time to perform ( using spinal anesthesia) and patient already having neuropathy that this is the reason for the numbness. They are hoping that in time the feeling will return. Patient states Dr Simona Reis was very attentive during hospitalization and checked on pt frequently.  Patient reports dressing was changed today by PT, reports no concerns or complications at this time with incision.  CTN reviewed written post op instructions to include s/sx DVT and constipation. 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 CTN instructed pt to increase fluids and to ambulate as much as tolerated.  CTN reviewed fall precautions with pt.  Patient states she is using walker and has a sister from out of town staying with her to assist.   CTN gave patient [facility] phone number and explained services. At time of call patient declined post discharge visit to be set up by CTN. CTN will follow up next week---genoveva         Supportive resources in place to maintain patient in the community (ie. Home Health, DME equipment, refer to, medication assistant plan, etc.)      06/02/21  Patient asks CTN about how to get care aides if she needs assistance with bathing and help at home after her [facility] leaves [facility] [address] [facility]. CTN will to mail patient list of Geovanna Oconnell from our [employer] resource list.    Patient states during conversation that she has a long term care policy from [place]. CTN instructed patient to call them to see if they will provide care aide benefit and to find out if she can use any agency or if they have a list of contracted providers that she has to use. Patient states she will call them tomorrow.     CTN will followup with pt next week---genoveva

## 2021-06-02 NOTE — NURSE NAVIGATOR
[facility]  [facility] [place] Bundle Handoff     FROM:                                TO: At [facility]                                                      ([facility] or Facility name)  Francisca Griffin 53  [address] [place] [address]  Dept: [address]: [phone]                                      Room#:  564/01                                                       Nurse Navigator:  Blank Verduzco RN         SITUATION      ASAScore: ASA 2 - Patient with mild systemic disease with no functional limitations    Admitted:  5/26/2021  Hospital Day: 3      Attending Provider:  No att. providers found     Consultations:  None    PCP:  Ursula Ball MD   [phone]     Admitting Dx:  Primary localized osteoarthritis of left hip [M16.12]       Principal Problem:    Primary localized osteoarthritis of left hip (5/18/2021)    Active Problems:    Paroxysmal atrial fibrillation ([address] (5/27/2021)      7 Days Post-Op of   Procedure(s):  LEFT TOTAL HIP ARTHROPLASTY   BY: Jr Sandhu MD             ON: 5/26/2021                  Code Status: Prior             Advance Directive? Yes Not W Pt (Send w/patient)     Isolation:  There are currently no Active Isolations       MDRO: No current active infections    BACKGROUND     Allergies:   Allergies   Allergen Reactions    Pcn [Penicillins] Swelling     Arm with injection    Sonata [Zaleplon] Itching       Past Medical History:   Diagnosis Date    Arthritis     DDD BACK PAIN    Chest pain, atypical     Chronic pain     HIP    Diverticulitis of colon     Fibrocystic disease of breast     GERD (gastroesophageal reflux disease)     Hypercholesterolemia     Hypertension     IBS (irritable bowel syndrome)     Menopause 1968    Neuropathy     Ovarian cyst        Past Surgical History:   Procedure Laterality Date    ENDOSCOPY, COLON, DIAGNOSTIC  10/22/2010    5 yr fu, [facility]    HX APPENDECTOMY  HX COLONOSCOPY  10/20/2016    Dr. Alcala Right - 5 yr f/u    HX DILATION AND CURETTAGE      HX TONSILLECTOMY         Prior to Admission Medications   Prescriptions Last Dose Informant Patient Reported? Taking? B.infantis-B.ani-B.long-B.bifi (PROBIOTIC 4X) 10-15 mg TbEC 5/19/2021 at Unknown time  Yes Yes   Sig: Take  by mouth. DULoxetine (CYMBALTA) 20 mg capsule 5/26/2021 at Unknown time  No Yes   Sig: TAKE 1 CAPSULE BY MOUTH EVERY DAY   OTHER   Yes No   Sig: JOINT CREAM   Uro--10-40.8-36 mg cap capsule 5/25/2021 at Unknown time  No Yes   Sig: TAKE 1 CAP BY MOUTH FOUR (4) TIMES DAILY AS NEEDED FOR PAIN. acetaminophen (Tylenol Extra Strength) 500 mg tablet 5/26/2021 at 0500  Yes Yes   Sig: Take  by mouth every six (6) hours as needed for Pain. bisoprolol-hydroCHLOROthiazide (ZIAC) 5-6.25 mg per tablet 5/26/2021 at 0600  No Yes   Sig: TAKE 1 TABLET BY MOUTH EVERY DAY   calcium carb/vit D3/minerals (CALCIUM-VITAMIN D PO) 5/19/2021 at Unknown time  Yes Yes   Sig: Take  by mouth. cimetidine (Tagamet HB) 200 mg tab 5/23/2021  Yes No   Sig: Take 200 mg by mouth as needed. diclofenac EC (VOLTAREN) 75 mg EC tablet 5/24/2021 at Unknown time  No Yes   Sig: TAKE 1 TAB BY MOUTH ONCE A DAY AS NEEDED. diphenhydrAMINE (BenadryL) 25 mg capsule 5/23/2021  Yes No   Sig: Take 25 mg by mouth nightly as needed for Allergies.    traMADoL (ULTRAM) 50 mg tablet 5/26/2021 at 0500  No No   Sig: TAKE 1 TABLET BY MOUTH EVERY 6 HOURS AS NEEDED   zolpidem (AMBIEN) 5 mg tablet 4/26/2021 at Unknown time  No Yes   Sig: TAKE 1/2 TO 1 TABLET BY MOUTH AT BEDTIME AS NEEDED      Facility-Administered Medications: None       Vaccinations:    Immunization History   Administered Date(s) Administered    Covid-19, MODERNA, Mrna, Lnp-s, Pf, 100mcg/0.5mL 01/29/2021, 02/26/2021    Influenza High Dose Vaccine PF 10/14/2013, 11/02/2015, 10/01/2016, 10/26/2016, 10/16/2017, 10/01/2018, 10/12/2019    Influenza Vaccine 09/17/2020    Influenza Vaccine (Tri) Adjuvanted (>65 Yrs FLUAD TRI 71036) 10/08/2018    Pneumococcal Conjugate (PCV-13) 05/18/2015    Pneumococcal Polysaccharide (PPSV-23) 07/13/2017    Tdap 05/18/2015    Zoster Recombinant 07/27/2020, 12/07/2020, 12/09/2020         ASSESSMENT   Age: 78 y.o. Gender: female        Height: Height: 5' 6\" (167.6 cm)                    Weight:Weight: 75.2 kg (165 lb 12.6 oz)     No data found. Active Orders   There are no active orders of the following types: Diet. Orientation: Orientation Level: Oriented X4    Active Lines/Drains:  (Peg Tube / Hull / CL or S/L?):no    Urinary Status: Voiding      Last BM: Last Bowel Movement Date:  (PTA)     Skin Integrity: Incision (comment) (L hip)             Mobility: Slightly limited   Weight Bearing Status: PWB (Partial Weight Bearing %) (50)      Gait Training  Assistive Device: Walker, rolling, Gait belt  Ambulation - Level of Assistance: Stand-by assistance  Distance (ft): 150 Feet (ft)  Stairs - Level of Assistance: Contact guard assistance, Minimum assistance  Number of Stairs Trained: 4  Rail Use: Left  (Left ascending cane right )     On Anticoagulation? YES  Aspirin                                         Pain Medications given:  Norco                                   Lab Results   Component Value Date/Time    Glucose 113 (H) 05/27/2021 03:26 AM    Hemoglobin A1c 5.7 (H) 05/20/2021 12:05 PM    INR 1.0 05/20/2021 12:05 PM    HGB 9.9 (L) 05/27/2021 03:26 AM    HGB 12.4 05/20/2021 12:05 PM    HGB 12.9 10/01/2020 02:05 PM    HGB 13.8 01/20/2020 12:00 AM       Readmission Risks:  Score:         RECOMMENDATION     See After Visit Summary (AVS) for:  · Discharge instructions  · After [address]   · Medication Reconciliation          [facility] Orthopaedic Nurse Navigator  ELMER Chacko, RN-BC       Office  [phone]  Cell      [phone]  Fax      [phone]  [email or web address]             . Jasmine

## 2021-06-02 NOTE — TELEPHONE ENCOUNTER
Pt d/c from [facility] following complicated left hip replacement. States she has no feeling in her left leg after surgery. Using [address] for PT - hoping to regain feeling & strength in left leg and foot. Using walker - cannot get into office for appointment at this time. Interested in getting help/assistance for her ADL's. Has not been contacted by CTN s/p discharge.

## 2021-06-07 ENCOUNTER — PATIENT OUTREACH (OUTPATIENT)
Dept: CASE MANAGEMENT | Age: 81
End: 2021-06-07

## 2021-06-07 NOTE — PROGRESS NOTES
Post Discharge Follow-up contact after Joint Replacement    Patient discharged on 5/28/21  By  Fabiola Rai   following  left hip Arthroplasty. Spoke with patient today, who reports they are \" being extra careful because I still do not have feeling in my foot; the feeling has come back in my leg, but not my foot. \"  Denies Fever, Shortness of Breath or Chest Pain. Home Health has visited. Patient also reports:  Incision  clean, dry, intact; Aquacel has been removed and staples will be removed on 6/9. Calf is non-tender, operative extremity has minimal swelling. Pain is well managed. Discussed use of ice & elevation. Patient is progressing with therapy and is exercising independently. Taking Aspirin for anticoagulation, Tylenol for pain. Patient is not experiencing symptoms of constipation & urinating without difficulty. Discussed side effects of anticoagulants & pain medications (bleeding/bruising, constipation, lightheaded/dizziness)  Follow up appointment is scheduled on 6/10. Discussed calling surgeon DR John Joseph  for drainage, bleeding, swelling in operative extremity, fever or pain. Discussed calling PCP Dr Nakia Archuleta with other medical issues.

## 2021-06-11 ENCOUNTER — PATIENT OUTREACH (OUTPATIENT)
Dept: CASE MANAGEMENT | Age: 81
End: 2021-06-11

## 2021-06-11 NOTE — PROGRESS NOTES
Goals      Prevent complications post hospitalization. 06/02/21  CTN contacted patient however she states that her therapist is present in the home, took CTN contact number to call when PT completed---mkrw    Patient returned call after her PT session. CTN reviewed the following to prevent complications post discharge:    Appts:    PCP Dr Rima Wu declines setting up a Virtual Visit at this time, she states she does not have a smart phone or computer to use. She states that when she gets her staples out and can get more mobile she will call to schedule PCP appt herself. Ortho Dr Charisse Ferraro states she has a follow up on 6/10/21     [facility] [address] ---pt reports PT came today to work with her. Patient also has OT ordered.  Patient reports that after surgery her foot and leg up to her knee became numb. She states Dr Cecelia Bhat is aware, he feels that due to procedure taking a long time to perform ( using spinal anesthesia) and patient already having neuropathy that this is the reason for the numbness. They are hoping that in time the feeling will return. Patient states Dr Cecelia Bhat was very attentive during hospitalization and checked on pt frequently.  Patient reports dressing was changed today by PT, reports no concerns or complications at this time with incision.  CTN reviewed written post op instructions to include s/sx DVT and constipation. 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 CTN instructed pt to increase fluids and to ambulate as much as tolerated.  CTN reviewed fall precautions with pt. Patient states she is using walker and has a sister from out of town staying with her to assist.   CTN gave patient [facility] phone number and explained services. At time of call patient declined post discharge visit to be set up by CTN. CTN will follow up next week---genoveva    06/11/21  CTN notes per chart review patient was contacted By Kvng Thompson ortho nurse navigator on 6/7/21. Patient reports numbness has subsided from leg but still has foot numbness. Patient has follow up with ortho Dr Lotus Bautista today. CTN will follow up next week with patient after ortho visit for updates--genoveva       Supportive resources in place to maintain patient in the community (ie. Home Health, DME equipment, refer to, medication assistant plan, etc.)      06/02/21  Patient asks CTN about how to get care aides if she needs assistance with bathing and help at home after her sister leaves [facility] [address] [facility] CTN will to mail patient list of Geovanna Oconnell from our One Note resource list.    Patient states during conversation that she has a long term care policy from [place]. CTN instructed patient to call them to see if they will provide care aide benefit and to find out if she can use any agency or if they have a list of contracted providers that she has to use. Patient states she will call them tomorrow.     CTN will followup with pt next week---genoveva

## 2021-06-24 ENCOUNTER — OFFICE VISIT (OUTPATIENT)
Dept: INTERNAL MEDICINE CLINIC | Age: 81
End: 2021-06-24
Payer: MEDICARE

## 2021-06-24 VITALS
HEART RATE: 64 BPM | DIASTOLIC BLOOD PRESSURE: 62 MMHG | RESPIRATION RATE: 18 BRPM | OXYGEN SATURATION: 97 % | SYSTOLIC BLOOD PRESSURE: 134 MMHG | WEIGHT: 172.2 LBS | HEIGHT: 66 IN | TEMPERATURE: 98.6 F | BODY MASS INDEX: 27.68 KG/M2

## 2021-06-24 DIAGNOSIS — R73.01 FASTING HYPERGLYCEMIA: ICD-10-CM

## 2021-06-24 DIAGNOSIS — Z96.642 HISTORY OF LEFT HIP REPLACEMENT: ICD-10-CM

## 2021-06-24 DIAGNOSIS — I10 ESSENTIAL HYPERTENSION: ICD-10-CM

## 2021-06-24 DIAGNOSIS — G62.9 NEUROPATHY: Primary | ICD-10-CM

## 2021-06-24 PROCEDURE — 1090F PRES/ABSN URINE INCON ASSESS: CPT | Performed by: INTERNAL MEDICINE

## 2021-06-24 PROCEDURE — G8399 PT W/DXA RESULTS DOCUMENT: HCPCS | Performed by: INTERNAL MEDICINE

## 2021-06-24 PROCEDURE — 99214 OFFICE O/P EST MOD 30 MIN: CPT | Performed by: INTERNAL MEDICINE

## 2021-06-24 PROCEDURE — G8536 NO DOC ELDER MAL SCRN: HCPCS | Performed by: INTERNAL MEDICINE

## 2021-06-24 PROCEDURE — G0463 HOSPITAL OUTPT CLINIC VISIT: HCPCS | Performed by: INTERNAL MEDICINE

## 2021-06-24 PROCEDURE — G8754 DIAS BP LESS 90: HCPCS | Performed by: INTERNAL MEDICINE

## 2021-06-24 PROCEDURE — G8419 CALC BMI OUT NRM PARAM NOF/U: HCPCS | Performed by: INTERNAL MEDICINE

## 2021-06-24 PROCEDURE — 3288F FALL RISK ASSESSMENT DOCD: CPT | Performed by: INTERNAL MEDICINE

## 2021-06-24 PROCEDURE — 1100F PTFALLS ASSESS-DOCD GE2>/YR: CPT | Performed by: INTERNAL MEDICINE

## 2021-06-24 PROCEDURE — G8427 DOCREV CUR MEDS BY ELIG CLIN: HCPCS | Performed by: INTERNAL MEDICINE

## 2021-06-24 PROCEDURE — G8510 SCR DEP NEG, NO PLAN REQD: HCPCS | Performed by: INTERNAL MEDICINE

## 2021-06-24 PROCEDURE — G8752 SYS BP LESS 140: HCPCS | Performed by: INTERNAL MEDICINE

## 2021-06-24 RX ORDER — DULOXETIN HYDROCHLORIDE 30 MG/1
30 CAPSULE, DELAYED RELEASE ORAL DAILY
Qty: 30 CAPSULE | Refills: 1 | Status: SHIPPED | OUTPATIENT
Start: 2021-06-24 | End: 2021-07-19 | Stop reason: SDUPTHER

## 2021-06-24 RX ORDER — DICLOFENAC SODIUM 10 MG/G
2 GEL TOPICAL 4 TIMES DAILY
COMMUNITY
Start: 2021-06-24

## 2021-06-24 RX ORDER — VITAMIN B COMPLEX
1 CAPSULE ORAL DAILY
COMMUNITY
Start: 2021-06-24

## 2021-06-24 NOTE — PATIENT INSTRUCTIONS
Achilles Tendon: Exercises  Introduction  Here are some examples of exercises for you to try. The exercises may be suggested for a condition or for rehabilitation. Start each exercise slowly. Ease off the exercises if you start to have pain. You will be told when to start these exercises and which ones will work best for you. How to do the exercises  Toe stretch   1. Sit in a chair, and extend your affected leg so that your heel is on the floor. 2. With your hand, reach down and pull your big toe up and back. Pull toward your ankle and away from the floor. 3. Hold the position for at least 15 to 30 seconds. 4. Repeat 2 to 4 times a session, several times a day. Calf-plantar fascia stretch   1. Sit with your legs extended and knees straight. 2. Place a towel around your foot just under the toes. 3. Hold each end of the towel in each hand, with your hands above your knees. 4. Pull back with the towel so that your foot stretches toward you. 5. Hold the position for at least 15 to 30 seconds. 6. Repeat 2 to 4 times a session, up to 5 sessions a day. Floor stretch   1. Stand about 2 feet from a wall, and place your hands on the wall at about shoulder height. Or you can stand behind a chair, placing your hands on the back of it for balance. 2. Step back with the leg you want to stretch. Keep the leg straight, and press your heel into the floor with your toe turned slightly in.  3. Lean forward, and bend your other leg slightly. Feel the stretch in the Achilles tendon of your back leg. Hold for at least 15 to 30 seconds. 4. Repeat 2 to 4 times a session, up to 5 sessions a day. Stair stretch   1. Stand with the balls of both feet on the edge of a step or curb (or a medium-sized phone book). With at least one hand, hold onto something solid for balance, such as a banister or handrail.   2. Keeping your affected leg straight, slowly let that heel hang down off of the step or curb until you feel a stretch in the back of your calf and/or Achilles area. Some of your weight should still be on the other leg. 3. Hold this position for at least 15 to 30 seconds. 4. Repeat 2 to 4 times a session, up to 5 times a day or whenever your Achilles tendon starts to feel tight. This stretch can also be done with your knee slightly bent. Strength exercise   1. This exercise will get you started on building strength after an Achilles tendon injury. Your doctor or physical therapist can help you move on to more challenging exercises as you heal and get stronger. 2. Stand on a step with your heel off the edge of the step. Hold on to a handrail or wall for balance. 3. Push up on your toes, then slowly count to 10 as you lower yourself back down until your heel is below the step. If it hurts to push up on your toes, try putting most of your weight on your other foot as you push up, or try using your arms to help you. If you can't do this exercise without causing pain, stop the exercise and talk to your doctor. 4. Repeat the exercise 8 to 12 times, half with the knee straight and half with the knee bent. Follow-up care is a key part of your treatment and safety. Be sure to make and go to all appointments, and call your doctor if you are having problems. It's also a good idea to know your test results and keep a list of the medicines you take. Where can you learn more? Go to http://www.gray.com/  Enter I703 in the search box to learn more about \"Achilles Tendon: Exercises. \"  Current as of: November 16, 2020               Content Version: 12.8  © 7405-2683 Healthwise, Incorporated. Care instructions adapted under license by [employer] (which disclaims liability or warranty for this information).  If you have questions about a medical condition or this instruction, always ask your healthcare professional. [address] any warranty or liability for your use of this information.

## 2021-06-24 NOTE — PROGRESS NOTES
Chief Complaint   Patient presents with    Hip Pain     follow up , left hip         1. Have you been to the ER, urgent care clinic since your last visit? Hospitalized since your last visit? No    2. Have you seen or consulted any other health care providers outside of the [address] [place] [address] since your last visit? Include any pap smears or colon screening.  No

## 2021-06-25 NOTE — PROGRESS NOTES
HPI:  Ziggy Can is a 78y.o. year old female who is here for a follow-up after recent left hip replacement. It was complicated by numbness and tingling down her left leg with weakness. She is finishing up physical therapy at home. She does feel that the sensation is better and her leg but continues to have some pain and discomfort in the left foot. The pain is now similar to her neuropathy pain she had before. She does feel that she is getting stronger. She denies any falls. She continues to have some arthritis pains as well. No nausea or vomiting. Her bowel movements have been good. No melena or hematochezia. Past Medical History:   Diagnosis Date    Arthritis     DDD BACK PAIN    Chest pain, atypical     Chronic pain     HIP    Diverticulitis of colon     Fibrocystic disease of breast     GERD (gastroesophageal reflux disease)     Hypercholesterolemia     Hypertension     IBS (irritable bowel syndrome)     Menopause 1968    Neuropathy     Ovarian cyst        Past Surgical History:   Procedure Laterality Date    ENDOSCOPY, COLON, DIAGNOSTIC  10/22/2010    5 yr fu, [facility]    HX APPENDECTOMY      HX COLONOSCOPY  10/20/2016    Dr. Jie Rodrigez - 5 yr f/u    HX DILATION AND CURETTAGE      HX HIP ARTHROSCOPY      HX TONSILLECTOMY         Prior to Admission medications    Medication Sig Start Date End Date Taking? Authorizing Provider   vitamin B complex capsule Take 1 Capsule by mouth daily. 6/24/21  Yes Tiffany Sagastume MD   diclofenac (VOLTAREN) 1 % gel Apply 2 g to affected area four (4) times daily. 6/24/21  Yes Tiffany Sagastume MD   DULoxetine (CYMBALTA) 30 mg capsule Take 1 Capsule by mouth daily. 6/24/21  Yes Tiffany Sagastume MD   aspirin (ASPIRIN) 325 mg tablet Take 1 Tablet by mouth two (2) times a day for 30 days.  5/27/21 6/26/21 Yes Gretta Heredia PA-C   OTHER JOINT CREAM   Yes Provider, Historical   diphenhydrAMINE (BenadryL) 25 mg capsule Take 25 mg by mouth nightly as needed for Allergies. Yes Provider, Historical   zolpidem (AMBIEN) 5 mg tablet TAKE 1/2 TO 1 TABLET BY MOUTH AT BEDTIME AS NEEDED 5/11/21  Yes Garrison Xiao MD   bisoprolol-hydroCHLOROthiazide [facility]) 5-6.25 mg per tablet TAKE 1 TABLET BY MOUTH EVERY DAY 4/7/21  Yes Garrison Xiao MD   Uro--10-40.8-36 mg cap capsule TAKE 1 CAP BY MOUTH FOUR (4) TIMES DAILY AS NEEDED FOR PAIN. 1/26/21  Yes Garrison Xiao MD   acetaminophen (Tylenol Extra Strength) 500 mg tablet Take  by mouth every six (6) hours as needed for Pain. Yes Provider, Historical   cimetidine (Tagamet HB) 200 mg tab Take 200 mg by mouth as needed. Yes Provider, Historical   calcium carb/vit D3/minerals (CALCIUM-VITAMIN D PO) Take  by mouth. Yes Provider, Historical   B.infantis-B.ani-B.long-B.bifi (PROBIOTIC 4X) 10-15 mg TbEC Take  by mouth. Yes Provider, Historical   DULoxetine (CYMBALTA) 20 mg capsule TAKE 1 CAPSULE BY MOUTH EVERY DAY 4/30/21 6/24/21  Nelda Osgood III, MD   diclofenac EC (VOLTAREN) 75 mg EC tablet TAKE 1 TAB BY MOUTH ONCE A DAY AS NEEDED. 10/21/20 6/24/21  Garrison Xiao MD       Social History     Socioeconomic History    Marital status:      Spouse name: Not on file    Number of children: Not on file    Years of education: Not on file    Highest education level: Not on file   Occupational History    Not on file   Tobacco Use    Smoking status: Never Smoker    Smokeless tobacco: Never Used   Vaping Use    Vaping Use: Never used   Substance and Sexual Activity    Alcohol use:  Yes     Alcohol/week: 1.7 - 3.3 standard drinks     Types: 2 - 4 Standard drinks or equivalent per week     Comment: OCCASIONALLY A GLASS OF WINE    Drug use: No    Sexual activity: Never   Other Topics Concern    Not on file   Social History Narrative    Not on file     Social Determinants of Health     Financial Resource Strain:     Difficulty of Paying Living Expenses:    Food Insecurity:  Worried About [facility] in the Last Year:    [address] in the Last Year:    Transportation Needs:     Lack of Transportation (Medical):  Lack of Transportation (Non-Medical):    Physical Activity:     Days of Exercise per Week:     Minutes of Exercise per Session:    Stress:     Feeling of Stress :    Social Connections:     Frequency of Communication with Friends and Family:     Frequency of Social Gatherings with Friends and Family:     Attends Latter-day Services:     Active Member of Clubs or Organizations:     Attends Club or Organization Meetings:     Marital Status:    Intimate Partner Violence:     Fear of Current or Ex-Partner:     Emotionally Abused:     Physically Abused:     Sexually Abused:           ROS  Per HPI. Some pain in the posterior aspect of the left ankle particularly with weightbearing. Visit Vitals  /62 (BP 1 Location: Left upper arm, BP Patient Position: Sitting, BP Cuff Size: Large adult)   Pulse 64   Temp 98.6 °F (37 °C) (Temporal)   Resp 18   Ht 5' 6\" (1.676 m)   Wt 172 lb 3.2 oz (78.1 kg)   SpO2 97%   BMI 27.79 kg/m²         Physical Exam   Physical Examination: General appearance - alert, well appearing, and in no distress  Chest - clear to auscultation, no wheezes, rales or rhonchi, symmetric air entry  Heart - normal rate and regular rhythm  Neurological - alert, oriented, normal speech, no focal findings or movement disorder noted  Musculoskeletal - no joint tenderness, deformity or swelling, abnormal exam of left ankle with tenderness across the Achilles bursa. No swelling or redness. Excellent pulses with no edema. Normal strength in the left lower extremity. Extremities - peripheral pulses normal, no pedal edema, no clubbing or cyanosis      Assessment/Plan:  Diagnoses and all orders for this visit:    1. Neuropathywe will increase Cymbalta to 30 mg a day and see if it helps her symptoms.   Likely playing a role in nerve root impingement or damage from the surgery. Should continue to improve. -     CBC WITH AUTOMATED DIFF; Future  -     METABOLIC PANEL, BASIC; Future    2. Essential hypertensionblood pressure well controlled. -     CBC WITH AUTOMATED DIFF; Future  -     METABOLIC PANEL, BASIC; Future    3. Fasting hyperglycemiarepeat blood sugar today. -     CBC WITH AUTOMATED DIFF; Future  -     METABOLIC PANEL, BASIC; Future    4. History of left hip replacementslowly improving. Discussed the importance of continuing her physical therapy after she completes home physical therapy. She will consider that and let me know her plan. 5.  Achilles tendinitiswe will treat with stretching exercises, Voltaren gel, and ice. Other orders  -     DULoxetine (CYMBALTA) 30 mg capsule; Take 1 Capsule by mouth daily. Advised her to call back or return to office if symptoms worsen/change/persist.  Discussed expected course/resolution/complications of diagnosis in detail with patient. Medication risks/benefits/costs/interactions/alternatives discussed with patient. She was given an after visit summary which includes diagnoses, current medications, & vitals. She expressed understanding with the diagnosis and plan.

## 2021-06-29 ENCOUNTER — PATIENT OUTREACH (OUTPATIENT)
Dept: CASE MANAGEMENT | Age: 81
End: 2021-06-29

## 2021-06-29 NOTE — PROGRESS NOTES
Patient has graduated from the Transitions of Care Coordination  program on 6/29/2021. Patient/family has the ability to self-manage at this time Care management goals have been completed. Patient was not referred to the [place] team for further management. Goals Addressed                 This Visit's Progress     COMPLETED: Prevent complications post hospitalization. 06/02/21  CTN contacted patient however she states that her therapist is present in the home, took CTN contact number to call when PT completed---mkrw    Patient returned call after her PT session. CTN reviewed the following to prevent complications post discharge:    Appts:    PCP Dr Bartholomew Alert declines setting up a Virtual Visit at this time, she states she does not have a smart phone or computer to use. She states that when she gets her staples out and can get more mobile she will call to schedule PCP appt herself. Ortho Dr Mary Jane Stallings states she has a follow up on 6/10/21     [facility] [address] ---pt reports PT came today to work with her. Patient also has OT ordered.  Patient reports that after surgery her foot and leg up to her knee became numb. She states Dr Ally Cool is aware, he feels that due to procedure taking a long time to perform ( using spinal anesthesia) and patient already having neuropathy that this is the reason for the numbness. They are hoping that in time the feeling will return. Patient states Dr Ally Cool was very attentive during hospitalization and checked on pt frequently.  Patient reports dressing was changed today by PT, reports no concerns or complications at this time with incision.  CTN reviewed written post op instructions to include s/sx DVT and constipation. 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 CTN instructed pt to increase fluids and to ambulate as much as tolerated.  CTN reviewed fall precautions with pt.  Patient states she is using walker and has a sister from out of town staying with her to assist.   CTN gave patient [facility] phone number and explained services. At time of call patient declined post discharge visit to be set up by CTN. CTN will follow up next week---damionrw    06/11/21  CTN notes per chart review patient was contacted By Brandon Templeton, ortho nurse navigator on 6/7/21. Patient reports numbness has subsided from leg but still has foot numbness. Patient has follow up with ortho Dr Irma Fowler today. CTN will follow up next week with patient after ortho visit for updates--mkrw    06/29/21  CTN spoke to patient who reports \"I'm doing very well as far as the hip goes, it's the foot numbness that hasn't come back yet. \" Patient states she is taking care of watching her foot when moving to ensure she does not fall or injure it. Patient states she attended appt with Dr Irma Fowler today---he is aware of foot numbness and thinks patient needs more time to recover. She sates she has follow up in 3 weeks. Per chart notes, pt attended PCP appt. Patient has no additional needs or concerns at this time, thanked CTN for call. Patient has had no readmission during 30 day NOREEN---[email or web address]       COMPLETED: Supportive resources in place to maintain patient in the community (ie. Home Health, DME equipment, refer to, medication assistant plan, etc.)        06/02/21  Patient asks CTN about how to get care aides if she needs assistance with bathing and help at home after her sister leaves to go back to  [facility]. CTN will to mail patient list of Geovanna Oconnell from our [employer] resource list.    Patient states during conversation that she has a long term care policy from [place]. CTN instructed patient to call them to see if they will provide care aide benefit and to find out if she can use any agency or if they have a list of contracted providers that she has to use. Patient states she will call them tomorrow. --mkrw                Patient has Care Transition Nurse's contact information for any further questions, concerns, or needs. Patients upcoming visits:  No future appointments.

## 2021-07-19 RX ORDER — DULOXETIN HYDROCHLORIDE 30 MG/1
30 CAPSULE, DELAYED RELEASE ORAL DAILY
Qty: 90 CAPSULE | Refills: 1 | Status: SHIPPED | OUTPATIENT
Start: 2021-07-19 | End: 2021-10-27 | Stop reason: DRUGHIGH

## 2021-07-19 NOTE — TELEPHONE ENCOUNTER
Requested Prescriptions     Pending Prescriptions Disp Refills    DULoxetine (CYMBALTA) 30 mg capsule 30 Capsule 1     Sig: Take 1 Capsule by mouth daily.        Requested Quantity : 90.0          [facility]pharmacy #[id number]- [place], [address] [facility]  [phone]

## 2021-07-28 ENCOUNTER — TRANSCRIBE ORDER (OUTPATIENT)
Dept: SCHEDULING | Age: 81
End: 2021-07-28

## 2021-07-28 DIAGNOSIS — M79.89 SWELLING OF LEFT LOWER EXTREMITY: ICD-10-CM

## 2021-07-28 DIAGNOSIS — Z96.642 STATUS POST LEFT HIP REPLACEMENT: ICD-10-CM

## 2021-07-28 DIAGNOSIS — M79.89 PAIN AND SWELLING OF LEFT LOWER LEG: Primary | ICD-10-CM

## 2021-07-28 DIAGNOSIS — M79.662 PAIN AND SWELLING OF LEFT LOWER LEG: Primary | ICD-10-CM

## 2021-07-29 ENCOUNTER — HOSPITAL ENCOUNTER (OUTPATIENT)
Dept: ULTRASOUND IMAGING | Age: 81
Discharge: HOME | End: 2021-07-29
Attending: ORTHOPAEDIC SURGERY
Payer: MEDICARE

## 2021-07-29 ENCOUNTER — OFFICE VISIT (OUTPATIENT)
Dept: INTERNAL MEDICINE CLINIC | Age: 81
End: 2021-07-29
Payer: MEDICARE

## 2021-07-29 VITALS
HEIGHT: 66 IN | RESPIRATION RATE: 14 BRPM | WEIGHT: 167 LBS | OXYGEN SATURATION: 98 % | HEART RATE: 71 BPM | BODY MASS INDEX: 26.84 KG/M2 | DIASTOLIC BLOOD PRESSURE: 66 MMHG | SYSTOLIC BLOOD PRESSURE: 131 MMHG | TEMPERATURE: 97.9 F

## 2021-07-29 DIAGNOSIS — I82.462 ACUTE DEEP VEIN THROMBOSIS (DVT) OF CALF MUSCLE VEIN OF LEFT LOWER EXTREMITY (HCC): ICD-10-CM

## 2021-07-29 DIAGNOSIS — M79.662 PAIN AND SWELLING OF LEFT LOWER LEG: ICD-10-CM

## 2021-07-29 DIAGNOSIS — E87.1 HYPONATREMIA: Primary | ICD-10-CM

## 2021-07-29 DIAGNOSIS — M79.89 PAIN AND SWELLING OF LEFT LOWER LEG: ICD-10-CM

## 2021-07-29 DIAGNOSIS — M79.89 SWELLING OF LEFT LOWER EXTREMITY: ICD-10-CM

## 2021-07-29 DIAGNOSIS — Z96.642 STATUS POST LEFT HIP REPLACEMENT: ICD-10-CM

## 2021-07-29 LAB
ANION GAP SERPL CALC-SCNC: 5 MMOL/L (ref 5–15)
BASOPHILS # BLD: 0.1 K/UL (ref 0–0.1)
BASOPHILS NFR BLD: 1 % (ref 0–1)
BUN SERPL-MCNC: 11 MG/DL (ref 6–20)
BUN/CREAT SERPL: 25 (ref 12–20)
CALCIUM SERPL-MCNC: 10.1 MG/DL (ref 8.5–10.1)
CHLORIDE SERPL-SCNC: 97 MMOL/L (ref 97–108)
CO2 SERPL-SCNC: 30 MMOL/L (ref 21–32)
CREAT SERPL-MCNC: 0.44 MG/DL (ref 0.55–1.02)
DIFFERENTIAL METHOD BLD: ABNORMAL
EOSINOPHIL # BLD: 0.1 K/UL (ref 0–0.4)
EOSINOPHIL NFR BLD: 1 % (ref 0–7)
ERYTHROCYTE [DISTWIDTH] IN BLOOD BY AUTOMATED COUNT: 12.7 % (ref 11.5–14.5)
GLUCOSE SERPL-MCNC: 87 MG/DL (ref 65–100)
HCT VFR BLD AUTO: 36.4 % (ref 35–47)
HGB BLD-MCNC: 11.4 G/DL (ref 11.5–16)
IMM GRANULOCYTES # BLD AUTO: 0 K/UL (ref 0–0.04)
IMM GRANULOCYTES NFR BLD AUTO: 0 % (ref 0–0.5)
LYMPHOCYTES # BLD: 1.8 K/UL (ref 0.8–3.5)
LYMPHOCYTES NFR BLD: 33 % (ref 12–49)
MCH RBC QN AUTO: 28.6 PG (ref 26–34)
MCHC RBC AUTO-ENTMCNC: 31.3 G/DL (ref 30–36.5)
MCV RBC AUTO: 91.5 FL (ref 80–99)
MONOCYTES # BLD: 0.4 K/UL (ref 0–1)
MONOCYTES NFR BLD: 8 % (ref 5–13)
NEUTS SEG # BLD: 3.2 K/UL (ref 1.8–8)
NEUTS SEG NFR BLD: 57 % (ref 32–75)
NRBC # BLD: 0 K/UL (ref 0–0.01)
NRBC BLD-RTO: 0 PER 100 WBC
PLATELET # BLD AUTO: 266 K/UL (ref 150–400)
PMV BLD AUTO: 11.1 FL (ref 8.9–12.9)
POTASSIUM SERPL-SCNC: 4.1 MMOL/L (ref 3.5–5.1)
RBC # BLD AUTO: 3.98 M/UL (ref 3.8–5.2)
SODIUM SERPL-SCNC: 132 MMOL/L (ref 136–145)
WBC # BLD AUTO: 5.6 K/UL (ref 3.6–11)

## 2021-07-29 PROCEDURE — G8536 NO DOC ELDER MAL SCRN: HCPCS | Performed by: INTERNAL MEDICINE

## 2021-07-29 PROCEDURE — G8399 PT W/DXA RESULTS DOCUMENT: HCPCS | Performed by: INTERNAL MEDICINE

## 2021-07-29 PROCEDURE — 1090F PRES/ABSN URINE INCON ASSESS: CPT | Performed by: INTERNAL MEDICINE

## 2021-07-29 PROCEDURE — G8752 SYS BP LESS 140: HCPCS | Performed by: INTERNAL MEDICINE

## 2021-07-29 PROCEDURE — G8754 DIAS BP LESS 90: HCPCS | Performed by: INTERNAL MEDICINE

## 2021-07-29 PROCEDURE — G8510 SCR DEP NEG, NO PLAN REQD: HCPCS | Performed by: INTERNAL MEDICINE

## 2021-07-29 PROCEDURE — 93971 EXTREMITY STUDY: CPT

## 2021-07-29 PROCEDURE — G8427 DOCREV CUR MEDS BY ELIG CLIN: HCPCS | Performed by: INTERNAL MEDICINE

## 2021-07-29 PROCEDURE — G8419 CALC BMI OUT NRM PARAM NOF/U: HCPCS | Performed by: INTERNAL MEDICINE

## 2021-07-29 PROCEDURE — 1100F PTFALLS ASSESS-DOCD GE2>/YR: CPT | Performed by: INTERNAL MEDICINE

## 2021-07-29 PROCEDURE — 3288F FALL RISK ASSESSMENT DOCD: CPT | Performed by: INTERNAL MEDICINE

## 2021-07-29 PROCEDURE — 99214 OFFICE O/P EST MOD 30 MIN: CPT | Performed by: INTERNAL MEDICINE

## 2021-07-29 PROCEDURE — G0463 HOSPITAL OUTPT CLINIC VISIT: HCPCS | Performed by: INTERNAL MEDICINE

## 2021-07-29 RX ORDER — RIVAROXABAN 15 MG-20MG
KIT ORAL
Qty: 1 DOSE PACK | Refills: 0 | Status: SHIPPED | COMMUNITY
Start: 2021-07-29 | End: 2021-08-25 | Stop reason: DRUGHIGH

## 2021-07-29 RX ORDER — RIVAROXABAN 15 MG-20MG
KIT ORAL
Qty: 1 DOSE PACK | Refills: 0 | Status: SHIPPED | OUTPATIENT
Start: 2021-07-29 | End: 2021-08-25 | Stop reason: SDUPTHER

## 2021-07-29 NOTE — PROGRESS NOTES
HPI:  Tano Myers is a 78y.o. year old female who is here for a clot that was found today in her left gastrocnemius. She had a previous left hip replacement and had numbness and tingling in her left foot since then. She has had some edema in both legs. Over the last 3 to 4 days she has had increasing swelling and redness in the left ankle. The orthopedist ordered Doppler that revealed a deep venous thrombosis. She denies any fevers or chills. Denies any bleeding. Denies any melena or hematochezia. Denies any change in bowel or bladder habits. She does have chronic numbness and tingling in the legs and swelling as above. Past Medical History:   Diagnosis Date    Arthritis     DDD BACK PAIN    Chest pain, atypical     Chronic pain     HIP    Diverticulitis of colon     Fibrocystic disease of breast     GERD (gastroesophageal reflux disease)     Hypercholesterolemia     Hypertension     IBS (irritable bowel syndrome)     Menopause 1968    Neuropathy     Ovarian cyst        Past Surgical History:   Procedure Laterality Date    ENDOSCOPY, COLON, DIAGNOSTIC  10/22/2010    5 yr fu, Brand    HX APPENDECTOMY      HX COLONOSCOPY  10/20/2016    Dr. Narciso Rivers - 5 yr f/u    HX DILATION AND CURETTAGE      HX HIP ARTHROSCOPY      HX TONSILLECTOMY         Prior to Admission medications    Medication Sig Start Date End Date Taking? Authorizing Provider   rivaroxaban (Xarelto DVT-PE Treat 30d Start) 15 mg (42)- 20 mg (9) DsPk Take one 15 mg tablet twice a day with food for the first 21 days. Then, take one 20 mg tablet once a day with food for 9 days. 7/29/21  Yes Sarah Campbell MD   rivaroxaban (Xarelto DVT-PE Treat 30d Start) 15 mg (42)- 20 mg (9) DsPk Take one 15 mg tablet twice a day with food for the first 21 days. Then, take one 20 mg tablet once a day with food for 9 days. 7/29/21  Yes Sarah Campbell MD   DULoxetine (CYMBALTA) 30 mg capsule Take 1 Capsule by mouth daily.  7/19/21  Yes Layla Arias MD   vitamin B complex capsule Take 1 Capsule by mouth daily. 6/24/21  Yes Layla Arias MD   diclofenac (VOLTAREN) 1 % gel Apply 2 g to affected area four (4) times daily. 6/24/21  Yes Layla Arias MD   OTHER JOINT CREAM   Yes Provider, Historical   diphenhydrAMINE (BenadryL) 25 mg capsule Take 25 mg by mouth nightly as needed for Allergies. Yes Provider, Historical   zolpidem (AMBIEN) 5 mg tablet TAKE 1/2 TO 1 TABLET BY MOUTH AT BEDTIME AS NEEDED 5/11/21  Yes Layla Arias MD   bisoprolol-hydroCHLOROthiazide [facility]) 5-6.25 mg per tablet TAKE 1 TABLET BY MOUTH EVERY DAY 4/7/21  Yes Layla Arias MD   Uro--10-40.8-36 mg cap capsule TAKE 1 CAP BY MOUTH FOUR (4) TIMES DAILY AS NEEDED FOR PAIN. 1/26/21  Yes Layla Arias MD   acetaminophen (Tylenol Extra Strength) 500 mg tablet Take  by mouth every six (6) hours as needed for Pain. Yes Provider, Historical   cimetidine (Tagamet HB) 200 mg tab Take 200 mg by mouth as needed. Yes Provider, Historical   calcium carb/vit D3/minerals (CALCIUM-VITAMIN D PO) Take  by mouth. Yes Provider, Historical   B.infantis-B.ani-B.long-B.bifi (PROBIOTIC 4X) 10-15 mg TbEC Take  by mouth. Yes Provider, Historical       Social History     Socioeconomic History    Marital status:      Spouse name: Not on file    Number of children: Not on file    Years of education: Not on file    Highest education level: Not on file   Occupational History    Not on file   Tobacco Use    Smoking status: Never Smoker    Smokeless tobacco: Never Used   Vaping Use    Vaping Use: Never used   Substance and Sexual Activity    Alcohol use:  Yes     Alcohol/week: 1.7 - 3.3 standard drinks     Types: 2 - 4 Standard drinks or equivalent per week     Comment: OCCASIONALLY A GLASS OF WINE    Drug use: No    Sexual activity: Never   Other Topics Concern    Not on file   Social History Narrative    Not on file     Social Determinants of Health     Financial Resource Strain:     Difficulty of Paying Living Expenses:    Food Insecurity:     Worried About Running Out of Food in the Last Year:     [address] in the Last Year:    Transportation Needs:     Lack of Transportation (Medical):  Lack of Transportation (Non-Medical):    Physical Activity:     Days of Exercise per Week:     Minutes of Exercise per Session:    Stress:     Feeling of Stress :    Social Connections:     Frequency of Communication with Friends and Family:     Frequency of Social Gatherings with Friends and Family:     Attends Episcopal Services:     Active Member of Clubs or Organizations:     Attends Club or Organization Meetings:     Marital Status:    Intimate Partner Violence:     Fear of Current or Ex-Partner:     Emotionally Abused:     Physically Abused:     Sexually Abused:           ROS  Per HPI. Continues to do PT with home PT. Visit Vitals  /66   Pulse 71   Temp 97.9 °F (36.6 °C) (Temporal)   Resp 14   Ht 5' 6\" (1.676 m)   Wt 167 lb (75.8 kg)   SpO2 98%   BMI 26.95 kg/m²         Physical Exam   Physical Examination: General appearance - alert, well appearing, and in no distress  Chest - clear to auscultation, no wheezes, rales or rhonchi, symmetric air entry  Heart - normal rate and regular rhythm  Extremities -there is 1+ edema on the left. No calf tenderness. Some mild redness on the lateral aspect of the ankle. Pulses are 2+ and equal bilaterally. Assessment/Plan:  Diagnoses and all orders for this visit:    1. Hyponatremia documented on recent labs. Repeat BMP today to be sure the sodium has improved. -     METABOLIC PANEL, BASIC; Future    2. Acute deep vein thrombosis (DVT) of calf muscle vein of left lower extremity (HCC) does have a family history of clots. Her mother  from an acute clot in the hospital.  Will check labs to look for hereditary thrombophilias.   We had a long discussion today about the risk of newer anticoagulants versus warfarin and Lovenox. After much discussion, have elected to do Xarelto and she will follow up here in 3 months and do 3 months of anticoagulation in the interim. She was warned about the potential side effects including what to look for for bleeding and let me know if the symptoms develop. -     rivaroxaban (Xarelto DVT-PE Treat 30d Start) 15 mg (42)- 20 mg (9) DsPk; Take one 15 mg tablet twice a day with food for the first 21 days. Then, take one 20 mg tablet once a day with food for 9 days.  -     PROTEIN C ACTIVITY; Future  -     PROTEIN S PANEL; Future  -     ANTITHROMBIN III PANEL; Future  -     FACTOR V LEIDEN; Future  -     FACTOR II  DNA ANALYSIS; Future  -     CBC WITH AUTOMATED DIFF; Future  . Recent hip fracturewe will take a 2-day hiatus from therapy and then return to her usual activities. Other orders  -     rivaroxaban (Xarelto DVT-PE Treat 30d Start) 15 mg (42)- 20 mg (9) DsPk; Take one 15 mg tablet twice a day with food for the first 21 days. Then, take one 20 mg tablet once a day with food for 9 days. Advised her to call back or return to office if symptoms worsen/change/persist.  Discussed expected course/resolution/complications of diagnosis in detail with patient. Medication risks/benefits/costs/interactions/alternatives discussed with patient. She was given an after visit summary which includes diagnoses, current medications, & vitals. She expressed understanding with the diagnosis and plan.

## 2021-07-31 LAB
AT III AG PPP IA-ACNC: 95 % (ref 72–124)
AT III PPP CHRO-ACNC: 104 % (ref 75–135)
PROT C PPP-ACNC: 125 % (ref 73–180)
PROT S ACT/NOR PPP: 110 % (ref 63–140)
PROT S AG ACT/NOR PPP IA: 99 % (ref 60–150)
PROT S FREE AG ACT/NOR PPP IA: 126 % (ref 57–157)

## 2021-08-03 LAB
COMMENT, 511217: NORMAL
F5 GENE MUT ANL BLD/T: NORMAL

## 2021-08-04 LAB
ADDITIONAL INFORMATION 480297: NORMAL
F2 GENE MUT ANL BLD/T: NORMAL

## 2021-08-06 DIAGNOSIS — I10 ESSENTIAL HYPERTENSION: ICD-10-CM

## 2021-08-06 RX ORDER — BISOPROLOL FUMARATE AND HYDROCHLOROTHIAZIDE 5; 6.25 MG/1; MG/1
TABLET ORAL
Qty: 90 TABLET | Refills: 0 | Status: SHIPPED | OUTPATIENT
Start: 2021-08-06 | End: 2021-09-30

## 2021-08-24 RX ORDER — RIVAROXABAN 15 MG-20MG
KIT ORAL
Qty: 1 DOSE PACK | Refills: 0 | Status: CANCELLED | OUTPATIENT
Start: 2021-08-24

## 2021-09-13 RX ORDER — METHENAMINE, SODIUM PHOSPHATE, MONOBASIC, ANHYDROUS, PHENYL SALICYLATE, METHYLENE BLUE AND HYOSCYAMINE SULFATE 118; 40.8; 36; 10; .12 MG/1; MG/1; MG/1; MG/1; MG/1
CAPSULE ORAL
Qty: 60 CAPSULE | Refills: 3 | Status: SHIPPED | OUTPATIENT
Start: 2021-09-13

## 2021-09-17 RX ORDER — RIVAROXABAN 20 MG/1
TABLET, FILM COATED ORAL
Qty: 30 TABLET | Refills: 1 | Status: SHIPPED | OUTPATIENT
Start: 2021-09-17 | End: 2021-10-11

## 2021-09-30 DIAGNOSIS — I10 ESSENTIAL HYPERTENSION: ICD-10-CM

## 2021-09-30 RX ORDER — BISOPROLOL FUMARATE AND HYDROCHLOROTHIAZIDE 5; 6.25 MG/1; MG/1
TABLET ORAL
Qty: 90 TABLET | Refills: 0 | Status: SHIPPED | OUTPATIENT
Start: 2021-09-30 | End: 2022-02-22

## 2021-10-11 RX ORDER — RIVAROXABAN 20 MG/1
TABLET, FILM COATED ORAL
Qty: 30 TABLET | Refills: 1 | Status: SHIPPED | OUTPATIENT
Start: 2021-10-11 | End: 2021-11-07

## 2021-10-27 ENCOUNTER — OFFICE VISIT (OUTPATIENT)
Dept: INTERNAL MEDICINE CLINIC | Age: 81
End: 2021-10-27
Payer: MEDICARE

## 2021-10-27 VITALS
TEMPERATURE: 97.8 F | OXYGEN SATURATION: 97 % | WEIGHT: 170 LBS | RESPIRATION RATE: 16 BRPM | BODY MASS INDEX: 27.32 KG/M2 | HEIGHT: 66 IN | DIASTOLIC BLOOD PRESSURE: 84 MMHG | HEART RATE: 69 BPM | SYSTOLIC BLOOD PRESSURE: 134 MMHG

## 2021-10-27 DIAGNOSIS — G62.9 NEUROPATHY: ICD-10-CM

## 2021-10-27 DIAGNOSIS — M17.11 ARTHRITIS OF KNEE, RIGHT: ICD-10-CM

## 2021-10-27 DIAGNOSIS — I10 ESSENTIAL HYPERTENSION: Primary | ICD-10-CM

## 2021-10-27 PROCEDURE — G8419 CALC BMI OUT NRM PARAM NOF/U: HCPCS | Performed by: INTERNAL MEDICINE

## 2021-10-27 PROCEDURE — G8752 SYS BP LESS 140: HCPCS | Performed by: INTERNAL MEDICINE

## 2021-10-27 PROCEDURE — 1101F PT FALLS ASSESS-DOCD LE1/YR: CPT | Performed by: INTERNAL MEDICINE

## 2021-10-27 PROCEDURE — G8754 DIAS BP LESS 90: HCPCS | Performed by: INTERNAL MEDICINE

## 2021-10-27 PROCEDURE — 99214 OFFICE O/P EST MOD 30 MIN: CPT | Performed by: INTERNAL MEDICINE

## 2021-10-27 PROCEDURE — G8510 SCR DEP NEG, NO PLAN REQD: HCPCS | Performed by: INTERNAL MEDICINE

## 2021-10-27 PROCEDURE — G8536 NO DOC ELDER MAL SCRN: HCPCS | Performed by: INTERNAL MEDICINE

## 2021-10-27 PROCEDURE — G8399 PT W/DXA RESULTS DOCUMENT: HCPCS | Performed by: INTERNAL MEDICINE

## 2021-10-27 PROCEDURE — 1090F PRES/ABSN URINE INCON ASSESS: CPT | Performed by: INTERNAL MEDICINE

## 2021-10-27 PROCEDURE — G8427 DOCREV CUR MEDS BY ELIG CLIN: HCPCS | Performed by: INTERNAL MEDICINE

## 2021-10-27 PROCEDURE — G0463 HOSPITAL OUTPT CLINIC VISIT: HCPCS | Performed by: INTERNAL MEDICINE

## 2021-10-27 RX ORDER — NABUMETONE 500 MG/1
500 TABLET, FILM COATED ORAL 2 TIMES DAILY
COMMUNITY
End: 2021-10-27 | Stop reason: ALTCHOICE

## 2021-10-27 RX ORDER — TRAMADOL HYDROCHLORIDE 50 MG/1
50 TABLET ORAL
COMMUNITY

## 2021-10-27 RX ORDER — DULOXETIN HYDROCHLORIDE 60 MG/1
60 CAPSULE, DELAYED RELEASE ORAL DAILY
Qty: 30 CAPSULE | Refills: 5 | Status: SHIPPED | OUTPATIENT
Start: 2021-10-27 | End: 2021-12-01 | Stop reason: SDUPTHER

## 2021-10-28 NOTE — PROGRESS NOTES
HPI:  Bonilla Mccloud is a 78y.o. year old female who is here for a follow up visit. Some right knee pain now off and on. No locking or popping or giving away. Ongoing left foot numbness and pain as well. Taking little ultram now. Bowel movements are better. No change in bladder issues. Is not clear if the cymbalta has helped or not at the current dose. Past Medical History:   Diagnosis Date    Arthritis     DDD BACK PAIN    Chest pain, atypical     Chronic pain     HIP    Diverticulitis of colon     Fibrocystic disease of breast     GERD (gastroesophageal reflux disease)     Hypercholesterolemia     Hypertension     IBS (irritable bowel syndrome)     Menopause 1968    Neuropathy     Ovarian cyst        Past Surgical History:   Procedure Laterality Date    ENDOSCOPY, COLON, DIAGNOSTIC  10/22/2010    5 yr fu, Brand    HX APPENDECTOMY      HX COLONOSCOPY  10/20/2016    Dr. Jesus Mcdaniel - 5 yr f/u    HX DILATION AND CURETTAGE      HX HIP ARTHROSCOPY      HX TONSILLECTOMY         Prior to Admission medications    Medication Sig Start Date End Date Taking? Authorizing Provider   traMADoL (ULTRAM) 50 mg tablet Take 50 mg by mouth every six (6) hours as needed for Pain. Yes Provider, Historical   DULoxetine (CYMBALTA) 60 mg capsule Take 1 Capsule by mouth daily. 10/27/21  Yes Felipa Schlatter, MD   Xarelto 20 mg tab tablet TAKE 1 TABLET BY MOUTH EVERY DAY 10/11/21  Yes Felipa Schlatter, MD   bisoprolol-hydroCHLOROthiazide [facility]) 5-6.25 mg per tablet TAKE 1 TABLET BY MOUTH EVERY DAY 9/30/21  Yes wGyn Marshall III, MD   Uro--10-40.8-36 mg cap capsule TAKE 1 CAP BY MOUTH FOUR (4) TIMES DAILY AS NEEDED FOR PAIN. 9/13/21  Yes Felipa Schlatter, MD   vitamin B complex capsule Take 1 Capsule by mouth daily. 6/24/21  Yes Felipa Schlatter, MD   diclofenac (VOLTAREN) 1 % gel Apply 2 g to affected area four (4) times daily.  6/24/21  Yes Felipa Schlatter, MD   diphenhydrAMINE (BenadryL) 25 mg capsule Take 25 mg by mouth nightly as needed for Allergies. Yes Provider, Historical   zolpidem (AMBIEN) 5 mg tablet TAKE 1/2 TO 1 TABLET BY MOUTH AT BEDTIME AS NEEDED 5/11/21  Yes Anthony Farr III, MD   acetaminophen (Tylenol Extra Strength) 500 mg tablet Take  by mouth every six (6) hours as needed for Pain. Yes Provider, Historical   cimetidine (Tagamet HB) 200 mg tab Take 200 mg by mouth as needed. Yes Provider, Historical   calcium carb/vit D3/minerals (CALCIUM-VITAMIN D PO) Take  by mouth. Yes Provider, Historical   B.infantis-B.ani-B.long-B.bifi (PROBIOTIC 4X) 10-15 mg TbEC Take  by mouth. Yes Provider, Historical   nabumetone (RELAFEN) 500 mg tablet Take 500 mg by mouth two (2) times a day. 10/27/21  Provider, Historical   DULoxetine (CYMBALTA) 30 mg capsule Take 1 Capsule by mouth daily. 7/19/21 10/27/21  Damien Mckeon MD   OTHER JOINT CREAM  Patient not taking: Reported on 10/27/2021    Provider, Historical       Social History     Socioeconomic History    Marital status:      Spouse name: Not on file    Number of children: Not on file    Years of education: Not on file    Highest education level: Not on file   Occupational History    Not on file   Tobacco Use    Smoking status: Never Smoker    Smokeless tobacco: Never Used   Vaping Use    Vaping Use: Never used   Substance and Sexual Activity    Alcohol use: Yes     Alcohol/week: 1.7 - 3.3 standard drinks     Types: 2 - 4 Standard drinks or equivalent per week     Comment: OCCASIONALLY A GLASS OF WINE    Drug use: No    Sexual activity: Never   Other Topics Concern    Not on file   Social History Narrative    Not on file     Social Determinants of Health     Financial Resource Strain:     Difficulty of Paying Living Expenses:    Food Insecurity:     Worried About Running Out of Food in the Last Year:     [address] in the Last Year:    Transportation Needs:     Lack of Transportation (Medical):      Lack of Transportation (Non-Medical):    Physical Activity:     Days of Exercise per Week:     Minutes of Exercise per Session:    Stress:     Feeling of Stress :    Social Connections:     Frequency of Communication with Friends and Family:     Frequency of Social Gatherings with Friends and Family:     Attends Judaism Services:     Active Member of Clubs or Organizations:     Attends Club or Organization Meetings:     Marital Status:    Intimate Partner Violence:     Fear of Current or Ex-Partner:     Emotionally Abused:     Physically Abused:     Sexually Abused:           ROS  Per HPI    Visit Vitals  /84   Pulse 69   Temp 97.8 °F (36.6 °C) (Temporal)   Resp 16   Ht 5' 6\" (1.676 m)   Wt 170 lb (77.1 kg)   SpO2 97%   BMI 27.44 kg/m²         Physical Exam   Physical Examination: General appearance - alert, well appearing, and in no distress  Chest - clear to auscultation, no wheezes, rales or rhonchi, symmetric air entry  Heart - normal rate and regular rhythm  Neurological - alert, oriented, normal speech, no focal findings or movement disorder noted  Musculoskeletal - abnormal exam of right knee with medial joint line tenderness. NO effusion. Mild crepitus. Extremities - peripheral pulses normal, no pedal edema, no clubbing or cyanosis      Assessment/Plan:  Diagnoses and all orders for this visit:    1. Essential hypertensionblood pressure well controlled. We will continue current medications for that now. 2. Neuropathydoes not appear to be controlled. She will increase her Cymbalta to 60 mg a day 1 dose in the morning time. Report how she is doing in 1 month. 3. Arthritis of knee, rightTylenol as needed. See orthopedics as needed as well. Other orders  -     DULoxetine (CYMBALTA) 60 mg capsule; Take 1 Capsule by mouth daily.               Advised her to call back or return to office if symptoms worsen/change/persist.  Discussed expected course/resolution/complications of diagnosis in detail with patient. Medication risks/benefits/costs/interactions/alternatives discussed with patient. She was given an after visit summary which includes diagnoses, current medications, & vitals. She expressed understanding with the diagnosis and plan.

## 2021-11-07 RX ORDER — RIVAROXABAN 20 MG/1
TABLET, FILM COATED ORAL
Qty: 30 TABLET | Refills: 1 | Status: SHIPPED | OUTPATIENT
Start: 2021-11-07 | End: 2021-12-27

## 2021-12-01 NOTE — TELEPHONE ENCOUNTER
Requested Prescriptions     Pending Prescriptions Disp Refills    DULoxetine (CYMBALTA) 60 mg capsule 30 Capsule 5     Sig: Take 1 Capsule by mouth daily.      [facility]/pharmacy #[id number]- [address], [address]  [phone]

## 2021-12-02 ENCOUNTER — TRANSCRIBE ORDER (OUTPATIENT)
Dept: SCHEDULING | Age: 81
End: 2021-12-02

## 2021-12-02 DIAGNOSIS — M25.551 RIGHT HIP PAIN: Primary | ICD-10-CM

## 2021-12-02 RX ORDER — DULOXETIN HYDROCHLORIDE 60 MG/1
60 CAPSULE, DELAYED RELEASE ORAL DAILY
Qty: 30 CAPSULE | Refills: 5 | Status: SHIPPED | OUTPATIENT
Start: 2021-12-02 | End: 2021-12-03 | Stop reason: SDUPTHER

## 2021-12-02 NOTE — TELEPHONE ENCOUNTER
Chief Complaint   Patient presents with    Medication Refill     Last Appointment with Dr. Joann Yin:  10/27/2021  Future Appointments   Date Time Provider Bushra Hunt   4/27/2022  1:15 PM MD REYMUNDO Castrejon AMB

## 2021-12-03 ENCOUNTER — HOSPITAL ENCOUNTER (OUTPATIENT)
Dept: MRI IMAGING | Age: 81
Discharge: HOME | End: 2021-12-03
Attending: ORTHOPAEDIC SURGERY
Payer: MEDICARE

## 2021-12-03 DIAGNOSIS — M25.551 RIGHT HIP PAIN: ICD-10-CM

## 2021-12-03 PROCEDURE — 73721 MRI JNT OF LWR EXTRE W/O DYE: CPT

## 2021-12-03 NOTE — TELEPHONE ENCOUNTER
Requested Prescriptions     Pending Prescriptions Disp Refills    DULoxetine (CYMBALTA) 60 mg capsule 30 Capsule 5     Sig: Take 1 Capsule by mouth daily.              Requested quantity: 90.0          [facility]/pharmacy #[id number]- [place], [address]  [phone]

## 2021-12-06 RX ORDER — DULOXETIN HYDROCHLORIDE 60 MG/1
60 CAPSULE, DELAYED RELEASE ORAL DAILY
Qty: 30 CAPSULE | Refills: 5 | Status: SHIPPED | OUTPATIENT
Start: 2021-12-06 | End: 2022-10-17 | Stop reason: SDUPTHER

## 2021-12-27 RX ORDER — RIVAROXABAN 20 MG/1
TABLET, FILM COATED ORAL
Qty: 30 TABLET | Refills: 1 | Status: SHIPPED | OUTPATIENT
Start: 2021-12-27 | End: 2022-04-27 | Stop reason: ALTCHOICE

## 2021-12-27 NOTE — TELEPHONE ENCOUNTER
Chief Complaint   Patient presents with    Medication Refill     Last Appointment with Dr. Jesu Ricketts:  10/27/2021  Future Appointments   Date Time Provider Bushra Hunt   4/27/2022  1:15 PM MD REYMUNDO Stoddard AMB

## 2022-01-12 DIAGNOSIS — F51.01 PRIMARY INSOMNIA: ICD-10-CM

## 2022-01-12 RX ORDER — ZOLPIDEM TARTRATE 5 MG/1
TABLET ORAL
Qty: 30 TABLET | Refills: 1 | Status: SHIPPED | OUTPATIENT
Start: 2022-01-12

## 2022-02-21 ENCOUNTER — TRANSCRIBE ORDER (OUTPATIENT)
Dept: SCHEDULING | Age: 82
End: 2022-02-21

## 2022-02-21 DIAGNOSIS — Z12.31 OTHER SCREENING MAMMOGRAM: Primary | ICD-10-CM

## 2022-02-22 DIAGNOSIS — I10 ESSENTIAL HYPERTENSION: ICD-10-CM

## 2022-02-22 RX ORDER — BISOPROLOL FUMARATE AND HYDROCHLOROTHIAZIDE 5; 6.25 MG/1; MG/1
TABLET ORAL
Qty: 90 TABLET | Refills: 0 | Status: SHIPPED | OUTPATIENT
Start: 2022-02-22 | End: 2022-05-25

## 2022-03-16 ENCOUNTER — HOSPITAL ENCOUNTER (OUTPATIENT)
Dept: MAMMOGRAPHY | Age: 82
Discharge: HOME | End: 2022-03-16
Attending: INTERNAL MEDICINE
Payer: MEDICARE

## 2022-03-16 DIAGNOSIS — Z12.31 OTHER SCREENING MAMMOGRAM: ICD-10-CM

## 2022-03-16 PROCEDURE — 77067 SCR MAMMO BI INCL CAD: CPT

## 2022-03-18 PROBLEM — M16.12 PRIMARY LOCALIZED OSTEOARTHRITIS OF LEFT HIP: Status: ACTIVE | Noted: 2021-05-18

## 2022-03-18 PROBLEM — I48.0 PAROXYSMAL ATRIAL FIBRILLATION (HCC): Status: ACTIVE | Noted: 2021-05-27

## 2022-04-27 ENCOUNTER — OFFICE VISIT (OUTPATIENT)
Dept: INTERNAL MEDICINE CLINIC | Age: 82
End: 2022-04-27
Payer: MEDICARE

## 2022-04-27 VITALS
RESPIRATION RATE: 16 BRPM | TEMPERATURE: 98 F | HEIGHT: 66 IN | OXYGEN SATURATION: 97 % | WEIGHT: 172 LBS | HEART RATE: 68 BPM | DIASTOLIC BLOOD PRESSURE: 78 MMHG | SYSTOLIC BLOOD PRESSURE: 138 MMHG | BODY MASS INDEX: 27.64 KG/M2

## 2022-04-27 DIAGNOSIS — R73.01 FASTING HYPERGLYCEMIA: ICD-10-CM

## 2022-04-27 DIAGNOSIS — G62.9 NEUROPATHY: Primary | ICD-10-CM

## 2022-04-27 DIAGNOSIS — I10 ESSENTIAL HYPERTENSION: ICD-10-CM

## 2022-04-27 DIAGNOSIS — E87.1 HYPONATREMIA: ICD-10-CM

## 2022-04-27 DIAGNOSIS — I48.0 PAROXYSMAL ATRIAL FIBRILLATION (HCC): ICD-10-CM

## 2022-04-27 LAB
ALBUMIN SERPL-MCNC: 4.5 G/DL (ref 3.5–5)
ALBUMIN/GLOB SERPL: 1.7 {RATIO} (ref 1.1–2.2)
ALP SERPL-CCNC: 79 U/L (ref 45–117)
ALT SERPL-CCNC: 24 U/L (ref 12–78)
ANION GAP SERPL CALC-SCNC: 3 MMOL/L (ref 5–15)
AST SERPL-CCNC: 15 U/L (ref 15–37)
BASOPHILS # BLD: 0 K/UL (ref 0–0.1)
BASOPHILS NFR BLD: 1 % (ref 0–1)
BILIRUB SERPL-MCNC: 0.5 MG/DL (ref 0.2–1)
BUN SERPL-MCNC: 12 MG/DL (ref 6–20)
BUN/CREAT SERPL: 19 (ref 12–20)
CALCIUM SERPL-MCNC: 10.3 MG/DL (ref 8.5–10.1)
CHLORIDE SERPL-SCNC: 100 MMOL/L (ref 97–108)
CHOLEST SERPL-MCNC: 239 MG/DL
CO2 SERPL-SCNC: 31 MMOL/L (ref 21–32)
CREAT SERPL-MCNC: 0.62 MG/DL (ref 0.55–1.02)
DIFFERENTIAL METHOD BLD: NORMAL
EOSINOPHIL # BLD: 0.1 K/UL (ref 0–0.4)
EOSINOPHIL NFR BLD: 2 % (ref 0–7)
ERYTHROCYTE [DISTWIDTH] IN BLOOD BY AUTOMATED COUNT: 13 % (ref 11.5–14.5)
GLOBULIN SER CALC-MCNC: 2.7 G/DL (ref 2–4)
GLUCOSE SERPL-MCNC: 86 MG/DL (ref 65–100)
HCT VFR BLD AUTO: 40.3 % (ref 35–47)
HDLC SERPL-MCNC: 63 MG/DL
HDLC SERPL: 3.8 {RATIO} (ref 0–5)
HGB BLD-MCNC: 12.9 G/DL (ref 11.5–16)
IMM GRANULOCYTES # BLD AUTO: 0 K/UL (ref 0–0.04)
IMM GRANULOCYTES NFR BLD AUTO: 0 % (ref 0–0.5)
LDLC SERPL CALC-MCNC: 156.8 MG/DL (ref 0–100)
LYMPHOCYTES # BLD: 1.6 K/UL (ref 0.8–3.5)
LYMPHOCYTES NFR BLD: 27 % (ref 12–49)
MCH RBC QN AUTO: 29 PG (ref 26–34)
MCHC RBC AUTO-ENTMCNC: 32 G/DL (ref 30–36.5)
MCV RBC AUTO: 90.6 FL (ref 80–99)
MONOCYTES # BLD: 0.6 K/UL (ref 0–1)
MONOCYTES NFR BLD: 10 % (ref 5–13)
NEUTS SEG # BLD: 3.5 K/UL (ref 1.8–8)
NEUTS SEG NFR BLD: 60 % (ref 32–75)
NRBC # BLD: 0 K/UL (ref 0–0.01)
NRBC BLD-RTO: 0 PER 100 WBC
PLATELET # BLD AUTO: 256 K/UL (ref 150–400)
PMV BLD AUTO: 11.1 FL (ref 8.9–12.9)
POTASSIUM SERPL-SCNC: 4.7 MMOL/L (ref 3.5–5.1)
PROT SERPL-MCNC: 7.2 G/DL (ref 6.4–8.2)
RBC # BLD AUTO: 4.45 M/UL (ref 3.8–5.2)
SODIUM SERPL-SCNC: 134 MMOL/L (ref 136–145)
TRIGL SERPL-MCNC: 96 MG/DL (ref ?–150)
VLDLC SERPL CALC-MCNC: 19.2 MG/DL
WBC # BLD AUTO: 5.8 K/UL (ref 3.6–11)

## 2022-04-27 PROCEDURE — G8752 SYS BP LESS 140: HCPCS | Performed by: INTERNAL MEDICINE

## 2022-04-27 PROCEDURE — G8510 SCR DEP NEG, NO PLAN REQD: HCPCS | Performed by: INTERNAL MEDICINE

## 2022-04-27 PROCEDURE — 1090F PRES/ABSN URINE INCON ASSESS: CPT | Performed by: INTERNAL MEDICINE

## 2022-04-27 PROCEDURE — 1101F PT FALLS ASSESS-DOCD LE1/YR: CPT | Performed by: INTERNAL MEDICINE

## 2022-04-27 PROCEDURE — 99214 OFFICE O/P EST MOD 30 MIN: CPT | Performed by: INTERNAL MEDICINE

## 2022-04-27 PROCEDURE — G8419 CALC BMI OUT NRM PARAM NOF/U: HCPCS | Performed by: INTERNAL MEDICINE

## 2022-04-27 PROCEDURE — G8754 DIAS BP LESS 90: HCPCS | Performed by: INTERNAL MEDICINE

## 2022-04-27 PROCEDURE — G0463 HOSPITAL OUTPT CLINIC VISIT: HCPCS | Performed by: INTERNAL MEDICINE

## 2022-04-27 PROCEDURE — G8427 DOCREV CUR MEDS BY ELIG CLIN: HCPCS | Performed by: INTERNAL MEDICINE

## 2022-04-27 PROCEDURE — G8399 PT W/DXA RESULTS DOCUMENT: HCPCS | Performed by: INTERNAL MEDICINE

## 2022-04-27 PROCEDURE — G8536 NO DOC ELDER MAL SCRN: HCPCS | Performed by: INTERNAL MEDICINE

## 2022-04-27 RX ORDER — NABUMETONE 500 MG/1
500 TABLET, FILM COATED ORAL 2 TIMES DAILY
COMMUNITY

## 2022-04-27 NOTE — PROGRESS NOTES
HPI:  Moon Rowell is a 78y.o. year old female who is here for a follow up visit. Has been feeling reasonably well. Some numbness and tingling in the left foot but no significant pain. Some ongoing issues with swelling there. Some pain in the right ankle. No fevers or chills. No chest pains. Some occasional palpitations that last less than 1 minute. No change in bowel or bladder habits. She does not want to go back to see the neurologist as she did not feel that it was helpful. Past Medical History:   Diagnosis Date    Arthritis     DDD BACK PAIN    Chest pain, atypical     Chronic pain     HIP    Diverticulitis of colon     Fibrocystic disease of breast     GERD (gastroesophageal reflux disease)     Hypercholesterolemia     Hypertension     IBS (irritable bowel syndrome)     Menopause 1968    Neuropathy     Ovarian cyst        Past Surgical History:   Procedure Laterality Date    ENDOSCOPY, COLON, DIAGNOSTIC  10/22/2010    5 yr fu, Brand    HX APPENDECTOMY      HX COLONOSCOPY  10/20/2016    Dr. German Sees - 5 yr f/u    HX DILATION AND CURETTAGE      HX HIP ARTHROSCOPY      HX TONSILLECTOMY         Prior to Admission medications    Medication Sig Start Date End Date Taking? Authorizing Provider   nabumetone (RELAFEN) 500 mg tablet Take 500 mg by mouth two (2) times a day. Yes Provider, Historical   bisoprolol-hydroCHLOROthiazide (ZIAC) 5-6.25 mg per tablet TAKE 1 TABLET BY MOUTH EVERY DAY 2/22/22  Yes Dunia Talamantes MD   zolpidem (AMBIEN) 5 mg tablet TAKE 1/2 TO 1 TABLET BY MOUTH AT BEDTIME AS NEEDED 1/12/22  Yes Eris May III, MD   DULoxetine (CYMBALTA) 60 mg capsule Take 1 Capsule by mouth daily. 12/6/21  Yes Dunia Talamantes MD   traMADoL [facility]) 50 mg tablet Take 50 mg by mouth every six (6) hours as needed for Pain.    Yes Provider, Historical   Uro--10-40.8-36 mg cap capsule TAKE 1 CAP BY MOUTH FOUR (4) TIMES DAILY AS NEEDED FOR PAIN. 9/13/21  Yes Connor Mike Frank Schmidt III, MD   vitamin B complex capsule Take 1 Capsule by mouth daily. 6/24/21  Yes Cande Argueta MD   diclofenac (VOLTAREN) 1 % gel Apply 2 g to affected area four (4) times daily. 6/24/21  Yes Kwame Viveros III, MD   diphenhydrAMINE (BenadryL) 25 mg capsule Take 25 mg by mouth nightly as needed for Allergies. Yes Provider, Historical   acetaminophen (Tylenol Extra Strength) 500 mg tablet Take  by mouth every six (6) hours as needed for Pain. Yes Provider, Historical   cimetidine (Tagamet HB) 200 mg tab Take 200 mg by mouth as needed. Yes Provider, Historical   calcium carb/vit D3/minerals (CALCIUM-VITAMIN D PO) Take  by mouth. Yes Provider, Historical   B.infantis-B.ani-B.long-B.bifi (PROBIOTIC 4X) 10-15 mg TbEC Take  by mouth.    Yes Provider, Historical   Xarelto 20 mg tab tablet TAKE 1 TABLET BY MOUTH EVERY DAY  Patient not taking: Reported on 4/27/2022 12/27/21 4/27/22  Cande Argueta MD   OTHER JOINT CREAM  Patient not taking: Reported on 10/27/2021  4/27/22  Provider, Historical       Social History     Socioeconomic History    Marital status:      Spouse name: Not on file    Number of children: Not on file    Years of education: Not on file    Highest education level: Not on file   Occupational History    Not on file   Tobacco Use    Smoking status: Never Smoker    Smokeless tobacco: Never Used   Vaping Use    Vaping Use: Never used   Substance and Sexual Activity    Alcohol use: Not Currently     Alcohol/week: 1.7 - 3.3 standard drinks     Types: 2 - 4 Standard drinks or equivalent per week     Comment: OCCASIONALLY A GLASS OF WINE    Drug use: No    Sexual activity: Never   Other Topics Concern    Not on file   Social History Narrative    Not on file     Social Determinants of Health     Financial Resource Strain:     Difficulty of Paying Living Expenses: Not on file   Food Insecurity:     Worried About Running Out of Food in the Last Year: Not on file   Madeline Ran Out of Food in the Last Year: Not on file   Transportation Needs:     Lack of Transportation (Medical): Not on file    Lack of Transportation (Non-Medical): Not on file   Physical Activity:     Days of Exercise per Week: Not on file    Minutes of Exercise per Session: Not on file   Stress:     Feeling of Stress : Not on file   Social Connections:     Frequency of Communication with Friends and Family: Not on file    Frequency of Social Gatherings with Friends and Family: Not on file    Attends Alevism Services: Not on file    Active Member of [address] [place] [address] [employer] or Organizations: Not on file    Attends Club or Organization Meetings: Not on file    Marital Status: Not on file   Intimate Partner Violence:     Fear of Current or Ex-Partner: Not on file    Emotionally Abused: Not on file    Physically Abused: Not on file    Sexually Abused: Not on file   Housing Stability:     Unable to Pay for Housing in the Last Year: Not on file    Number of [place] in the Last Year: Not on file    Unstable Housing in the Last Year: Not on file          ROS  Per HPI    Visit Vitals  BP (!) 152/69   Pulse 68   Temp 98 °F (36.7 °C) (Temporal)   Resp 16   Ht 5' 6\" (1.676 m)   Wt 172 lb (78 kg)   LMP  (LMP Unknown) Comment: Pt states LMP early 30's   SpO2 97%   BMI 27.76 kg/m²         Physical Exam   Physical Examination: General appearance - alert, well appearing, and in no distress  Chest - clear to auscultation, no wheezes, rales or rhonchi, symmetric air entry  Heart - normal rate and regular rhythm  Abdomen - soft, nontender, nondistended, no masses or organomegaly  Left leg with chronic 2+ edema. There is swelling and tenderness about the left Achilles bursa. No redness. Pulses are 2+ and equal bilaterally. Assessment/Plan:  Diagnoses and all orders for this visit:    1. Neuropathy stable on Cymbalta. 2. Essential hypertensionblood pressure reasonably controlled. Continue current meds.   -     METABOLIC PANEL, COMPREHENSIVE; Future  -     CBC WITH AUTOMATED DIFF; Future  -     LIPID PANEL; Future    3. Hyponatremiarepeat sodium today. 4. Fasting hyperglycemiarecheck blood sugar today. -     METABOLIC PANEL, COMPREHENSIVE; Future  -     CBC WITH AUTOMATED DIFF; Future  -     LIPID PANEL; Future    5. Paroxysmal atrial fibrillation (HCC)no recurrence. 6.  Achilles bursitiswe will treat with stretching exercises and ice. 7.  Chronic edema related to previous clot and surgerywe will try Ace wraps to see if that is helpful as she does not think she can do compression hose. Follow-up and Dispositions    · Return in about 6 months (around 10/27/2022) for CPE. Advised her to call back or return to office if symptoms worsen/change/persist.  Discussed expected course/resolution/complications of diagnosis in detail with patient. Medication risks/benefits/costs/interactions/alternatives discussed with patient. She was given an after visit summary which includes diagnoses, current medications, & vitals. She expressed understanding with the diagnosis and plan.

## 2022-04-27 NOTE — PATIENT INSTRUCTIONS
Achilles Tendon: Exercises  Introduction  Here are some examples of exercises for you to try. The exercises may be suggested for a condition or for rehabilitation. Start each exercise slowly. Ease off the exercises if you start to have pain. You will be told when to start these exercises and which ones will work best for you. How to do the exercises  Toe stretch    1. Sit in a chair, and extend your affected leg so that your heel is on the floor. 2. With your hand, reach down and pull your big toe up and back. Pull toward your ankle and away from the floor. 3. Hold the position for at least 15 to 30 seconds. 4. Repeat 2 to 4 times a session, several times a day. Calf-plantar fascia stretch    1. Sit with your legs extended and knees straight. 2. Place a towel around your foot just under the toes. 3. Hold each end of the towel in each hand, with your hands above your knees. 4. Pull back with the towel so that your foot stretches toward you. 5. Hold the position for at least 15 to 30 seconds. 6. Repeat 2 to 4 times a session, up to 5 sessions a day. Floor stretch    1. Stand about 2 feet from a wall, and place your hands on the wall at about shoulder height. Or you can stand behind a chair, placing your hands on the back of it for balance. 2. Step back with the leg you want to stretch. Keep the leg straight, and press your heel into the floor with your toe turned slightly in.  3. Lean forward, and bend your other leg slightly. Feel the stretch in the Achilles tendon of your back leg. Hold for at least 15 to 30 seconds. 4. Repeat 2 to 4 times a session, up to 5 sessions a day. Stair stretch    1. Stand with the balls of both feet on the edge of a step or curb (or a medium-sized phone book). With at least one hand, hold onto something solid for balance, such as a banister or handrail.   2. Keeping your affected leg straight, slowly let that heel hang down off of the step or curb until you feel a stretch in the back of your calf and/or Achilles area. Some of your weight should still be on the other leg. 3. Hold this position for at least 15 to 30 seconds. 4. Repeat 2 to 4 times a session, up to 5 times a day or whenever your Achilles tendon starts to feel tight. This stretch can also be done with your knee slightly bent. Strength exercise    1. This exercise will get you started on building strength after an Achilles tendon injury. Your doctor or physical therapist can help you move on to more challenging exercises as you heal and get stronger. 2. Stand on a step with your heel off the edge of the step. Hold on to a handrail or wall for balance. 3. Push up on your toes, then slowly count to 10 as you lower yourself back down until your heel is below the step. If it hurts to push up on your toes, try putting most of your weight on your other foot as you push up, or try using your arms to help you. If you can't do this exercise without causing pain, stop the exercise and talk to your doctor. 4. Repeat the exercise 8 to 12 times, half with the knee straight and half with the knee bent. Follow-up care is a key part of your treatment and safety. Be sure to make and go to all appointments, and call your doctor if you are having problems. It's also a good idea to know your test results and keep a list of the medicines you take. Where can you learn more? Go to http://www.gray.com/  Enter S249 in the search box to learn more about \"Achilles Tendon: Exercises. \"  Current as of: July 1, 2021               Content Version: 13.2  © 2603-6904 [employer], [employer]. Care instructions adapted under license by [employer] (which disclaims liability or warranty for this information).  If you have questions about a medical condition or this instruction, always ask your healthcare professional. [address] any warranty or liability for your use of this information.

## 2022-05-25 DIAGNOSIS — I10 ESSENTIAL HYPERTENSION: ICD-10-CM

## 2022-05-25 RX ORDER — BISOPROLOL FUMARATE AND HYDROCHLOROTHIAZIDE 5; 6.25 MG/1; MG/1
TABLET ORAL
Qty: 90 TABLET | Refills: 0 | Status: SHIPPED | OUTPATIENT
Start: 2022-05-25 | End: 2022-08-28

## 2022-08-25 ENCOUNTER — OFFICE VISIT (OUTPATIENT)
Dept: INTERNAL MEDICINE CLINIC | Age: 82
End: 2022-08-25
Payer: MEDICARE

## 2022-08-25 ENCOUNTER — HOSPITAL ENCOUNTER (OUTPATIENT)
Dept: ULTRASOUND IMAGING | Age: 82
Discharge: HOME | End: 2022-08-25
Attending: INTERNAL MEDICINE
Payer: MEDICARE

## 2022-08-25 VITALS
WEIGHT: 172 LBS | OXYGEN SATURATION: 96 % | SYSTOLIC BLOOD PRESSURE: 135 MMHG | TEMPERATURE: 98 F | DIASTOLIC BLOOD PRESSURE: 75 MMHG | HEART RATE: 67 BPM | RESPIRATION RATE: 16 BRPM | BODY MASS INDEX: 27.64 KG/M2 | HEIGHT: 66 IN

## 2022-08-25 DIAGNOSIS — M79.604 RIGHT LEG PAIN: Primary | ICD-10-CM

## 2022-08-25 DIAGNOSIS — M79.604 RIGHT LEG PAIN: ICD-10-CM

## 2022-08-25 DIAGNOSIS — M54.6 ACUTE RIGHT-SIDED THORACIC BACK PAIN: ICD-10-CM

## 2022-08-25 PROCEDURE — G8510 SCR DEP NEG, NO PLAN REQD: HCPCS | Performed by: INTERNAL MEDICINE

## 2022-08-25 PROCEDURE — 1090F PRES/ABSN URINE INCON ASSESS: CPT | Performed by: INTERNAL MEDICINE

## 2022-08-25 PROCEDURE — G0463 HOSPITAL OUTPT CLINIC VISIT: HCPCS | Performed by: INTERNAL MEDICINE

## 2022-08-25 PROCEDURE — 93971 EXTREMITY STUDY: CPT

## 2022-08-25 PROCEDURE — G8752 SYS BP LESS 140: HCPCS | Performed by: INTERNAL MEDICINE

## 2022-08-25 PROCEDURE — G8754 DIAS BP LESS 90: HCPCS | Performed by: INTERNAL MEDICINE

## 2022-08-25 PROCEDURE — G8427 DOCREV CUR MEDS BY ELIG CLIN: HCPCS | Performed by: INTERNAL MEDICINE

## 2022-08-25 PROCEDURE — 1101F PT FALLS ASSESS-DOCD LE1/YR: CPT | Performed by: INTERNAL MEDICINE

## 2022-08-25 PROCEDURE — G8536 NO DOC ELDER MAL SCRN: HCPCS | Performed by: INTERNAL MEDICINE

## 2022-08-25 PROCEDURE — G8399 PT W/DXA RESULTS DOCUMENT: HCPCS | Performed by: INTERNAL MEDICINE

## 2022-08-25 PROCEDURE — G8417 CALC BMI ABV UP PARAM F/U: HCPCS | Performed by: INTERNAL MEDICINE

## 2022-08-25 PROCEDURE — 99213 OFFICE O/P EST LOW 20 MIN: CPT | Performed by: INTERNAL MEDICINE

## 2022-08-25 NOTE — PROGRESS NOTES
HPI:  Latonya Sharma is a 78y.o. year old female who is here for a 1 week history of pain in her right leg. It started behind her right knee and into her right calf. That has resolved but over the last 2 days she has developed some right-sided back discomfort. It is more sharp with movement in her back. Some mild cough. No pleuritic nature to the pain. No shortness of breath or wheeze. No fevers or chills. She is concerned about a possible blood clot as she has a family history of that and a personal history of clot on the left leg. Past Medical History:   Diagnosis Date    Arthritis     DDD BACK PAIN    Chest pain, atypical     Chronic pain     HIP    Diverticulitis of colon     Fibrocystic disease of breast     GERD (gastroesophageal reflux disease)     Hypercholesterolemia     Hypertension     IBS (irritable bowel syndrome)     Menopause 1968    Neuropathy     Ovarian cyst        Past Surgical History:   Procedure Laterality Date    ENDOSCOPY, COLON, DIAGNOSTIC  10/22/2010    5 yr fu, Brand    HX APPENDECTOMY      HX COLONOSCOPY  10/20/2016    Dr. Alphonso Webber - 5 yr f/u    HX DILATION AND CURETTAGE      HX HIP ARTHROSCOPY      HX TONSILLECTOMY         Prior to Admission medications    Medication Sig Start Date End Date Taking? Authorizing Provider   bisoprolol-hydroCHLOROthiazide [facility]) 5-6.25 mg per tablet TAKE 1 TABLET BY MOUTH EVERY DAY 5/25/22  Yes Pedro Herrera MD   nabumetone (RELAFEN) 500 mg tablet Take 500 mg by mouth two (2) times a day. Yes Provider, Historical   DULoxetine (CYMBALTA) 60 mg capsule Take 1 Capsule by mouth daily. 12/6/21  Yes Pedro Herrera MD   traMADoL [facility]) 50 mg tablet Take 50 mg by mouth every six (6) hours as needed for Pain. Yes Provider, Historical   Uro--10-40.8-36 mg cap capsule TAKE 1 CAP BY MOUTH FOUR (4) TIMES DAILY AS NEEDED FOR PAIN. 9/13/21  Yes Kim Murguia III, MD   vitamin B complex capsule Take 1 Capsule by mouth daily.  6/24/21 Yes Vahe Chairez MD   diclofenac (VOLTAREN) 1 % gel Apply 2 g to affected area four (4) times daily. 6/24/21  Yes Dwight Morrison III, MD   diphenhydrAMINE (BENADRYL) 25 mg capsule Take 25 mg by mouth nightly as needed for Allergies. Yes Provider, Historical   acetaminophen (TYLENOL) 500 mg tablet Take  by mouth every six (6) hours as needed for Pain. Yes Provider, Historical   cimetidine (TAGAMET) 200 mg tab Take 200 mg by mouth as needed. Yes Provider, Historical   calcium carb/vit D3/minerals (CALCIUM-VITAMIN D PO) Take  by mouth. Yes Provider, Historical   B.animalis,bifid,infantis,long 10-15 mg TbEC Take  by mouth.    Yes Provider, Historical   zolpidem (AMBIEN) 5 mg tablet TAKE 1/2 TO 1 TABLET BY MOUTH AT BEDTIME AS NEEDED  Patient not taking: Reported on 8/25/2022 1/12/22   Vahe Chairez MD       Social History     Socioeconomic History    Marital status:      Spouse name: Not on file    Number of children: Not on file    Years of education: Not on file    Highest education level: Not on file   Occupational History    Not on file   Tobacco Use    Smoking status: Never    Smokeless tobacco: Never   Vaping Use    Vaping Use: Never used   Substance and Sexual Activity    Alcohol use: Not Currently     Alcohol/week: 1.7 - 3.3 standard drinks     Types: 2 - 4 Standard drinks or equivalent per week     Comment: OCCASIONALLY A GLASS OF WINE    Drug use: No    Sexual activity: Never   Other Topics Concern    Not on file   Social History Narrative    Not on file     Social Determinants of Health     Financial Resource Strain: Not on file   Food Insecurity: Not on file   Transportation Needs: Not on file   Physical Activity: Not on file   Stress: Not on file   Social Connections: Not on file   Intimate Partner Violence: Not on file   Housing Stability: Not on file          ROS  Per HPI    Visit Vitals  /75   Pulse 67   Temp 98 °F (36.7 °C) (Temporal)   Resp 16   Ht 5' 6\" (1.676 m) Wt 172 lb (78 kg)   LMP  (LMP Unknown) Comment: Pt states LMP early 30's   SpO2 96%   BMI 27.76 kg/m²         Physical Exam   Physical Examination: General appearance - alert, well appearing, and in no distress  Chest - clear to auscultation, no wheezes, rales or rhonchi, symmetric air entry  Heart - normal rate, regular rhythm, normal S1, S2, no murmurs, rubs, clicks or gallops  Abdomen - soft, nontender, nondistended, no masses or organomegaly  Neurological - alert, oriented, normal speech, no focal findings or movement disorder noted  Musculoskeletal - abnormal exam of right knee with some mild tenderness in the popliteal fossa. Minimal calf tenderness. Negative Homans' sign. Some chronic venous stasis changes on the left leg. There is tenderness across the right paraspinous musculature in the back. Extremities - peripheral pulses normal, no pedal edema, no clubbing or cyanosis      Assessment/Plan:  Diagnoses and all orders for this visit:    1. Right leg pain-? Muscle related. At this point will obtain a Doppler to rule out clot. We will also obtain a D-dimer. If now normal, consider imaging for PE. Likely her back pain is muscle related. -     DUPLEX LOWER EXT VENOUS RIGHT; Future  -     D DIMER; Future    2. Acute right-sided thoracic back pain              Advised her to call back or return to office if symptoms worsen/change/persist.  Discussed expected course/resolution/complications of diagnosis in detail with patient. Medication risks/benefits/costs/interactions/alternatives discussed with patient. She was given an after visit summary which includes diagnoses, current medications, & vitals. She expressed understanding with the diagnosis and plan.

## 2022-08-28 DIAGNOSIS — I10 ESSENTIAL HYPERTENSION: ICD-10-CM

## 2022-08-28 RX ORDER — BISOPROLOL FUMARATE AND HYDROCHLOROTHIAZIDE 5; 6.25 MG/1; MG/1
TABLET ORAL
Qty: 90 TABLET | Refills: 0 | Status: SHIPPED | OUTPATIENT
Start: 2022-08-28

## 2022-08-31 ENCOUNTER — TELEPHONE (OUTPATIENT)
Dept: INTERNAL MEDICINE CLINIC | Age: 82
End: 2022-08-31

## 2022-08-31 NOTE — TELEPHONE ENCOUNTER
Patient states she received a call from [facility] stating her test could not be performed that were collected on 8/25/22. Pt states she had labs drawn at [facility].. Wants to know if Dr. Mansi Arias wants her to recollect?

## 2022-08-31 NOTE — TELEPHONE ENCOUNTER
Reviewed with PCP and states no need to recollect since doppler was normal.    Pt notified and voiced understanding.

## 2022-08-31 NOTE — TELEPHONE ENCOUNTER
----- Message from Sammy Payton sent at 8/30/2022  3:14 PM EDT -----  Subject: Message to Provider    QUESTIONS  Information for Provider? PT called in and requested to speak to the   office. I made several attempts and was not able to reach the office. Please call the PT back. [facility] and she needs to   get this straighten out.   ---------------------------------------------------------------------------  --------------  [id number] [facility]  [phone]; OK to leave message on voicemail  ---------------------------------------------------------------------------  --------------  SCRIPT ANSWERS  Relationship to Patient?  Self

## 2022-10-17 RX ORDER — DULOXETIN HYDROCHLORIDE 60 MG/1
60 CAPSULE, DELAYED RELEASE ORAL DAILY
Qty: 30 CAPSULE | Refills: 5 | Status: SHIPPED | OUTPATIENT
Start: 2022-10-17

## 2022-10-17 NOTE — TELEPHONE ENCOUNTER
Chief Complaint   Patient presents with    Medication Refill     Last Appointment with Dr. Jareth Mccord:  8/25/2022  Future Appointments   Date Time Provider Bushra Hunt   10/27/2022  1:30 PM Joey Chilel MD [facility]

## 2022-10-17 NOTE — TELEPHONE ENCOUNTER
Requested Prescriptions     Pending Prescriptions Disp Refills    DULoxetine (CYMBALTA) 60 mg capsule 30 Capsule 5     Sig: Take 1 Capsule by mouth daily.      [facility]/pharmacy #[id number]- [place], [address] [phone]

## 2022-11-14 DIAGNOSIS — I10 ESSENTIAL HYPERTENSION: ICD-10-CM

## 2022-11-14 RX ORDER — BISOPROLOL FUMARATE AND HYDROCHLOROTHIAZIDE 5; 6.25 MG/1; MG/1
TABLET ORAL
Qty: 90 TABLET | Refills: 0 | Status: SHIPPED | OUTPATIENT
Start: 2022-11-14 | End: 2022-12-01

## 2022-12-01 ENCOUNTER — TELEPHONE (OUTPATIENT)
Dept: INTERNAL MEDICINE CLINIC | Age: 82
End: 2022-12-01

## 2022-12-01 DIAGNOSIS — I10 ESSENTIAL HYPERTENSION: ICD-10-CM

## 2022-12-01 DIAGNOSIS — G62.9 NEUROPATHY: Primary | ICD-10-CM

## 2022-12-01 RX ORDER — BISOPROLOL FUMARATE AND HYDROCHLOROTHIAZIDE 5; 6.25 MG/1; MG/1
TABLET ORAL
Qty: 90 TABLET | Refills: 0 | Status: SHIPPED | OUTPATIENT
Start: 2022-12-01

## 2022-12-01 NOTE — TELEPHONE ENCOUNTER
Reason for call:    Patient said she and Dr. Roopa Mixon had discussed her seeing a neurologist for her lower left leg. She would like to go to [facility], [address] [place] [address], [address]. She would like her records sent to them. Their phone number is [phone].     Is this a new problem: yes     Date of last appointment:  8/25/2022     Can we respond via Mobile[employer]: no    Best call back number:     Nena Cage - [phone]

## 2022-12-01 NOTE — TELEPHONE ENCOUNTER
Patient needs PCP referral to [facility] for neuropathy. Pt states they will not schedule until MD reviews records - then they will call her for appointment.

## 2022-12-02 NOTE — TELEPHONE ENCOUNTER
Orders Placed This Encounter    REFERRAL TO NEUROLOGY     Referral Priority:   Routine     Referral Type:   Consultation     Referral Reason:   Specialty Services Required     Number of Visits Requested:   1     The above orders were approved via [facility] per Dr. Liam Rowan, III.

## 2022-12-02 NOTE — TELEPHONE ENCOUNTER
Referral and last office note faxed to [facility] @ fax # [phone]. Confirmation received.

## 2022-12-05 RX ORDER — DULOXETIN HYDROCHLORIDE 60 MG/1
CAPSULE, DELAYED RELEASE ORAL
Qty: 30 CAPSULE | Refills: 5 | Status: SHIPPED | OUTPATIENT
Start: 2022-12-05

## 2022-12-29 RX ORDER — METHENAMINE, SODIUM PHOSPHATE, MONOBASIC, ANHYDROUS, PHENYL SALICYLATE, METHYLENE BLUE AND HYOSCYAMINE SULFATE 118; 40.8; 36; 10; .12 MG/1; MG/1; MG/1; MG/1; MG/1
CAPSULE ORAL
Qty: 60 CAPSULE | Refills: 3 | Status: SHIPPED | OUTPATIENT
Start: 2022-12-29

## 2023-02-10 ENCOUNTER — TELEPHONE (OUTPATIENT)
Dept: INTERNAL MEDICINE CLINIC | Age: 83
End: 2023-02-10

## 2023-02-10 NOTE — TELEPHONE ENCOUNTER
Pt was referred to [facility] [address] [place] [address]. phone [phone] fax [phone]  Call to the office to check on status of referral (records faxed in December). They did not receive ref or records we will re-send.

## 2023-02-10 NOTE — TELEPHONE ENCOUNTER
Refaxed referral and records to [facility] at [phone] with attention to scheduling.

## 2023-02-10 NOTE — TELEPHONE ENCOUNTER
----- Message from Hafsa Goldberg sent at 2/10/2023 10:14 AM EST -----  Subject: Message to Provider    QUESTIONS  Information for Provider? pt wanted [facility] to know she has never heard   back from the neurology group that you had talked about and would like   [facility] to give her a call back. ---------------------------------------------------------------------------  --------------  Araceli Shah [facility]  [phone]; OK to leave message on voicemail  ---------------------------------------------------------------------------  --------------  SCRIPT ANSWERS  Relationship to Patient?  Self

## 2023-03-21 ENCOUNTER — OFFICE VISIT (OUTPATIENT)
Dept: INTERNAL MEDICINE CLINIC | Age: 83
End: 2023-03-21
Payer: MEDICARE

## 2023-03-21 VITALS
HEIGHT: 66 IN | WEIGHT: 177 LBS | DIASTOLIC BLOOD PRESSURE: 70 MMHG | OXYGEN SATURATION: 96 % | HEART RATE: 74 BPM | SYSTOLIC BLOOD PRESSURE: 152 MMHG | BODY MASS INDEX: 28.45 KG/M2 | TEMPERATURE: 98 F | RESPIRATION RATE: 12 BRPM

## 2023-03-21 DIAGNOSIS — I10 ESSENTIAL HYPERTENSION: ICD-10-CM

## 2023-03-21 DIAGNOSIS — I48.0 PAROXYSMAL ATRIAL FIBRILLATION (HCC): ICD-10-CM

## 2023-03-21 DIAGNOSIS — E78.2 MIXED HYPERLIPIDEMIA: ICD-10-CM

## 2023-03-21 DIAGNOSIS — K57.32 DIVERTICULITIS OF COLON: ICD-10-CM

## 2023-03-21 DIAGNOSIS — E55.9 VITAMIN D DEFICIENCY: ICD-10-CM

## 2023-03-21 DIAGNOSIS — Z00.00 MEDICARE ANNUAL WELLNESS VISIT, SUBSEQUENT: Primary | ICD-10-CM

## 2023-03-21 DIAGNOSIS — M16.12 PRIMARY LOCALIZED OSTEOARTHRITIS OF LEFT HIP: ICD-10-CM

## 2023-03-21 PROCEDURE — 1101F PT FALLS ASSESS-DOCD LE1/YR: CPT | Performed by: INTERNAL MEDICINE

## 2023-03-21 PROCEDURE — G0439 PPPS, SUBSEQ VISIT: HCPCS | Performed by: INTERNAL MEDICINE

## 2023-03-21 PROCEDURE — G8427 DOCREV CUR MEDS BY ELIG CLIN: HCPCS | Performed by: INTERNAL MEDICINE

## 2023-03-21 PROCEDURE — G8536 NO DOC ELDER MAL SCRN: HCPCS | Performed by: INTERNAL MEDICINE

## 2023-03-21 PROCEDURE — G0463 HOSPITAL OUTPT CLINIC VISIT: HCPCS | Performed by: INTERNAL MEDICINE

## 2023-03-21 PROCEDURE — G8417 CALC BMI ABV UP PARAM F/U: HCPCS | Performed by: INTERNAL MEDICINE

## 2023-03-21 PROCEDURE — 1090F PRES/ABSN URINE INCON ASSESS: CPT | Performed by: INTERNAL MEDICINE

## 2023-03-21 PROCEDURE — 99214 OFFICE O/P EST MOD 30 MIN: CPT | Performed by: INTERNAL MEDICINE

## 2023-03-21 PROCEDURE — G8510 SCR DEP NEG, NO PLAN REQD: HCPCS | Performed by: INTERNAL MEDICINE

## 2023-03-21 PROCEDURE — G8399 PT W/DXA RESULTS DOCUMENT: HCPCS | Performed by: INTERNAL MEDICINE

## 2023-03-21 RX ORDER — OLOPATADINE HYDROCHLORIDE 2 MG/ML
1 SOLUTION/ DROPS OPHTHALMIC DAILY
COMMUNITY

## 2023-03-21 RX ORDER — BISOPROLOL FUMARATE AND HYDROCHLOROTHIAZIDE 10; 6.25 MG/1; MG/1
1 TABLET ORAL DAILY
Qty: 90 TABLET | Refills: 2 | Status: SHIPPED | OUTPATIENT
Start: 2023-03-21

## 2023-03-21 NOTE — PATIENT INSTRUCTIONS
Medicare Wellness Visit, Female     The best way to live healthy is to have a lifestyle where you eat a well-balanced diet, exercise regularly, limit alcohol use, and quit all forms of tobacco/nicotine, if applicable. Regular preventive services are another way to keep healthy. Preventive services (vaccines, screening tests, monitoring & exams) can help personalize your care plan, which helps you manage your own care. Screening tests can find health problems at the earliest stages, when they are easiest to treat. [facility] follows the current, evidence-based guidelines published by the [facility] Wander Crocker (RUSTST[employer]) when recommending preventive services for our patients. Because we follow these guidelines, sometimes recommendations change over time as research supports it. (For example, mammograms used to be recommended annually. Even though Medicare will still pay for an annual mammogram, the newer guidelines recommend a mammogram every two years for women of average risk). Of course, you and your doctor may decide to screen more often for some diseases, based on your risk and your co-morbidities (chronic disease you are already diagnosed with). Preventive services for you include:  - Medicare offers their members a free annual wellness visit, which is time for you and your primary care provider to discuss and plan for your preventive service needs.  Take advantage of this benefit every year!    -Over the age of 72 should receive the recommended pneumonia vaccines.    -All adults should have a flu vaccine yearly.  -All adults should have a tetanus vaccine every 10 years.   -Over the age 48 should receive the shingles vaccines.        -All adults should be screened once for Hepatitis C.  -All adults age 38-68 who are overweight should have a diabetes screening test once every three years.   -Other screening tests and preventive services for persons with diabetes include: an eye exam to screen for diabetic retinopathy, a kidney function test, a foot exam, and stricter control over your cholesterol.   -Cardiovascular screening for adults with routine risk involves an electrocardiogram (ECG) at intervals determined by your doctor.     -Colorectal cancer screenings should be done for adults age 39-70 with no increased risk factors for colorectal cancer. There are a number of acceptable methods of screening for this type of cancer. Each test has its own benefits and drawbacks. Discuss with your doctor what is most appropriate for you during your annual wellness visit. The different tests include: colonoscopy (considered the best screening method), a fecal occult blood test, a fecal DNA test, and sigmoidoscopy.    -Lung cancer screening is recommended annually with a low dose CT scan for adults between age 54 and 68, who have smoked at least 30 pack years (equivalent of 1 pack per day for 30 days), and who is a current smoker or quit less than 15 years ago.    -A bone mass density test is recommended when a woman turns 65 to screen for osteoporosis. This test is only recommended one time, as a screening. Some providers will use this same test as a disease monitoring tool if you already have osteoporosis. -Breast cancer screenings are recommended every other year for women of normal risk, age 54-69.    -Cervical cancer screenings for women over age 72 are only recommended with certain risk factors.      Here is a list of your current Health Maintenance items (your personalized list of preventive services) with a due date:  Health Maintenance Due   Topic Date Due    COVID-19 Vaccine (4 - Booster for Derl Fitch series) 01/29/2022

## 2023-03-21 NOTE — PROGRESS NOTES
This is the Subsequent Medicare Annual Wellness Exam, performed 12 months or more after the Initial AWV or the last Subsequent AWV    I have reviewed the patient's medical history in detail and updated the computerized patient record. Also for follow-up of her health issues. She is struggling with the stress of her sisters illness and being her caregiver. She has had some mild dull headaches. She has ongoing issues with numbness in her left foot and some skin changes. She has been trying to get an appointment to see neurology unsuccessfully. The rest of her health issues tend to be stable. ROS - Per HPI    Physical Examination: General appearance - alert, well appearing, and in no distress  Mental status - alert, oriented to person, place, and time  Ears - bilateral TM's and external ear canals normal  Mouth - mucous membranes moist, pharynx normal without lesions  Neck - supple, no significant adenopathy  Lymphatics - no palpable lymphadenopathy, no hepatosplenomegaly  Chest - clear to auscultation, no wheezes, rales or rhonchi, symmetric air entry  Heart - normal rate, regular rhythm, normal S1, S2, no murmurs, rubs, clicks or gallops  Abdomen - soft, nontender, nondistended, no masses or organomegaly  Neurological - alert, oriented, normal speech, no focal findings or movement disorder noted  Musculoskeletal - no joint tenderness, deformity or swelling  Extremities - peripheral pulses normal, no pedal edema, no clubbing or cyanosis    Assessment/Plan   Education and counseling provided:  Are appropriate based on today's review and evaluation  End-of-Life planning (with patient's consent)  Diabetes screening test    1. Medicare annual wellness visit, subsequent  2. Mixed hyperlipidemia - LDL goal of 100. Tolerating statins well. -     LIPID PANEL; Future  -     TSH 3RD GENERATION; Future  3. Paroxysmal atrial fibrillation (HCC) - no recurrence  4. Essential hypertension - not well controlled.  Will increase Ziac to 10. Check BP and report readings in 2 weeks. -     METABOLIC PANEL, COMPREHENSIVE; Future  -     CBC WITH AUTOMATED DIFF; Future  -     TSH 3RD GENERATION; Future  5. Diverticulitis of colon - stable  6. Vitamin D deficiency - repleted  7. Primary localized osteoarthritis of left hip - post surgery. Depression Risk Factor Screening     3 most recent PHQ Screens 3/21/2023   Little interest or pleasure in doing things Not at all   Feeling down, depressed, irritable, or hopeless Not at all   Total Score PHQ 2 0       Alcohol & Drug Abuse Risk Screen    Do you average more than 1 drink per night or more than 7 drinks a week:  No    On any one occasion in the past three months have you have had more than 3 drinks containing alcohol:  No          Functional Ability and Level of Safety    Hearing: Hearing is good. Activities of Daily Living: The home contains: no safety equipment. Patient does total self care      Ambulation: with no difficulty     Fall Risk:  Fall Risk Assessment, last 12 mths 3/21/2023   Able to walk? Yes   Fall in past 12 months? 1   Do you feel unsteady? 1   Are you worried about falling 1   Is TUG test greater than 12 seconds? 1   Is the gait abnormal? 1   Number of falls in past 12 months 1   Fall with injury?  0      Abuse Screen:  Patient is not abused       Cognitive Screening    Has your family/caregiver stated any concerns about your memory: no         Health Maintenance Due     Health Maintenance Due   Topic Date Due    COVID-19 Vaccine (4 - Booster for Collie Piper series) 01/29/2022       Patient Care Team   Patient Care Team:  Rachel Reyes MD as PCP - General  Rachel Reyes MD as PCP - REHABILITATION [facility] Empaneled Provider  Mathieu Gonzalez MD (Gastroenterology)  Chilo Pinzon MD (Ophthalmology)  Shay Cedeño MD (Otolaryngology)  Walt Farris MD (Urology)  Lluvia Napoles MD (Orthopedic Surgery)    History     Patient Active Problem List Diagnosis Code    Diverticulitis of colon K57.32    Back pain M54.9    Mixed hyperlipidemia E78.2    Essential hypertension I10    Vitamin D deficiency E55.9    Advance directive discussed with patient Z71.89    Cephalalgia R51.9    Primary localized osteoarthritis of left hip M16.12    Paroxysmal atrial fibrillation (HCC) I48.0     Past Medical History:   Diagnosis Date    Arthritis     DDD BACK PAIN    Chest pain, atypical     Chronic pain     HIP    Diverticulitis of colon     Fibrocystic disease of breast     GERD (gastroesophageal reflux disease)     Hypercholesterolemia     Hypertension     IBS (irritable bowel syndrome)     Menopause 1968    Neuropathy     Ovarian cyst       Past Surgical History:   Procedure Laterality Date    ENDOSCOPY, COLON, DIAGNOSTIC  10/22/2010    5 yr fu, Brand    HX APPENDECTOMY      HX COLONOSCOPY  10/20/2016    Dr. Myrtha Denver - 5 yr f/u    HX DILATION AND CURETTAGE      HX HIP ARTHROSCOPY      HX TONSILLECTOMY       Current Outpatient Medications   Medication Sig Dispense Refill    TURMERIC PO Take  by mouth.      multivit-mins no.63/iron/folic (M-VIT PO) Take  by mouth. olopatadine (Pataday) 0.2 % drop ophthalmic solution Administer 1 Drop to both eyes daily. DULoxetine (CYMBALTA) 60 mg capsule TAKE 1 CAPSULE BY MOUTH EVERY DAY 30 Capsule 5    bisoprolol-hydroCHLOROthiazide (ZIAC) 5-6.25 mg per tablet TAKE 1 TABLET BY MOUTH EVERY DAY 90 Tablet 0    nabumetone (RELAFEN) 500 mg tablet Take 500 mg by mouth two (2) times a day. vitamin B complex capsule Take 1 Capsule by mouth daily. diclofenac (VOLTAREN) 1 % gel Apply 2 g to affected area four (4) times daily. diphenhydrAMINE (BENADRYL) 25 mg capsule Take 25 mg by mouth nightly as needed for Allergies. acetaminophen (TYLENOL) 500 mg tablet Take  by mouth every six (6) hours as needed for Pain. cimetidine (TAGAMET) 200 mg tab Take 200 mg by mouth as needed.       calcium carb/vit D3/minerals (CALCIUM-VITAMIN D PO) Take  by mouth. B.animalis,bifid,infantis,long 10-15 mg TbEC Take  by mouth.        Allergies   Allergen Reactions    Pcn [Penicillins] Swelling     Arm with injection    Sonata [Zaleplon] Itching       Family History   Problem Relation Age of Onset    Arthritis-rheumatoid Mother     Heart Disease Father     Hypertension Sister     OSTEOARTHRITIS Sister     ALS Sister     Other Sister         IBS    Cancer Maternal Grandfather     Cancer Maternal Uncle     Anesth Problems Neg Hx      Social History     Tobacco Use    Smoking status: Never    Smokeless tobacco: Never   Substance Use Topics    Alcohol use: Not Currently         Edilia Cox MD

## 2023-03-24 DIAGNOSIS — I10 ESSENTIAL HYPERTENSION: ICD-10-CM

## 2023-03-24 DIAGNOSIS — E78.2 MIXED HYPERLIPIDEMIA: ICD-10-CM

## 2023-03-24 LAB
ALBUMIN SERPL-MCNC: 4.2 G/DL (ref 3.5–5)
ALBUMIN/GLOB SERPL: 1.8 (ref 1.1–2.2)
ALP SERPL-CCNC: 66 U/L (ref 45–117)
ALT SERPL-CCNC: 21 U/L (ref 12–78)
ANION GAP SERPL CALC-SCNC: 3 MMOL/L (ref 5–15)
AST SERPL-CCNC: 17 U/L (ref 15–37)
BASOPHILS # BLD: 0 K/UL (ref 0–0.1)
BASOPHILS NFR BLD: 1 % (ref 0–1)
BILIRUB SERPL-MCNC: 0.5 MG/DL (ref 0.2–1)
BUN SERPL-MCNC: 12 MG/DL (ref 6–20)
BUN/CREAT SERPL: 19 (ref 12–20)
CALCIUM SERPL-MCNC: 10.3 MG/DL (ref 8.5–10.1)
CHLORIDE SERPL-SCNC: 102 MMOL/L (ref 97–108)
CHOLEST SERPL-MCNC: 197 MG/DL
CO2 SERPL-SCNC: 30 MMOL/L (ref 21–32)
CREAT SERPL-MCNC: 0.63 MG/DL (ref 0.55–1.02)
DIFFERENTIAL METHOD BLD: NORMAL
EOSINOPHIL # BLD: 0.1 K/UL (ref 0–0.4)
EOSINOPHIL NFR BLD: 1 % (ref 0–7)
ERYTHROCYTE [DISTWIDTH] IN BLOOD BY AUTOMATED COUNT: 12.7 % (ref 11.5–14.5)
GLOBULIN SER CALC-MCNC: 2.4 G/DL (ref 2–4)
GLUCOSE SERPL-MCNC: 101 MG/DL (ref 65–100)
HCT VFR BLD AUTO: 40.5 % (ref 35–47)
HDLC SERPL-MCNC: 59 MG/DL
HDLC SERPL: 3.3 (ref 0–5)
HGB BLD-MCNC: 12.9 G/DL (ref 11.5–16)
IMM GRANULOCYTES # BLD AUTO: 0 K/UL (ref 0–0.04)
IMM GRANULOCYTES NFR BLD AUTO: 0 % (ref 0–0.5)
LDLC SERPL CALC-MCNC: 114.6 MG/DL (ref 0–100)
LYMPHOCYTES # BLD: 1.6 K/UL (ref 0.8–3.5)
LYMPHOCYTES NFR BLD: 29 % (ref 12–49)
MCH RBC QN AUTO: 28.5 PG (ref 26–34)
MCHC RBC AUTO-ENTMCNC: 31.9 G/DL (ref 30–36.5)
MCV RBC AUTO: 89.4 FL (ref 80–99)
MONOCYTES # BLD: 0.4 K/UL (ref 0–1)
MONOCYTES NFR BLD: 8 % (ref 5–13)
NEUTS SEG # BLD: 3.4 K/UL (ref 1.8–8)
NEUTS SEG NFR BLD: 61 % (ref 32–75)
NRBC # BLD: 0 K/UL (ref 0–0.01)
NRBC BLD-RTO: 0 PER 100 WBC
PLATELET # BLD AUTO: 222 K/UL (ref 150–400)
PMV BLD AUTO: 11.6 FL (ref 8.9–12.9)
POTASSIUM SERPL-SCNC: 4.2 MMOL/L (ref 3.5–5.1)
PROT SERPL-MCNC: 6.6 G/DL (ref 6.4–8.2)
RBC # BLD AUTO: 4.53 M/UL (ref 3.8–5.2)
SODIUM SERPL-SCNC: 135 MMOL/L (ref 136–145)
TRIGL SERPL-MCNC: 117 MG/DL (ref ?–150)
TSH SERPL DL<=0.05 MIU/L-ACNC: 1.87 UIU/ML (ref 0.36–3.74)
VLDLC SERPL CALC-MCNC: 23.4 MG/DL
WBC # BLD AUTO: 5.6 K/UL (ref 3.6–11)

## 2023-04-04 ENCOUNTER — HOSPITAL ENCOUNTER (OUTPATIENT)
Dept: MAMMOGRAPHY | Age: 83
End: 2023-04-04
Attending: INTERNAL MEDICINE
Payer: MEDICARE

## 2023-04-04 PROCEDURE — 77067 SCR MAMMO BI INCL CAD: CPT

## 2023-04-23 DIAGNOSIS — Z12.31 VISIT FOR SCREENING MAMMOGRAM: Primary | ICD-10-CM

## 2023-04-26 RX ORDER — DULOXETIN HYDROCHLORIDE 60 MG/1
60 CAPSULE, DELAYED RELEASE ORAL DAILY
Qty: 30 CAPSULE | Refills: 5 | Status: SHIPPED | OUTPATIENT
Start: 2023-04-26

## 2023-04-26 NOTE — TELEPHONE ENCOUNTER
Requested Prescriptions     Pending Prescriptions Disp Refills    DULoxetine (CYMBALTA) 60 mg capsule 30 Capsule 5     Sig: Take 1 Capsule by mouth daily.      [facility]/pharmacy #[id number]- [place], [address] [facility] [address]  [phone]

## 2023-04-26 NOTE — TELEPHONE ENCOUNTER
Chief Complaint   Patient presents with    Medication Refill     Last Appointment with Dr. Michel Bai:  3/21/2023  Future Appointments   Date Time Provider Bushra Hunt   3/25/2024  1:15 PM MD REYMUNDO Fuller AMB

## 2023-09-06 ENCOUNTER — OFFICE VISIT (OUTPATIENT)
Age: 83
End: 2023-09-06
Payer: MEDICARE

## 2023-09-06 VITALS
RESPIRATION RATE: 15 BRPM | DIASTOLIC BLOOD PRESSURE: 77 MMHG | SYSTOLIC BLOOD PRESSURE: 159 MMHG | OXYGEN SATURATION: 97 % | HEIGHT: 66 IN | HEART RATE: 74 BPM | TEMPERATURE: 98.4 F | BODY MASS INDEX: 28.87 KG/M2 | WEIGHT: 179.6 LBS

## 2023-09-06 DIAGNOSIS — E83.52 HYPERCALCEMIA: ICD-10-CM

## 2023-09-06 DIAGNOSIS — Z91.81 AT HIGH RISK FOR FALLS: ICD-10-CM

## 2023-09-06 DIAGNOSIS — I82.462 ACUTE EMBOLISM AND THROMBOSIS OF LEFT CALF MUSCULAR VEIN (HCC): ICD-10-CM

## 2023-09-06 DIAGNOSIS — R26.89 BALANCE PROBLEM: ICD-10-CM

## 2023-09-06 DIAGNOSIS — R51.9 ACUTE INTRACTABLE HEADACHE, UNSPECIFIED HEADACHE TYPE: ICD-10-CM

## 2023-09-06 DIAGNOSIS — R25.1 TREMOR: Primary | ICD-10-CM

## 2023-09-06 PROCEDURE — 3078F DIAST BP <80 MM HG: CPT | Performed by: INTERNAL MEDICINE

## 2023-09-06 PROCEDURE — G8399 PT W/DXA RESULTS DOCUMENT: HCPCS | Performed by: INTERNAL MEDICINE

## 2023-09-06 PROCEDURE — 3077F SYST BP >= 140 MM HG: CPT | Performed by: INTERNAL MEDICINE

## 2023-09-06 PROCEDURE — 1123F ACP DISCUSS/DSCN MKR DOCD: CPT | Performed by: INTERNAL MEDICINE

## 2023-09-06 PROCEDURE — G8419 CALC BMI OUT NRM PARAM NOF/U: HCPCS | Performed by: INTERNAL MEDICINE

## 2023-09-06 PROCEDURE — 99214 OFFICE O/P EST MOD 30 MIN: CPT | Performed by: INTERNAL MEDICINE

## 2023-09-06 PROCEDURE — G8427 DOCREV CUR MEDS BY ELIG CLIN: HCPCS | Performed by: INTERNAL MEDICINE

## 2023-09-06 PROCEDURE — 1090F PRES/ABSN URINE INCON ASSESS: CPT | Performed by: INTERNAL MEDICINE

## 2023-09-06 PROCEDURE — 1036F TOBACCO NON-USER: CPT | Performed by: INTERNAL MEDICINE

## 2023-09-06 SDOH — ECONOMIC STABILITY: FOOD INSECURITY: WITHIN THE PAST 12 MONTHS, YOU WORRIED THAT YOUR FOOD WOULD RUN OUT BEFORE YOU GOT MONEY TO BUY MORE.: NEVER TRUE

## 2023-09-06 SDOH — ECONOMIC STABILITY: FOOD INSECURITY: WITHIN THE PAST 12 MONTHS, THE FOOD YOU BOUGHT JUST DIDN'T LAST AND YOU DIDN'T HAVE MONEY TO GET MORE.: NEVER TRUE

## 2023-09-06 SDOH — ECONOMIC STABILITY: INCOME INSECURITY: HOW HARD IS IT FOR YOU TO PAY FOR THE VERY BASICS LIKE FOOD, HOUSING, MEDICAL CARE, AND HEATING?: NOT HARD AT ALL

## 2023-09-06 SDOH — ECONOMIC STABILITY: HOUSING INSECURITY
IN THE LAST 12 MONTHS, WAS THERE A TIME WHEN YOU DID NOT HAVE A STEADY PLACE TO SLEEP OR SLEPT IN A SHELTER (INCLUDING NOW)?: NO

## 2023-09-06 NOTE — PROGRESS NOTES
HPI:  Christy Ramirez is a 78y.o. year old female who is here for a 2-month history of not feeling well. She has had a noise that she has heard inside of her head over that period of time and it has become more prominent. Now she feels unsteady and unbalanced at times. She feels that particularly when she closes her eyes. She has noticed an increased tremor in her hands at rest.  She has had a previous intention tremor that is still present as well. No falls. Some headache. No true vertigo. No chest pains. Some feeling of skipping in her heart at times as well. No dyspnea on exertion. No PND or orthopnea. No nausea or vomiting. Her blood pressures have been slightly elevated recently but she was previously checking them and they were normal.      Past Medical History:   Diagnosis Date    Arthritis     DDD BACK PAIN    Chest pain, atypical     Chronic pain     HIP    Diverticulitis of colon     Fibrocystic disease of breast     GERD (gastroesophageal reflux disease)     Hypercholesterolemia     Hypertension     IBS (irritable bowel syndrome)     Menopause 1968    Neuropathy     Ovarian cyst        Past Surgical History:   Procedure Laterality Date    APPENDECTOMY      COLONOSCOPY  10/20/2016    Dr. Maribel Gonzalez - 5 yr f/u    COLONOSCOPY  10/22/2010    5 yr fu, [facility]    DILATION AND CURETTAGE OF UTERUS      HIP ARTHROSCOPY      TONSILLECTOMY         Prior to Admission medications    Medication Sig Start Date End Date Taking?  Authorizing Provider   acetaminophen (TYLENOL) 500 MG tablet Take by mouth every 6 hours as needed   Yes Ar Automatic Reconciliation   bisoprolol-hydroCHLOROthiazide (ZIAC) 5-6.25 MG per tablet TAKE 1 TABLET BY MOUTH EVERY DAY 12/1/22  Yes Ar Automatic Reconciliation   cimetidine (TAGAMET) 200 MG tablet Take by mouth as needed   Yes Ar Automatic Reconciliation   diclofenac sodium (VOLTAREN) 1 % GEL Apply topically 4 times daily 6/24/21  Yes Ar Automatic Reconciliation   diphenhydrAMINE

## 2023-09-07 DIAGNOSIS — R51.9 ACUTE INTRACTABLE HEADACHE, UNSPECIFIED HEADACHE TYPE: ICD-10-CM

## 2023-09-07 DIAGNOSIS — R26.89 BALANCE PROBLEM: ICD-10-CM

## 2023-09-07 DIAGNOSIS — R25.1 TREMOR: Primary | ICD-10-CM

## 2023-09-07 LAB
ALBUMIN SERPL-MCNC: 4.4 G/DL (ref 3.5–5)
ALBUMIN/GLOB SERPL: 1.5 (ref 1.1–2.2)
ALP SERPL-CCNC: 69 U/L (ref 45–117)
ALT SERPL-CCNC: 24 U/L (ref 12–78)
ANION GAP SERPL CALC-SCNC: 5 MMOL/L (ref 5–15)
AST SERPL-CCNC: 12 U/L (ref 15–37)
BASOPHILS # BLD: 0.1 K/UL (ref 0–0.1)
BASOPHILS NFR BLD: 1 % (ref 0–1)
BILIRUB SERPL-MCNC: 0.3 MG/DL (ref 0.2–1)
BUN SERPL-MCNC: 17 MG/DL (ref 6–20)
BUN/CREAT SERPL: 26 (ref 12–20)
CALCIUM SERPL-MCNC: 10.6 MG/DL (ref 8.5–10.1)
CALCIUM SERPL-MCNC: 10.6 MG/DL (ref 8.5–10.1)
CHLORIDE SERPL-SCNC: 101 MMOL/L (ref 97–108)
CO2 SERPL-SCNC: 31 MMOL/L (ref 21–32)
CREAT SERPL-MCNC: 0.65 MG/DL (ref 0.55–1.02)
DIFFERENTIAL METHOD BLD: NORMAL
EOSINOPHIL # BLD: 0.1 K/UL (ref 0–0.4)
EOSINOPHIL NFR BLD: 1 % (ref 0–7)
ERYTHROCYTE [DISTWIDTH] IN BLOOD BY AUTOMATED COUNT: 12.9 % (ref 11.5–14.5)
ERYTHROCYTE [SEDIMENTATION RATE] IN BLOOD: 4 MM/HR (ref 0–30)
GLOBULIN SER CALC-MCNC: 2.9 G/DL (ref 2–4)
GLUCOSE SERPL-MCNC: 99 MG/DL (ref 65–100)
HCT VFR BLD AUTO: 41 % (ref 35–47)
HGB BLD-MCNC: 12.9 G/DL (ref 11.5–16)
IMM GRANULOCYTES # BLD AUTO: 0 K/UL (ref 0–0.04)
IMM GRANULOCYTES NFR BLD AUTO: 0 % (ref 0–0.5)
LYMPHOCYTES # BLD: 2 K/UL (ref 0.8–3.5)
LYMPHOCYTES NFR BLD: 26 % (ref 12–49)
MCH RBC QN AUTO: 28.9 PG (ref 26–34)
MCHC RBC AUTO-ENTMCNC: 31.5 G/DL (ref 30–36.5)
MCV RBC AUTO: 91.9 FL (ref 80–99)
MONOCYTES # BLD: 0.7 K/UL (ref 0–1)
MONOCYTES NFR BLD: 9 % (ref 5–13)
NEUTS SEG # BLD: 4.8 K/UL (ref 1.8–8)
NEUTS SEG NFR BLD: 63 % (ref 32–75)
NRBC # BLD: 0 K/UL (ref 0–0.01)
NRBC BLD-RTO: 0 PER 100 WBC
PLATELET # BLD AUTO: 227 K/UL (ref 150–400)
PMV BLD AUTO: 11.4 FL (ref 8.9–12.9)
POTASSIUM SERPL-SCNC: 4.5 MMOL/L (ref 3.5–5.1)
PROT SERPL-MCNC: 7.3 G/DL (ref 6.4–8.2)
PTH-INTACT SERPL-MCNC: 51.6 PG/ML (ref 18.4–88)
RBC # BLD AUTO: 4.46 M/UL (ref 3.8–5.2)
SODIUM SERPL-SCNC: 137 MMOL/L (ref 136–145)
TSH SERPL DL<=0.05 MIU/L-ACNC: 1.98 UIU/ML (ref 0.36–3.74)
WBC # BLD AUTO: 7.6 K/UL (ref 3.6–11)

## 2023-09-17 ENCOUNTER — HOSPITAL ENCOUNTER (OUTPATIENT)
Facility: HOSPITAL | Age: 83
Discharge: HOME | End: 2023-09-20
Attending: INTERNAL MEDICINE
Payer: MEDICARE

## 2023-09-17 DIAGNOSIS — R25.1 TREMOR: ICD-10-CM

## 2023-09-17 DIAGNOSIS — R51.9 ACUTE INTRACTABLE HEADACHE, UNSPECIFIED HEADACHE TYPE: ICD-10-CM

## 2023-09-17 DIAGNOSIS — R26.89 BALANCE PROBLEM: ICD-10-CM

## 2023-09-17 PROCEDURE — 70553 MRI BRAIN STEM W/O & W/DYE: CPT

## 2023-09-17 PROCEDURE — A9579 GAD-BASE MR CONTRAST NOS,1ML: HCPCS

## 2023-09-17 PROCEDURE — 6360000004 HC RX CONTRAST MEDICATION

## 2023-09-17 RX ADMIN — GADOTERIDOL 15 ML: 279.3 INJECTION, SOLUTION INTRAVENOUS at 15:09

## 2023-09-19 ENCOUNTER — TELEPHONE (OUTPATIENT)
Age: 83
End: 2023-09-19

## 2023-09-19 NOTE — TELEPHONE ENCOUNTER
Per SRJ:  Micaela Michaels MD  [facility] Team One  Notify MRI Brain is OK - neurology follow up

## 2023-09-19 NOTE — TELEPHONE ENCOUNTER
Reason for call:  TC from pt. Pt returning nurse call.      Is this a new problem: No    Date of last appointment:  9/6/2023     Can we respond via [employer]: No    Best call back number: [phone]

## 2023-09-19 NOTE — TELEPHONE ENCOUNTER
Pt returned call, read SRJ result note, pt voiced understanding and states she will follow up with her Neurologist if s/sx persist.

## 2023-11-01 RX ORDER — BISOPROLOL FUMARATE AND HYDROCHLOROTHIAZIDE 10; 6.25 MG/1; MG/1
1 TABLET ORAL DAILY
Qty: 90 TABLET | Refills: 2 | Status: SHIPPED | OUTPATIENT
Start: 2023-11-01

## 2023-11-01 RX ORDER — DULOXETIN HYDROCHLORIDE 60 MG/1
CAPSULE, DELAYED RELEASE ORAL
Qty: 30 CAPSULE | Refills: 5 | Status: SHIPPED | OUTPATIENT
Start: 2023-11-01

## 2023-11-14 ENCOUNTER — TELEPHONE (OUTPATIENT)
Age: 83
End: 2023-11-14

## 2023-11-16 ENCOUNTER — OFFICE VISIT (OUTPATIENT)
Age: 83
End: 2023-11-16
Payer: MEDICARE

## 2023-11-16 VITALS
SYSTOLIC BLOOD PRESSURE: 140 MMHG | BODY MASS INDEX: 29.35 KG/M2 | TEMPERATURE: 98 F | HEIGHT: 66 IN | WEIGHT: 182.6 LBS | OXYGEN SATURATION: 98 % | DIASTOLIC BLOOD PRESSURE: 76 MMHG | RESPIRATION RATE: 15 BRPM | HEART RATE: 63 BPM

## 2023-11-16 DIAGNOSIS — M17.0 PRIMARY OSTEOARTHRITIS OF BOTH KNEES: ICD-10-CM

## 2023-11-16 DIAGNOSIS — I10 ESSENTIAL (PRIMARY) HYPERTENSION: Primary | ICD-10-CM

## 2023-11-16 DIAGNOSIS — Z91.81 AT HIGH RISK FOR FALLS: ICD-10-CM

## 2023-11-16 PROCEDURE — G8399 PT W/DXA RESULTS DOCUMENT: HCPCS | Performed by: INTERNAL MEDICINE

## 2023-11-16 PROCEDURE — 99214 OFFICE O/P EST MOD 30 MIN: CPT | Performed by: INTERNAL MEDICINE

## 2023-11-16 PROCEDURE — 3077F SYST BP >= 140 MM HG: CPT | Performed by: INTERNAL MEDICINE

## 2023-11-16 PROCEDURE — 1036F TOBACCO NON-USER: CPT | Performed by: INTERNAL MEDICINE

## 2023-11-16 PROCEDURE — 3078F DIAST BP <80 MM HG: CPT | Performed by: INTERNAL MEDICINE

## 2023-11-16 PROCEDURE — 1123F ACP DISCUSS/DSCN MKR DOCD: CPT | Performed by: INTERNAL MEDICINE

## 2023-11-16 PROCEDURE — 1090F PRES/ABSN URINE INCON ASSESS: CPT | Performed by: INTERNAL MEDICINE

## 2023-11-16 PROCEDURE — G8419 CALC BMI OUT NRM PARAM NOF/U: HCPCS | Performed by: INTERNAL MEDICINE

## 2023-11-16 PROCEDURE — G8427 DOCREV CUR MEDS BY ELIG CLIN: HCPCS | Performed by: INTERNAL MEDICINE

## 2023-11-16 PROCEDURE — G8484 FLU IMMUNIZE NO ADMIN: HCPCS | Performed by: INTERNAL MEDICINE

## 2023-11-16 RX ORDER — PYRIDOXINE HCL (VITAMIN B6) 50 MG
TABLET ORAL
COMMUNITY

## 2023-11-16 RX ORDER — TURMERIC 400 MG
CAPSULE ORAL
COMMUNITY

## 2023-11-17 NOTE — PROGRESS NOTES
HPI:  Kathe Anderson is a 78y.o. year old female who is here for a follow-up visit. She is here to have paperwork filled out to move to a senior living community. She is planning her move in the next 2 months. She does not have children and is worried about long-term needs for care. She has persistent issues with left leg pain and some walking issues but otherwise her knees hurt and there are no other orthopedic issues. No chest pains or shortness of breath. No change in bowel or bladder habits. Past Medical History:   Diagnosis Date    Arthritis     DDD BACK PAIN    Chest pain, atypical     Chronic pain     HIP    Diverticulitis of colon     Fibrocystic disease of breast     GERD (gastroesophageal reflux disease)     Hypercholesterolemia     Hypertension     IBS (irritable bowel syndrome)     Menopause 1968    Neuropathy     Ovarian cyst        Past Surgical History:   Procedure Laterality Date    APPENDECTOMY      COLONOSCOPY  10/20/2016    Dr. Madeline Bro - 5 yr f/u    COLONOSCOPY  10/22/2010    5 yr fu, [facility]    DILATION AND CURETTAGE OF UTERUS      HIP ARTHROSCOPY      TONSILLECTOMY         Prior to Admission medications    Medication Sig Start Date End Date Taking?  Authorizing Provider   Turmeric 400 MG CAPS Take by mouth   Yes Neymar Pérez MD   Multiple Vitamins-Minerals (CENTRUM SILVER 50+WOMEN PO) Take by mouth   Yes Neymar Pérez MD   Cyanocobalamin (B-12) 100 MCG TABS Take by mouth   Yes Neymar Pérez MD   bisoprolol-hydroCHLOROthiazide (ZIAC) 10-6.25 MG per tablet TAKE 1 TABLET BY MOUTH EVERY DAY 11/1/23  Yes Lashonda Aldrich MD   DULoxetine (CYMBALTA) 60 MG extended release capsule TAKE 1 CAPSULE BY MOUTH EVERY DAY 11/1/23  Yes Lashonda Aldrich MD   acetaminophen (TYLENOL) 500 MG tablet Take by mouth every 6 hours as needed   Yes Automatic Reconciliation, Ar   cimetidine (TAGAMET) 200 MG tablet Take by mouth as needed   Yes Automatic Reconciliation, Ar   diclofenac sodium

## 2024-01-29 ENCOUNTER — TELEPHONE (OUTPATIENT)
Age: 84
End: 2024-01-29

## 2024-01-29 RX ORDER — DULOXETIN HYDROCHLORIDE 60 MG/1
CAPSULE, DELAYED RELEASE ORAL
Qty: 30 CAPSULE | Refills: 2 | Status: SHIPPED | OUTPATIENT
Start: 2024-01-29

## 2024-01-29 NOTE — TELEPHONE ENCOUNTER
[facility]    [address] [facility] [facility] [address]    Phone:  [phone]       Thank You for choosing us for your health care visit. We are glad to serve you and happy to provide you with this summary of your visit. Please help us to ensure we have accurate records. If you find anything that needs to be changed, please let our staff know as soon as possible.          Your Demographic Information     Patient Name Sex     Jennifer Bird Female 1982       Ethnic Group Patient Race    Not of  or  Origin White      Your Visit Details     Date & Time Provider Department    3/15/2017 1:15 PM Erin Baldwin NP [facility]      Your Upcoming Appointment*(Max 10)     Wednesday March 15, 2017  1:15 PM CDT   Ultrasound Exam with Erin Baldwin NP   [facility] ([address])    [address] [facility] [address] [facility] [address]   [phone]            2017  2:30 PM CDT   Office Visit with Ra Arriola MD   [facility] (Formerly [facility])    [address]  [place] [address]   [phone]              Your To Do List     Future Orders Please Complete On or Around Expires    PREGNANCY TEST QUALITATIVE SERUM  Mar 15, 2017 2017    Standing Orders Interval Expires    FOLLICULAR STUDY DONE IN OB DEPT   2017      We Ordered or Performed the Following     FOLLICULAR STUDY DONE IN OB DEPT       Conditions Discussed Today or Order-Related Diagnoses        Comments    Scanty menstruation    -  Primary       Your Vitals Were     BP LMP Breastfeeding? Smoking Status          138/62 03/15/2017 No Never Smoker        Medications Prescribed or Re-Ordered Today     clomiPHENE (CLOMID) 50 MG tablet    Sig - Route: Take 1 tablet by mouth daily. - Oral    Class: Eprescribe    Pharm[facility]: [facility]  Chief Complaint   Patient presents with    Medication Refill     Last Appointment with Dr. Isaac Rehman:  12/20/2023   Future Appointments   Date Time Provider Department Center   3/25/2024  1:15 PM Isaac Rehman MD [facility] AMB        [address] [facility] [address] [facility] Ph #: [phone]      Your Current Medications Are        Disp Refills Start End    clomiPHENE (CLOMID) 50 MG tablet 5 tablet 0 3/15/2017 5/13/2017    Sig - Route: Take 1 tablet by mouth daily. - Oral    Class: Eprescribe    progesterone (PROMETRIUM) 200 MG capsule 30 capsule 4 2/27/2017 8/27/2017    Sig: Insert into vagina starting 1/28,luteal phase    Class: Eprescribe    Prenatal Vit-Fe Fumarate-FA (MULTIVITAMIN & MINERAL W/FOLIC ACID- PRENATAL) 27-1 MG TABS        Sig - Route: Take 1 tablet by mouth daily. - Oral    Class: Historical Med      Allergies     Penicillins RASH    Tetracycline RASH      Immunizations History as of 3/15/2017     Name Date    INFLUENZA QUADRIVALENT 10/1/2015    Influenza 9/23/2016, 9/23/2014, 10/29/2012    PPD 5/19/2014    Tdap 2/16/2015  5:02 PM, 11/26/2013      Problem List as of 3/15/2017     Malignant melanoma of skin    Allergic rhinitis    Negative History of CA, HTN, DM, CAD, CVA, DVT, Asthma    Migraine    Diverticulitis            Patient Instructions     None

## 2024-01-29 NOTE — TELEPHONE ENCOUNTER
Medication Refill Request    Sabrina Judd is requesting a refill of the following medication(s):   DULoxetine (CYMBALTA) 60 MG extended release capsule                 Please send refill to:     [facility]/pharmacy #[id number] - [place], [place] - [address] -  [phone] - F [phone]  [address] [facility] [address] [facility] [address]  Phone: [phone] Fax: [phone]

## 2024-03-05 ENCOUNTER — OFFICE VISIT (OUTPATIENT)
Age: 84
End: 2024-03-05
Payer: MEDICARE

## 2024-03-05 VITALS
TEMPERATURE: 98.2 F | OXYGEN SATURATION: 97 % | WEIGHT: 179.4 LBS | BODY MASS INDEX: 28.83 KG/M2 | HEART RATE: 66 BPM | SYSTOLIC BLOOD PRESSURE: 127 MMHG | HEIGHT: 66 IN | RESPIRATION RATE: 15 BRPM | DIASTOLIC BLOOD PRESSURE: 62 MMHG

## 2024-03-05 DIAGNOSIS — I10 ESSENTIAL (PRIMARY) HYPERTENSION: ICD-10-CM

## 2024-03-05 DIAGNOSIS — G47.00 INSOMNIA, UNSPECIFIED TYPE: ICD-10-CM

## 2024-03-05 DIAGNOSIS — E78.2 MIXED HYPERLIPIDEMIA: ICD-10-CM

## 2024-03-05 DIAGNOSIS — Z00.00 MEDICARE ANNUAL WELLNESS VISIT, SUBSEQUENT: Primary | ICD-10-CM

## 2024-03-05 DIAGNOSIS — E83.52 HYPERCALCEMIA: ICD-10-CM

## 2024-03-05 DIAGNOSIS — I48.0 PAROXYSMAL ATRIAL FIBRILLATION (HCC): ICD-10-CM

## 2024-03-05 DIAGNOSIS — M17.0 PRIMARY OSTEOARTHRITIS OF BOTH KNEES: ICD-10-CM

## 2024-03-05 PROBLEM — I82.462: Status: RESOLVED | Noted: 2023-09-06 | Resolved: 2024-03-05

## 2024-03-05 LAB
25(OH)D3 SERPL-MCNC: 29.3 NG/ML (ref 30–100)
ALBUMIN SERPL-MCNC: 4 G/DL (ref 3.5–5)
ALBUMIN/GLOB SERPL: 1.5 (ref 1.1–2.2)
ALP SERPL-CCNC: 74 U/L (ref 45–117)
ALT SERPL-CCNC: 17 U/L (ref 12–78)
ANION GAP SERPL CALC-SCNC: 3 MMOL/L (ref 5–15)
AST SERPL-CCNC: 7 U/L (ref 15–37)
BASOPHILS # BLD: 0.1 K/UL (ref 0–0.1)
BASOPHILS NFR BLD: 1 % (ref 0–1)
BILIRUB SERPL-MCNC: 0.5 MG/DL (ref 0.2–1)
BUN SERPL-MCNC: 13 MG/DL (ref 6–20)
BUN/CREAT SERPL: 21 (ref 12–20)
CALCIUM SERPL-MCNC: 10 MG/DL (ref 8.5–10.1)
CHLORIDE SERPL-SCNC: 99 MMOL/L (ref 97–108)
CHOLEST SERPL-MCNC: 201 MG/DL
CO2 SERPL-SCNC: 30 MMOL/L (ref 21–32)
CREAT SERPL-MCNC: 0.61 MG/DL (ref 0.55–1.02)
DIFFERENTIAL METHOD BLD: NORMAL
EOSINOPHIL # BLD: 0.1 K/UL (ref 0–0.4)
EOSINOPHIL NFR BLD: 1 % (ref 0–7)
ERYTHROCYTE [DISTWIDTH] IN BLOOD BY AUTOMATED COUNT: 13.3 % (ref 11.5–14.5)
GLOBULIN SER CALC-MCNC: 2.7 G/DL (ref 2–4)
GLUCOSE SERPL-MCNC: 92 MG/DL (ref 65–100)
HCT VFR BLD AUTO: 38.6 % (ref 35–47)
HDLC SERPL-MCNC: 59 MG/DL
HDLC SERPL: 3.4 (ref 0–5)
HGB BLD-MCNC: 13 G/DL (ref 11.5–16)
IMM GRANULOCYTES # BLD AUTO: 0 K/UL (ref 0–0.04)
IMM GRANULOCYTES NFR BLD AUTO: 0 % (ref 0–0.5)
LDLC SERPL CALC-MCNC: 121.2 MG/DL (ref 0–100)
LYMPHOCYTES # BLD: 1.4 K/UL (ref 0.8–3.5)
LYMPHOCYTES NFR BLD: 23 % (ref 12–49)
MCH RBC QN AUTO: 29.9 PG (ref 26–34)
MCHC RBC AUTO-ENTMCNC: 33.7 G/DL (ref 30–36.5)
MCV RBC AUTO: 88.7 FL (ref 80–99)
MONOCYTES # BLD: 0.6 K/UL (ref 0–1)
MONOCYTES NFR BLD: 10 % (ref 5–13)
NEUTS SEG # BLD: 3.9 K/UL (ref 1.8–8)
NEUTS SEG NFR BLD: 65 % (ref 32–75)
NRBC # BLD: 0 K/UL (ref 0–0.01)
NRBC BLD-RTO: 0 PER 100 WBC
PLATELET # BLD AUTO: 218 K/UL (ref 150–400)
PMV BLD AUTO: 10.8 FL (ref 8.9–12.9)
POTASSIUM SERPL-SCNC: 4.3 MMOL/L (ref 3.5–5.1)
PROT SERPL-MCNC: 6.7 G/DL (ref 6.4–8.2)
RBC # BLD AUTO: 4.35 M/UL (ref 3.8–5.2)
SODIUM SERPL-SCNC: 132 MMOL/L (ref 136–145)
TRIGL SERPL-MCNC: 104 MG/DL
TSH SERPL DL<=0.05 MIU/L-ACNC: 1.6 UIU/ML (ref 0.36–3.74)
VLDLC SERPL CALC-MCNC: 20.8 MG/DL
WBC # BLD AUTO: 6 K/UL (ref 3.6–11)

## 2024-03-05 PROCEDURE — G8419 CALC BMI OUT NRM PARAM NOF/U: HCPCS | Performed by: INTERNAL MEDICINE

## 2024-03-05 PROCEDURE — 1123F ACP DISCUSS/DSCN MKR DOCD: CPT | Performed by: INTERNAL MEDICINE

## 2024-03-05 PROCEDURE — G8484 FLU IMMUNIZE NO ADMIN: HCPCS | Performed by: INTERNAL MEDICINE

## 2024-03-05 PROCEDURE — 1090F PRES/ABSN URINE INCON ASSESS: CPT | Performed by: INTERNAL MEDICINE

## 2024-03-05 PROCEDURE — 1036F TOBACCO NON-USER: CPT | Performed by: INTERNAL MEDICINE

## 2024-03-05 PROCEDURE — 99214 OFFICE O/P EST MOD 30 MIN: CPT | Performed by: INTERNAL MEDICINE

## 2024-03-05 PROCEDURE — G8427 DOCREV CUR MEDS BY ELIG CLIN: HCPCS | Performed by: INTERNAL MEDICINE

## 2024-03-05 PROCEDURE — G8399 PT W/DXA RESULTS DOCUMENT: HCPCS | Performed by: INTERNAL MEDICINE

## 2024-03-05 PROCEDURE — 3078F DIAST BP <80 MM HG: CPT | Performed by: INTERNAL MEDICINE

## 2024-03-05 PROCEDURE — G0439 PPPS, SUBSEQ VISIT: HCPCS | Performed by: INTERNAL MEDICINE

## 2024-03-05 PROCEDURE — 3074F SYST BP LT 130 MM HG: CPT | Performed by: INTERNAL MEDICINE

## 2024-03-05 RX ORDER — DULOXETIN HYDROCHLORIDE 60 MG/1
CAPSULE, DELAYED RELEASE ORAL
Qty: 90 CAPSULE | Refills: 3 | Status: SHIPPED | OUTPATIENT
Start: 2024-03-05

## 2024-03-05 RX ORDER — BISOPROLOL FUMARATE AND HYDROCHLOROTHIAZIDE 10; 6.25 MG/1; MG/1
1 TABLET ORAL DAILY
Qty: 90 TABLET | Refills: 3 | Status: SHIPPED | OUTPATIENT
Start: 2024-03-05

## 2024-03-05 RX ORDER — NABUMETONE 500 MG/1
500 TABLET, FILM COATED ORAL 2 TIMES DAILY
Qty: 180 TABLET | Refills: 3 | Status: SHIPPED | OUTPATIENT
Start: 2024-03-05

## 2024-03-05 RX ORDER — ZOLPIDEM TARTRATE 5 MG/1
TABLET ORAL
Qty: 30 TABLET | Refills: 1 | Status: SHIPPED | OUTPATIENT
Start: 2024-03-05 | End: 2024-04-05

## 2024-03-05 ASSESSMENT — PATIENT HEALTH QUESTIONNAIRE - PHQ9
SUM OF ALL RESPONSES TO PHQ QUESTIONS 1-9: 0
2. FEELING DOWN, DEPRESSED OR HOPELESS: 0
1. LITTLE INTEREST OR PLEASURE IN DOING THINGS: 0
SUM OF ALL RESPONSES TO PHQ9 QUESTIONS 1 & 2: 0
SUM OF ALL RESPONSES TO PHQ QUESTIONS 1-9: 0

## 2024-03-05 ASSESSMENT — LIFESTYLE VARIABLES
HOW MANY STANDARD DRINKS CONTAINING ALCOHOL DO YOU HAVE ON A TYPICAL DAY: PATIENT DOES NOT DRINK
HOW OFTEN DO YOU HAVE A DRINK CONTAINING ALCOHOL: NEVER

## 2024-03-05 NOTE — PROGRESS NOTES
appointments were made and/or referrals ordered.    Positive Risk Factor Screenings with Interventions:    Fall Risk:  Do you feel unsteady or are you worried about falling? : (!) yes  2 or more falls in past year?: no  Fall with injury in past year?: (!) yes     Interventions:    Reviewed medications, home hazards, visual acuity, and co-morbidities that can increase risk for falls  See AVS for additional education material         Controlled Medication Review:      Today's Pain Level: Pain Score: Zero     Opioid Risk: (Low risk score <55) Opioid risk score: 7    Patient is low risk for opioid use disorder or overdose.    Last PDMP Sergey as Reviewed:  Review User Review Instant Review Result                   Activity, Diet, and Weight:  On average, how many days per week do you engage in moderate to strenuous exercise (like a brisk walk)?: 0 days  On average, how many minutes do you engage in exercise at this level?: 0 min    Do you eat balanced/healthy meals regularly?: Yes    Body mass index is 28.96 kg/m².      Inactivity Interventions:  See AVS for additional education material         Hearing Screen:  Do you or your family notice any trouble with your hearing that hasn't been managed with hearing aids?: (!) Yes    Interventions:  See AVS for additional education material    Vision Screen:  Do you have difficulty driving, watching TV, or doing any of your daily activities because of your eyesight?: (!) Yes  Have you had an eye exam within the past year?: Appointment is scheduled  No results found.    Interventions:   See AVS for additional education material                    Objective   Vitals:    03/05/24 1025   BP: 127/62   Pulse: 66   Resp: 15   Temp: 98.2 °F (36.8 °C)   SpO2: 97%   Weight: 81.4 kg (179 lb 6.4 oz)   Height: 1.676 m (5' 6\")      Body mass index is 28.96 kg/m².        General Appearance: alert and oriented to person, place and time, well-developed and well-nourished, in no acute

## 2024-03-05 NOTE — PATIENT INSTRUCTIONS
activities.     Do not smoke. If you need help quitting, talk to your doctor about stop-smoking programs and medicines. These can increase your chances of quitting for good. Quitting smoking may be the most important step you can take to protect your heart. It is never too late to quit.     Limit alcohol to 2 drinks a day for men and 1 drink a day for women. Too much alcohol can cause health problems.     Manage other health problems such as diabetes, high blood pressure, and high cholesterol. If you think you may have a problem with alcohol or drug use, talk to your doctor.   Medicines    Take your medicines exactly as prescribed. Call your doctor if you think you are having a problem with your medicine.     If your doctor recommends aspirin, take the amount directed each day. Make sure you take aspirin and not another kind of pain reliever, such as acetaminophen (Tylenol).   When should you call for help?   Call 911 if you have symptoms of a heart attack. These may include:    Chest pain or pressure, or a strange feeling in the chest.     Sweating.     Shortness of breath.     Pain, pressure, or a strange feeling in the back, neck, jaw, or upper belly or in one or both shoulders or arms.     Lightheadedness or sudden weakness.     A fast or irregular heartbeat.   After you call 911, the  may tell you to chew 1 adult-strength or 2 to 4 low-dose aspirin. Wait for an ambulance. Do not try to drive yourself.  Watch closely for changes in your health, and be sure to contact your doctor if you have any problems.  Where can you learn more?  Go to https://www.[employer].net/patientEd and enter F075 to learn more about \"A Healthy Heart: Care Instructions.\"  Current as of: June 25, 2023               Content Version: 13.9  © 7593-0849 Healthwise, Incorporated.   Care instructions adapted under license by [employer]. If you have questions about a medical condition or this instruction, always ask your healthcare

## 2024-03-06 DIAGNOSIS — E87.1 HYPONATREMIA: Primary | ICD-10-CM

## 2024-03-18 ENCOUNTER — TRANSCRIBE ORDERS (OUTPATIENT)
Facility: HOSPITAL | Age: 84
End: 2024-03-18

## 2024-03-18 DIAGNOSIS — Z12.31 ENCOUNTER FOR SCREENING MAMMOGRAM FOR MALIGNANT NEOPLASM OF BREAST: Primary | ICD-10-CM

## 2024-04-04 DIAGNOSIS — E87.1 HYPONATREMIA: ICD-10-CM

## 2024-04-04 DIAGNOSIS — E83.52 HYPERCALCEMIA: ICD-10-CM

## 2024-04-04 LAB
ANION GAP SERPL CALC-SCNC: 1 MMOL/L (ref 5–15)
APPEARANCE UR: CLEAR
BACTERIA URNS QL MICRO: NEGATIVE /HPF
BILIRUB UR QL: NEGATIVE
BUN SERPL-MCNC: 14 MG/DL (ref 6–20)
BUN/CREAT SERPL: 23 (ref 12–20)
CALCIUM SERPL-MCNC: 10.2 MG/DL (ref 8.5–10.1)
CHLORIDE SERPL-SCNC: 102 MMOL/L (ref 97–108)
CO2 SERPL-SCNC: 30 MMOL/L (ref 21–32)
COLOR UR: ABNORMAL
CREAT SERPL-MCNC: 0.62 MG/DL (ref 0.55–1.02)
EPITH CASTS URNS QL MICRO: ABNORMAL /LPF
GLUCOSE SERPL-MCNC: 105 MG/DL (ref 65–100)
GLUCOSE UR STRIP.AUTO-MCNC: NEGATIVE MG/DL
HGB UR QL STRIP: NEGATIVE
KETONES UR QL STRIP.AUTO: ABNORMAL MG/DL
LEUKOCYTE ESTERASE UR QL STRIP.AUTO: NEGATIVE
MUCOUS THREADS URNS QL MICRO: ABNORMAL /LPF
NITRITE UR QL STRIP.AUTO: NEGATIVE
PH UR STRIP: 6.5 (ref 5–8)
POTASSIUM SERPL-SCNC: 4.4 MMOL/L (ref 3.5–5.1)
PROT UR STRIP-MCNC: NEGATIVE MG/DL
RBC #/AREA URNS HPF: ABNORMAL /HPF (ref 0–5)
SODIUM SERPL-SCNC: 133 MMOL/L (ref 136–145)
SP GR UR REFRACTOMETRY: 1.02 (ref 1–1.03)
SPECIMEN HOLD: NORMAL
UROBILINOGEN UR QL STRIP.AUTO: 0.2 EU/DL (ref 0.2–1)
WBC URNS QL MICRO: ABNORMAL /HPF (ref 0–4)

## 2024-05-14 ENCOUNTER — HOSPITAL ENCOUNTER (OUTPATIENT)
Facility: HOSPITAL | Age: 84
Discharge: HOME | End: 2024-05-17
Attending: INTERNAL MEDICINE
Payer: MEDICARE

## 2024-05-14 VITALS — BODY MASS INDEX: 26.68 KG/M2 | WEIGHT: 170 LBS | HEIGHT: 67 IN

## 2024-05-14 DIAGNOSIS — Z12.31 ENCOUNTER FOR SCREENING MAMMOGRAM FOR MALIGNANT NEOPLASM OF BREAST: ICD-10-CM

## 2024-05-14 PROCEDURE — 77067 SCR MAMMO BI INCL CAD: CPT

## 2024-05-14 PROCEDURE — 77063 BREAST TOMOSYNTHESIS BI: CPT

## 2024-05-29 ENCOUNTER — OFFICE VISIT (OUTPATIENT)
Age: 84
End: 2024-05-29
Payer: MEDICARE

## 2024-05-29 VITALS
RESPIRATION RATE: 16 BRPM | HEART RATE: 65 BPM | TEMPERATURE: 98.2 F | WEIGHT: 182 LBS | OXYGEN SATURATION: 97 % | SYSTOLIC BLOOD PRESSURE: 140 MMHG | BODY MASS INDEX: 29.25 KG/M2 | HEIGHT: 66 IN | DIASTOLIC BLOOD PRESSURE: 63 MMHG

## 2024-05-29 DIAGNOSIS — H25.9 AGE-RELATED CATARACT OF BOTH EYES, UNSPECIFIED AGE-RELATED CATARACT TYPE: Primary | ICD-10-CM

## 2024-05-29 DIAGNOSIS — E78.2 MIXED HYPERLIPIDEMIA: ICD-10-CM

## 2024-05-29 DIAGNOSIS — M17.0 PRIMARY OSTEOARTHRITIS OF BOTH KNEES: ICD-10-CM

## 2024-05-29 DIAGNOSIS — I10 ESSENTIAL HYPERTENSION: ICD-10-CM

## 2024-05-29 DIAGNOSIS — G62.9 POLYNEUROPATHY, UNSPECIFIED: ICD-10-CM

## 2024-05-29 PROCEDURE — G8427 DOCREV CUR MEDS BY ELIG CLIN: HCPCS | Performed by: INTERNAL MEDICINE

## 2024-05-29 PROCEDURE — 99214 OFFICE O/P EST MOD 30 MIN: CPT | Performed by: INTERNAL MEDICINE

## 2024-05-29 PROCEDURE — 1090F PRES/ABSN URINE INCON ASSESS: CPT | Performed by: INTERNAL MEDICINE

## 2024-05-29 PROCEDURE — 1036F TOBACCO NON-USER: CPT | Performed by: INTERNAL MEDICINE

## 2024-05-29 PROCEDURE — 1123F ACP DISCUSS/DSCN MKR DOCD: CPT | Performed by: INTERNAL MEDICINE

## 2024-05-29 PROCEDURE — G8419 CALC BMI OUT NRM PARAM NOF/U: HCPCS | Performed by: INTERNAL MEDICINE

## 2024-05-29 NOTE — PROGRESS NOTES
85 04/04/2024    GFRAA >60 04/27/2022    AGRATIO 1.8 03/24/2023    GLOB 2.7 03/05/2024          IMPRESSION:   None  No contraindications to planned surgery  Sabrina was seen today for pre-op exam.    Diagnoses and all orders for this visit:    Age-related cataract of both eyes, unspecified age-related cataract type-cleared for surgery.    Polyneuropathy, unspecified-stable on Cymbalta.    Essential hypertension-blood pressure well-controlled on current meds.    Mixed hyperlipidemia-diet controlled.    Primary osteoarthritis of both knees-clinically stable and using Relafen and Tylenol as needed.          Isaac Rehman MD   5/29/2024

## 2024-06-24 RX ORDER — METHENAMINE, SODIUM PHOSPHATE, MONOBASIC, MONOHYDRATE, PHENYL SALICYLATE, METHYLENE BLUE, AND HYOSCYAMINE SULFATE 118; 40.8; 36; 10; .12 MG/1; MG/1; MG/1; MG/1; MG/1
CAPSULE ORAL
Qty: 90 CAPSULE | Refills: 2 | Status: SHIPPED | OUTPATIENT
Start: 2024-06-24

## 2024-06-24 NOTE — TELEPHONE ENCOUNTER
Chief Complaint   Patient presents with    Medication Refill     Last Appointment with Dr. Isaac Rehman:  5/29/2024   No future appointments.

## 2024-06-24 NOTE — TELEPHONE ENCOUNTER
Medication Refill Request    Sabrina Judd is requesting a refill of the following medication(s):   URO-MP capsule  Please send refill to:     [facility]/pharmacy #[id number] - [place] - [address] -  [phone] - F [phone] [address] [facility] [address]  Phone: [phone] Fax: [phone]

## 2024-06-25 ENCOUNTER — TELEPHONE (OUTPATIENT)
Age: 84
End: 2024-06-25

## 2024-06-25 NOTE — TELEPHONE ENCOUNTER
Medication Refill Request    Sabrina Judd is requesting a refill of the following medication(s):   URO-MP  Please send refill to:     [facility]/pharmacy #[id number] - [place], [place] - [address] -  [phone] - F [phone] [address] [facility] [address]  Phone: [phone] Fax: [phone]

## 2024-09-09 ENCOUNTER — TELEPHONE (OUTPATIENT)
Age: 84
End: 2024-09-09

## 2024-09-09 DIAGNOSIS — E87.1 HYPONATREMIA: Primary | ICD-10-CM

## 2024-10-09 ENCOUNTER — OFFICE VISIT (OUTPATIENT)
Age: 84
End: 2024-10-09
Payer: MEDICARE

## 2024-10-09 VITALS
HEIGHT: 66 IN | TEMPERATURE: 97 F | BODY MASS INDEX: 29.99 KG/M2 | OXYGEN SATURATION: 98 % | WEIGHT: 186.6 LBS | HEART RATE: 75 BPM | SYSTOLIC BLOOD PRESSURE: 143 MMHG | DIASTOLIC BLOOD PRESSURE: 80 MMHG | RESPIRATION RATE: 16 BRPM

## 2024-10-09 DIAGNOSIS — E87.1 HYPONATREMIA: ICD-10-CM

## 2024-10-09 DIAGNOSIS — Z01.818 PRE-OP EVALUATION: Primary | ICD-10-CM

## 2024-10-09 DIAGNOSIS — Z23 NEED FOR VACCINATION: ICD-10-CM

## 2024-10-09 LAB
ANION GAP SERPL CALC-SCNC: 5 MMOL/L (ref 2–12)
BUN SERPL-MCNC: 16 MG/DL (ref 6–20)
BUN/CREAT SERPL: 23 (ref 12–20)
CALCIUM SERPL-MCNC: 10.3 MG/DL (ref 8.5–10.1)
CHLORIDE SERPL-SCNC: 100 MMOL/L (ref 97–108)
CO2 SERPL-SCNC: 29 MMOL/L (ref 21–32)
CREAT SERPL-MCNC: 0.7 MG/DL (ref 0.55–1.02)
GLUCOSE SERPL-MCNC: 105 MG/DL (ref 65–100)
POTASSIUM SERPL-SCNC: 4.1 MMOL/L (ref 3.5–5.1)
SODIUM SERPL-SCNC: 134 MMOL/L (ref 136–145)

## 2024-10-09 PROCEDURE — 99214 OFFICE O/P EST MOD 30 MIN: CPT | Performed by: NURSE PRACTITIONER

## 2024-10-09 PROCEDURE — 90653 IIV ADJUVANT VACCINE IM: CPT | Performed by: NURSE PRACTITIONER

## 2024-10-09 SDOH — ECONOMIC STABILITY: INCOME INSECURITY: HOW HARD IS IT FOR YOU TO PAY FOR THE VERY BASICS LIKE FOOD, HOUSING, MEDICAL CARE, AND HEATING?: NOT HARD AT ALL

## 2024-10-09 SDOH — ECONOMIC STABILITY: FOOD INSECURITY: WITHIN THE PAST 12 MONTHS, YOU WORRIED THAT YOUR FOOD WOULD RUN OUT BEFORE YOU GOT MONEY TO BUY MORE.: NEVER TRUE

## 2024-10-09 SDOH — ECONOMIC STABILITY: FOOD INSECURITY: WITHIN THE PAST 12 MONTHS, THE FOOD YOU BOUGHT JUST DIDN'T LAST AND YOU DIDN'T HAVE MONEY TO GET MORE.: NEVER TRUE

## 2024-10-09 ASSESSMENT — PATIENT HEALTH QUESTIONNAIRE - PHQ9
SUM OF ALL RESPONSES TO PHQ9 QUESTIONS 1 & 2: 0
1. LITTLE INTEREST OR PLEASURE IN DOING THINGS: NOT AT ALL
SUM OF ALL RESPONSES TO PHQ QUESTIONS 1-9: 0
2. FEELING DOWN, DEPRESSED OR HOPELESS: NOT AT ALL

## 2024-10-09 NOTE — PROGRESS NOTES
Date of Exam: 10/9/2024    Sabrina Judd is a 84 y.o. female (:1940) who presents for preoperative evaluation.   Procedure/Surgery:cataract  Date of Procedure/Surgery: 10/21/24  Surgeon: [facility]urgical Facility: [facility]  Primary Physician: Isaac Rehman MD  Latex Allergy: No    Current Outpatient Medications   Medication Sig Dispense Refill    Meth-Hyo-M Bl-Na Phos-Ph Sal (URIBEL) 118 MG CAPS TAKE 1 CAP BY MOUTH FOUR (4) TIMES DAILY AS NEEDED FOR PAIN. 90 capsule 2    vitamin D 25 MCG (1000 UT) CAPS Take 1 capsule by mouth daily      bisoprolol-hydroCHLOROthiazide (ZIAC) 10-6.25 MG per tablet Take 1 tablet by mouth daily 90 tablet 3    DULoxetine (CYMBALTA) 60 MG extended release capsule TAKE 1 CAPSULE BY MOUTH EVERY DAY 90 capsule 3    nabumetone (RELAFEN) 500 MG tablet Take 1 tablet by mouth 2 times daily 180 tablet 3    Turmeric 400 MG CAPS Take by mouth      Multiple Vitamins-Minerals (CENTRUM SILVER 50+WOMEN PO) Take by mouth      Cyanocobalamin (B-12) 100 MCG TABS Take by mouth      acetaminophen (TYLENOL) 500 MG tablet Take by mouth every 6 hours as needed      cimetidine (TAGAMET) 200 MG tablet Take by mouth as needed      diclofenac sodium (VOLTAREN) 1 % GEL Apply topically 4 times daily      diphenhydrAMINE (BENADRYL) 25 MG capsule Take by mouth      traMADol (ULTRAM) 50 MG tablet Take by mouth every 6 hours as needed.       No current facility-administered medications for this visit.        Past Medical History:   Diagnosis Date    Arthritis     DDD BACK PAIN    Chest pain, atypical     Chronic pain     HIP    Diverticulitis of colon     Fibrocystic disease of breast     GERD (gastroesophageal reflux disease)     Hypercholesterolemia     Hypertension     IBS (irritable bowel syndrome)     Menopause 1968    Neuropathy     Ovarian cyst         Past Surgical History:   Procedure Laterality Date    APPENDECTOMY      COLONOSCOPY  10/20/2016    Dr. Colón - 5 yr f/u

## 2024-10-15 DIAGNOSIS — G47.00 INSOMNIA, UNSPECIFIED TYPE: ICD-10-CM

## 2024-10-15 RX ORDER — ZOLPIDEM TARTRATE 5 MG/1
TABLET ORAL
Qty: 30 TABLET | Refills: 2 | Status: SHIPPED | OUTPATIENT
Start: 2024-10-15 | End: 2025-01-14

## 2024-10-22 ENCOUNTER — TELEPHONE (OUTPATIENT)
Age: 84
End: 2024-10-22

## 2024-10-22 NOTE — TELEPHONE ENCOUNTER
Reason for call:  TC from pt. Pt stated that her BP was elevated to 200  systolic yesterday 10/22 before going back for cataract surgery and today it's at 168. Patient is requesting a call to back to discuss her elevated BP.      Is this a new problem: Yes    Date of last appointment:  10/9/2024     Can we respond via [employer]: No    Best call back number:     Sabrina Judd (Self) [phone] (Home)        aspirin 81 mg oral tablet, chewable: 1 tab(s) chewed once a day as needed for nausea MDD: 1  Effexor 75 mg oral tablet: 1 orally once a day  hyoscyamine 0.125 mg sublingual tablet: 1 tab(s) sublingually every 6 hours  Namenda 10 mg oral tablet: 1 orally once a day  omeprazole 40 mg oral delayed release capsule: 1 cap(s) orally once a day  ondansetron 4 mg oral tablet: 1 tab(s) orally every 6 hours  vitamin D 95547scigw / weekly:

## 2024-10-23 NOTE — TELEPHONE ENCOUNTER
Spoke with pt - she states she had cataract surgery on Monday with Dr Oneal. He was concerned with her BP being elevated. She states she was experiencing headache and a bit of chest tightness at that time. They seemed to think maybe it was anxiety due to surgery. She did have isurgery done. She had fuv yesterday and Dr Oneal wanted her to let Dr Rehman know what had happened. She did state she does feel much better today. Offered her an appt but she declined. 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 She will go have her BP checked today and if she feels she needs to come in she will call and schedule an appt. She will have her next cataract surgery in 2 weeks. Will forward to MD.

## 2025-01-21 ENCOUNTER — TELEPHONE (OUTPATIENT)
Age: 85
End: 2025-01-21

## 2025-01-21 NOTE — TELEPHONE ENCOUNTER
Lvm labs will be ordered after the appt SRJ is not sure of what should be ordered until he sees you.

## 2025-01-21 NOTE — TELEPHONE ENCOUNTER
Reason for call:  TC from pt. Pt id verified by two identifiers. Pt called to make an appt, with Dr. Rehman for Monday, January 21. Pt states she is due for labwork and would like to be able to get labs done at this visit. Pt would like a call once labs have been ordered.     Is this a new problem: Yes    Date of last appointment:  10/9/2024     Can we respond via [employer]: No - pt would like a call, please    Best call back number: [phone]

## 2025-01-27 ENCOUNTER — OFFICE VISIT (OUTPATIENT)
Age: 85
End: 2025-01-27
Payer: MEDICARE

## 2025-01-27 VITALS
WEIGHT: 192 LBS | OXYGEN SATURATION: 95 % | HEART RATE: 78 BPM | HEIGHT: 66 IN | BODY MASS INDEX: 30.86 KG/M2 | SYSTOLIC BLOOD PRESSURE: 137 MMHG | TEMPERATURE: 98 F | DIASTOLIC BLOOD PRESSURE: 82 MMHG | RESPIRATION RATE: 15 BRPM

## 2025-01-27 DIAGNOSIS — M25.511 CHRONIC PAIN OF BOTH SHOULDERS: ICD-10-CM

## 2025-01-27 DIAGNOSIS — I48.0 PAROXYSMAL ATRIAL FIBRILLATION (HCC): ICD-10-CM

## 2025-01-27 DIAGNOSIS — E55.9 VITAMIN D DEFICIENCY, UNSPECIFIED: ICD-10-CM

## 2025-01-27 DIAGNOSIS — I10 ESSENTIAL HYPERTENSION: Primary | ICD-10-CM

## 2025-01-27 DIAGNOSIS — M25.512 CHRONIC PAIN OF BOTH SHOULDERS: ICD-10-CM

## 2025-01-27 DIAGNOSIS — N64.4 BREAST PAIN, RIGHT: ICD-10-CM

## 2025-01-27 DIAGNOSIS — E78.2 MIXED HYPERLIPIDEMIA: ICD-10-CM

## 2025-01-27 DIAGNOSIS — G89.29 CHRONIC PAIN OF BOTH SHOULDERS: ICD-10-CM

## 2025-01-27 PROCEDURE — 1160F RVW MEDS BY RX/DR IN RCRD: CPT | Performed by: INTERNAL MEDICINE

## 2025-01-27 PROCEDURE — G8427 DOCREV CUR MEDS BY ELIG CLIN: HCPCS | Performed by: INTERNAL MEDICINE

## 2025-01-27 PROCEDURE — 99214 OFFICE O/P EST MOD 30 MIN: CPT | Performed by: INTERNAL MEDICINE

## 2025-01-27 PROCEDURE — G8417 CALC BMI ABV UP PARAM F/U: HCPCS | Performed by: INTERNAL MEDICINE

## 2025-01-27 PROCEDURE — 1036F TOBACCO NON-USER: CPT | Performed by: INTERNAL MEDICINE

## 2025-01-27 PROCEDURE — 1123F ACP DISCUSS/DSCN MKR DOCD: CPT | Performed by: INTERNAL MEDICINE

## 2025-01-27 PROCEDURE — 1126F AMNT PAIN NOTED NONE PRSNT: CPT | Performed by: INTERNAL MEDICINE

## 2025-01-27 PROCEDURE — 1090F PRES/ABSN URINE INCON ASSESS: CPT | Performed by: INTERNAL MEDICINE

## 2025-01-27 PROCEDURE — 1159F MED LIST DOCD IN RCRD: CPT | Performed by: INTERNAL MEDICINE

## 2025-01-27 RX ORDER — ZOLPIDEM TARTRATE 5 MG/1
5 TABLET ORAL NIGHTLY PRN
COMMUNITY

## 2025-01-27 SDOH — ECONOMIC STABILITY: FOOD INSECURITY: WITHIN THE PAST 12 MONTHS, YOU WORRIED THAT YOUR FOOD WOULD RUN OUT BEFORE YOU GOT MONEY TO BUY MORE.: NEVER TRUE

## 2025-01-27 SDOH — ECONOMIC STABILITY: FOOD INSECURITY: WITHIN THE PAST 12 MONTHS, THE FOOD YOU BOUGHT JUST DIDN'T LAST AND YOU DIDN'T HAVE MONEY TO GET MORE.: NEVER TRUE

## 2025-01-27 ASSESSMENT — PATIENT HEALTH QUESTIONNAIRE - PHQ9
SUM OF ALL RESPONSES TO PHQ QUESTIONS 1-9: 0
SUM OF ALL RESPONSES TO PHQ9 QUESTIONS 1 & 2: 0
SUM OF ALL RESPONSES TO PHQ QUESTIONS 1-9: 0
2. FEELING DOWN, DEPRESSED OR HOPELESS: NOT AT ALL
1. LITTLE INTEREST OR PLEASURE IN DOING THINGS: NOT AT ALL

## 2025-01-27 NOTE — PROGRESS NOTES
HPI:  Sabrina Judd is a 84 y.o. year old female who is here for a follow-up visit.  She had cataract surgery back in the fall and apparently her blood pressures were quite elevated at the time she has not been checking her blood pressures consistently since then.  She did have some headaches then.  She does have some occasional ringing in the ears.  She denies vertigo.  No nausea or vomiting.  No change in bowel or bladder habits.  Some pain across the right breast intermittently.  She notes it to be lasting several seconds but multiple times per day.  She did have a mammogram with dense breast from back in the fall but she is concerned about breast issues.      Past Medical History:   Diagnosis Date    Arthritis     DDD BACK PAIN    Chest pain, atypical     Chronic pain     HIP    Diverticulitis of colon     Fibrocystic disease of breast     GERD (gastroesophageal reflux disease)     Hypercholesterolemia     Hypertension     IBS (irritable bowel syndrome)     Menopause 1968    Neuropathy     Ovarian cyst        Past Surgical History:   Procedure Laterality Date    APPENDECTOMY      CATARACT REMOVAL Bilateral     COLONOSCOPY  10/20/2016    Dr. Colón - 5 yr f/u    COLONOSCOPY  10/22/2010    5 yr Eldon hadley    DILATION AND CURETTAGE OF UTERUS      HIP ARTHROSCOPY      TONSILLECTOMY         Prior to Admission medications    Medication Sig Start Date End Date Taking? Authorizing Provider   zolpidem (AMBIEN) 5 MG tablet Take 1 tablet by mouth nightly as needed for Sleep. Half to full pill       Max Daily Amount: 5 mg   Yes Neymar Pérez MD   Naproxen Sodium (ALEVE PO) Take by mouth   Yes Neymar Pérez MD   vitamin D 25 MCG (1000 UT) CAPS Take 1 capsule by mouth daily   Yes Neymar Pérez MD   bisoprolol-hydroCHLOROthiazide (ZIAC) 10-6.25 MG per tablet Take 1 tablet by mouth daily 3/5/24  Yes Isaac Rehman MD   DULoxetine (CYMBALTA) 60 MG extended release capsule TAKE 1 CAPSULE BY MOUTH EVERY

## 2025-01-30 LAB
25(OH)D3+25(OH)D2 SERPL-MCNC: 53 NG/ML (ref 30–100)
ALBUMIN SERPL-MCNC: 4.4 G/DL (ref 3.7–4.7)
ALP SERPL-CCNC: 86 IU/L (ref 44–121)
ALT SERPL-CCNC: 14 IU/L (ref 0–32)
AST SERPL-CCNC: 18 IU/L (ref 0–40)
BASOPHILS # BLD AUTO: 0.1 X10E3/UL (ref 0–0.2)
BASOPHILS NFR BLD AUTO: 1 %
BILIRUB SERPL-MCNC: 0.3 MG/DL (ref 0–1.2)
BUN SERPL-MCNC: 11 MG/DL (ref 8–27)
BUN/CREAT SERPL: 18 (ref 12–28)
CALCIUM SERPL-MCNC: 10 MG/DL (ref 8.7–10.3)
CHLORIDE SERPL-SCNC: 100 MMOL/L (ref 96–106)
CHOLEST SERPL-MCNC: 234 MG/DL (ref 100–199)
CO2 SERPL-SCNC: 23 MMOL/L (ref 20–29)
CREAT SERPL-MCNC: 0.62 MG/DL (ref 0.57–1)
EGFRCR SERPLBLD CKD-EPI 2021: 87 ML/MIN/1.73
EOSINOPHIL # BLD AUTO: 0.1 X10E3/UL (ref 0–0.4)
EOSINOPHIL NFR BLD AUTO: 1 %
ERYTHROCYTE [DISTWIDTH] IN BLOOD BY AUTOMATED COUNT: 12.5 % (ref 11.7–15.4)
GLOBULIN SER CALC-MCNC: 2.1 G/DL (ref 1.5–4.5)
GLUCOSE SERPL-MCNC: 114 MG/DL (ref 70–99)
HCT VFR BLD AUTO: 39.7 % (ref 34–46.6)
HDLC SERPL-MCNC: 54 MG/DL
HGB BLD-MCNC: 13.3 G/DL (ref 11.1–15.9)
IMM GRANULOCYTES # BLD AUTO: 0 X10E3/UL (ref 0–0.1)
IMM GRANULOCYTES NFR BLD AUTO: 1 %
LDLC SERPL CALC-MCNC: 161 MG/DL (ref 0–99)
LYMPHOCYTES # BLD AUTO: 1.8 X10E3/UL (ref 0.7–3.1)
LYMPHOCYTES NFR BLD AUTO: 30 %
MCH RBC QN AUTO: 29.8 PG (ref 26.6–33)
MCHC RBC AUTO-ENTMCNC: 33.5 G/DL (ref 31.5–35.7)
MCV RBC AUTO: 89 FL (ref 79–97)
MONOCYTES # BLD AUTO: 0.4 X10E3/UL (ref 0.1–0.9)
MONOCYTES NFR BLD AUTO: 6 %
NEUTROPHILS # BLD AUTO: 3.8 X10E3/UL (ref 1.4–7)
NEUTROPHILS NFR BLD AUTO: 61 %
PLATELET # BLD AUTO: 244 X10E3/UL (ref 150–450)
POTASSIUM SERPL-SCNC: 4.4 MMOL/L (ref 3.5–5.2)
PROT SERPL-MCNC: 6.5 G/DL (ref 6–8.5)
RBC # BLD AUTO: 4.47 X10E6/UL (ref 3.77–5.28)
SODIUM SERPL-SCNC: 138 MMOL/L (ref 134–144)
TRIGL SERPL-MCNC: 105 MG/DL (ref 0–149)
TSH SERPL DL<=0.005 MIU/L-ACNC: 2.82 UIU/ML (ref 0.45–4.5)
VLDLC SERPL CALC-MCNC: 19 MG/DL (ref 5–40)
WBC # BLD AUTO: 6.1 X10E3/UL (ref 3.4–10.8)

## 2025-02-09 RX ORDER — BISOPROLOL FUMARATE AND HYDROCHLOROTHIAZIDE 10; 6.25 MG/1; MG/1
1 TABLET ORAL DAILY
Qty: 90 TABLET | Refills: 3 | Status: SHIPPED | OUTPATIENT
Start: 2025-02-09

## 2025-02-09 RX ORDER — DULOXETIN HYDROCHLORIDE 60 MG/1
CAPSULE, DELAYED RELEASE ORAL
Qty: 90 CAPSULE | Refills: 3 | Status: SHIPPED | OUTPATIENT
Start: 2025-02-09

## 2025-04-04 ENCOUNTER — HOSPITAL ENCOUNTER (OUTPATIENT)
Facility: HOSPITAL | Age: 85
Discharge: HOME | End: 2025-04-04
Attending: INTERNAL MEDICINE
Payer: MEDICARE

## 2025-04-04 ENCOUNTER — HOSPITAL ENCOUNTER (OUTPATIENT)
Facility: HOSPITAL | Age: 85
End: 2025-04-04
Attending: INTERNAL MEDICINE
Payer: MEDICARE

## 2025-04-04 DIAGNOSIS — N64.4 BREAST PAIN, RIGHT: ICD-10-CM

## 2025-04-04 PROCEDURE — G0279 TOMOSYNTHESIS, MAMMO: HCPCS

## 2025-06-04 ENCOUNTER — OFFICE VISIT (OUTPATIENT)
Age: 85
End: 2025-06-04
Payer: MEDICARE

## 2025-06-04 VITALS
OXYGEN SATURATION: 98 % | RESPIRATION RATE: 16 BRPM | HEIGHT: 66 IN | WEIGHT: 191 LBS | SYSTOLIC BLOOD PRESSURE: 138 MMHG | BODY MASS INDEX: 30.7 KG/M2 | DIASTOLIC BLOOD PRESSURE: 85 MMHG | HEART RATE: 64 BPM

## 2025-06-04 DIAGNOSIS — M16.12 PRIMARY LOCALIZED OSTEOARTHRITIS OF LEFT HIP: ICD-10-CM

## 2025-06-04 DIAGNOSIS — Z00.00 MEDICARE ANNUAL WELLNESS VISIT, SUBSEQUENT: Primary | ICD-10-CM

## 2025-06-04 DIAGNOSIS — E78.2 MIXED HYPERLIPIDEMIA: ICD-10-CM

## 2025-06-04 DIAGNOSIS — E55.9 VITAMIN D DEFICIENCY, UNSPECIFIED: ICD-10-CM

## 2025-06-04 DIAGNOSIS — M19.011 ARTHRITIS OF BOTH SHOULDERS: ICD-10-CM

## 2025-06-04 DIAGNOSIS — M17.10 ARTHRITIS OF KNEE: ICD-10-CM

## 2025-06-04 DIAGNOSIS — I10 ESSENTIAL HYPERTENSION: ICD-10-CM

## 2025-06-04 DIAGNOSIS — R23.2 ABNORMAL FLUSHING AND SWEATING: ICD-10-CM

## 2025-06-04 DIAGNOSIS — I48.0 PAROXYSMAL ATRIAL FIBRILLATION (HCC): ICD-10-CM

## 2025-06-04 DIAGNOSIS — M19.012 ARTHRITIS OF BOTH SHOULDERS: ICD-10-CM

## 2025-06-04 DIAGNOSIS — R61 ABNORMAL FLUSHING AND SWEATING: ICD-10-CM

## 2025-06-04 PROCEDURE — 1036F TOBACCO NON-USER: CPT | Performed by: INTERNAL MEDICINE

## 2025-06-04 PROCEDURE — 1159F MED LIST DOCD IN RCRD: CPT | Performed by: INTERNAL MEDICINE

## 2025-06-04 PROCEDURE — 99214 OFFICE O/P EST MOD 30 MIN: CPT | Performed by: INTERNAL MEDICINE

## 2025-06-04 PROCEDURE — 1090F PRES/ABSN URINE INCON ASSESS: CPT | Performed by: INTERNAL MEDICINE

## 2025-06-04 PROCEDURE — G8417 CALC BMI ABV UP PARAM F/U: HCPCS | Performed by: INTERNAL MEDICINE

## 2025-06-04 PROCEDURE — 1123F ACP DISCUSS/DSCN MKR DOCD: CPT | Performed by: INTERNAL MEDICINE

## 2025-06-04 PROCEDURE — G8427 DOCREV CUR MEDS BY ELIG CLIN: HCPCS | Performed by: INTERNAL MEDICINE

## 2025-06-04 PROCEDURE — G0439 PPPS, SUBSEQ VISIT: HCPCS | Performed by: INTERNAL MEDICINE

## 2025-06-04 PROCEDURE — 1125F AMNT PAIN NOTED PAIN PRSNT: CPT | Performed by: INTERNAL MEDICINE

## 2025-06-04 RX ORDER — DULOXETIN HYDROCHLORIDE 30 MG/1
30 CAPSULE, DELAYED RELEASE ORAL DAILY
Qty: 30 CAPSULE | Refills: 3 | Status: SHIPPED | OUTPATIENT
Start: 2025-06-04

## 2025-06-04 NOTE — PROGRESS NOTES
extended release capsule TAKE 1 CAPSULE BY MOUTH EVERY DAY Yes Isaac Rehman MD   zolpidem (AMBIEN) 5 MG tablet Take 1 tablet by mouth nightly as needed for Sleep. Half to full pill Yes Neymar Pérez MD   Naproxen Sodium (ALEVE PO) Take by mouth Yes Neymar Pérez MD   vitamin D 25 MCG (1000 UT) CAPS Take 1 capsule by mouth daily Yes Neymar Pérez MD   nabumetone (RELAFEN) 500 MG tablet Take 1 tablet by mouth 2 times daily Yes Isaac Rehman MD   Multiple Vitamins-Minerals (CENTRUM SILVER 50+WOMEN PO) Take by mouth Yes Neymar Pérez MD   Cyanocobalamin (B-12) 100 MCG TABS Take by mouth Yes Neymar Pérez MD       CareTeam (Including outside providers/suppliers regularly involved in providing care):   Patient Care Team:  Isaac Rehman MD as PCP - General  Isaac Rehman MD as PCP - Empaneled Provider     Recommendations for Preventive Services Due: see orders and patient instructions/AVS.  Recommended screening schedule for the next 5-10 years is provided to the patient in written form: see Patient Instructions/AVS.     Reviewed and updated this visit:  Allergies  Meds  Med Hx  Sexual Hx

## 2025-06-23 RX ORDER — NABUMETONE 500 MG/1
500 TABLET, FILM COATED ORAL 2 TIMES DAILY
Qty: 180 TABLET | Refills: 0 | Status: SHIPPED | OUTPATIENT
Start: 2025-06-23

## 2025-06-23 NOTE — TELEPHONE ENCOUNTER
Pt last seen on 6/4/2025. Due to return in one year.    Visit date not found.    Rx last filled on 3/5/24 #180/3RF.    Will forward to provider on call for refill.

## 2025-07-02 RX ORDER — DULOXETIN HYDROCHLORIDE 30 MG/1
CAPSULE, DELAYED RELEASE ORAL DAILY
Qty: 90 CAPSULE | Refills: 2 | Status: SHIPPED | OUTPATIENT
Start: 2025-07-02

## (undated) DEVICE — T4 HOOD

## (undated) DEVICE — STRAP,POSITIONING,KNEE/BODY,FOAM,4X60": Brand: MEDLINE

## (undated) DEVICE — STERILE POLYISOPRENE POWDER-FREE SURGICAL GLOVES: Brand: PROTEXIS

## (undated) DEVICE — DRESSING HYDROCOLLOID BORDER 35X10 IN ALUM PRIMASEAL

## (undated) DEVICE — BLADE ELECTRODE: Brand: EDGE

## (undated) DEVICE — 3M™ IOBAN™ 2 ANTIMICROBIAL INCISE DRAPE 6651EZ: Brand: IOBAN™ 2

## (undated) DEVICE — SCRUB DRY SURG EZ SCRUB BRUSH PREOPERATIVE GRN

## (undated) DEVICE — DECANTER BAG 9": Brand: MEDLINE INDUSTRIES, INC.

## (undated) DEVICE — STERILE POLYISOPRENE POWDER-FREE SURGICAL GLOVES WITH EMOLLIENT COATING: Brand: PROTEXIS

## (undated) DEVICE — 4-PORT MANIFOLD: Brand: NEPTUNE 2

## (undated) DEVICE — CONTAINER,SPECIMEN,3OZ,OR STRL: Brand: MEDLINE

## (undated) DEVICE — COVER,MAYO STAND,STERILE: Brand: MEDLINE

## (undated) DEVICE — SUTURE VCRL SZ 2 L54IN ABSRB UD L65MM TP-1 1/2 CIR J880T

## (undated) DEVICE — SOL IRR STRL H2O 1000ML BTL --

## (undated) DEVICE — TOTAL TRAY, 16FR 10ML SIL FOLEY, URN: Brand: MEDLINE

## (undated) DEVICE — BLADE SAW W073XL276IN THK0031IN CUT THK0036IN REPL SAG

## (undated) DEVICE — REM POLYHESIVE ADULT PATIENT RETURN ELECTRODE: Brand: VALLEYLAB

## (undated) DEVICE — TOWEL SURG W17XL27IN STD BLU COT NONFENESTRATED PREWASHED

## (undated) DEVICE — Device

## (undated) DEVICE — SUTURE STRATAFIX SYMMETRIC PDS + SZ 1 L18IN ABSRB VLT L48MM SXPP1A400

## (undated) DEVICE — INFECTION CONTROL KIT SYS

## (undated) DEVICE — HANDPIECE SET WITH BONE CLEANING TIP AND SUCTION TUBE: Brand: INTERPULSE

## (undated) DEVICE — DRAPE,REIN 53X77,STERILE: Brand: MEDLINE

## (undated) DEVICE — PAD BD MATTRESS 73X32 IN STD CONVOLUTED FOAM LTX FREE

## (undated) DEVICE — GARMENT,MEDLINE,DVT,INT,CALF,MED, GEN2: Brand: MEDLINE

## (undated) DEVICE — SYR 20ML LL STRL LF --

## (undated) DEVICE — PREP SKN CHLRAPRP APL 26ML STR --

## (undated) DEVICE — PATIENT PROTECTIVE PAD FOR IMP UNIVERSAL LATERAL HIP POSITIONER (ULP) (6/CASE): Brand: PATIENT PROTECTIVE PAD

## (undated) DEVICE — NEEDLE HYPO 22GA L1.5IN BLK S STL HUB POLYPR SHLD REG BVL

## (undated) DEVICE — GAUZE,SPONGE,2"X2",8PLY,STERILE,LF,2'S: Brand: MEDLINE

## (undated) DEVICE — SOL IRR SOD CL 0.9% 3000ML --

## (undated) DEVICE — SUTURE VCRL SZ 2-0 L36IN ABSRB UD L40MM CT 1/2 CIR J957H

## (undated) DEVICE — SUTURE VCRL SZ 1 L27IN ABSRB VLT L36MM CT-1 1/2 CIR J341H